# Patient Record
Sex: FEMALE | NOT HISPANIC OR LATINO | Employment: OTHER | URBAN - NONMETROPOLITAN AREA
[De-identification: names, ages, dates, MRNs, and addresses within clinical notes are randomized per-mention and may not be internally consistent; named-entity substitution may affect disease eponyms.]

---

## 2021-01-01 ENCOUNTER — TRANSCRIBE ORDERS (OUTPATIENT)
Dept: PODIATRY | Facility: CLINIC | Age: 81
End: 2021-01-01

## 2021-01-01 DIAGNOSIS — M79.672 BILATERAL FOOT PAIN: Primary | ICD-10-CM

## 2021-01-01 DIAGNOSIS — M79.671 BILATERAL FOOT PAIN: Primary | ICD-10-CM

## 2021-02-08 DIAGNOSIS — I15.9 SECONDARY HYPERTENSION: ICD-10-CM

## 2021-02-08 RX ORDER — AMMONIUM LACTATE 12 G/100G
LOTION TOPICAL PRN
COMMUNITY

## 2021-02-08 RX ORDER — ALLOPURINOL 100 MG/1
100 TABLET ORAL 2 TIMES DAILY
COMMUNITY

## 2021-02-08 RX ORDER — ASPIRIN 81 MG/1
81 TABLET ORAL DAILY
Status: ON HOLD | COMMUNITY
End: 2022-03-25 | Stop reason: HOSPADM

## 2021-02-08 RX ORDER — ATORVASTATIN CALCIUM 20 MG/1
20 TABLET, FILM COATED ORAL DAILY
COMMUNITY

## 2021-02-08 RX ORDER — LOSARTAN POTASSIUM 25 MG/1
25 TABLET ORAL DAILY
Status: ON HOLD | COMMUNITY
End: 2022-01-02 | Stop reason: HOSPADM

## 2021-02-08 RX ORDER — AMLODIPINE BESYLATE 5 MG/1
5 TABLET ORAL DAILY
Status: ON HOLD | COMMUNITY
End: 2022-03-25 | Stop reason: HOSPADM

## 2021-02-08 RX ORDER — LEVOTHYROXINE SODIUM 0.1 MG/1
100 TABLET ORAL DAILY
Status: ON HOLD | COMMUNITY
End: 2022-03-25 | Stop reason: SDUPTHER

## 2021-02-08 RX ORDER — FUROSEMIDE 80 MG
80 TABLET ORAL DAILY
Status: ON HOLD | COMMUNITY
End: 2021-11-23 | Stop reason: HOSPADM

## 2021-02-08 RX ORDER — NITROGLYCERIN 2 %
0.5 OINTMENT (GRAM) TRANSDERMAL PRN
COMMUNITY

## 2021-02-08 RX ORDER — MAGNESIUM OXIDE 400 MG/1
400 TABLET ORAL 2 TIMES DAILY
COMMUNITY

## 2021-02-08 RX ORDER — OXYBUTYNIN CHLORIDE 5 MG/1
5 TABLET, EXTENDED RELEASE ORAL DAILY
Status: ON HOLD | COMMUNITY
End: 2021-11-17

## 2021-03-03 ENCOUNTER — TELEPHONE (OUTPATIENT)
Dept: CARDIOLOGY CLINIC | Age: 81
End: 2021-03-03

## 2021-03-03 NOTE — TELEPHONE ENCOUNTER
Spoke with Joaquín Owens, the Daughter, to see if the Pt had any other history. Pt stated that saw a cardiologist in [place], and she would find the name of the doctor or the clinic she was seen in and call the office back.

## 2021-03-08 ENCOUNTER — OFFICE VISIT (OUTPATIENT)
Dept: CARDIOLOGY CLINIC | Age: 81
End: 2021-03-08
Payer: MEDICARE

## 2021-03-08 VITALS
BODY MASS INDEX: 27.23 KG/M2 | HEIGHT: 62 IN | OXYGEN SATURATION: 99 % | DIASTOLIC BLOOD PRESSURE: 78 MMHG | SYSTOLIC BLOOD PRESSURE: 124 MMHG | HEART RATE: 59 BPM | WEIGHT: 148 LBS

## 2021-03-08 DIAGNOSIS — I25.10 CORONARY ARTERY DISEASE INVOLVING NATIVE CORONARY ARTERY OF NATIVE HEART WITHOUT ANGINA PECTORIS: Primary | ICD-10-CM

## 2021-03-08 DIAGNOSIS — E78.5 HYPERLIPIDEMIA, UNSPECIFIED HYPERLIPIDEMIA TYPE: ICD-10-CM

## 2021-03-08 DIAGNOSIS — I44.7 LEFT BUNDLE BRANCH BLOCK: ICD-10-CM

## 2021-03-08 DIAGNOSIS — I10 BENIGN HYPERTENSION: ICD-10-CM

## 2021-03-08 PROCEDURE — 93000 ELECTROCARDIOGRAM COMPLETE: CPT | Performed by: INTERNAL MEDICINE

## 2021-03-08 PROCEDURE — 1123F ACP DISCUSS/DSCN MKR DOCD: CPT | Performed by: INTERNAL MEDICINE

## 2021-03-08 PROCEDURE — 1036F TOBACCO NON-USER: CPT | Performed by: INTERNAL MEDICINE

## 2021-03-08 PROCEDURE — 1090F PRES/ABSN URINE INCON ASSESS: CPT | Performed by: INTERNAL MEDICINE

## 2021-03-08 PROCEDURE — 99204 OFFICE O/P NEW MOD 45 MIN: CPT | Performed by: INTERNAL MEDICINE

## 2021-03-08 PROCEDURE — G8417 CALC BMI ABV UP PARAM F/U: HCPCS | Performed by: INTERNAL MEDICINE

## 2021-03-08 PROCEDURE — 4040F PNEUMOC VAC/ADMIN/RCVD: CPT | Performed by: INTERNAL MEDICINE

## 2021-03-08 PROCEDURE — G8484 FLU IMMUNIZE NO ADMIN: HCPCS | Performed by: INTERNAL MEDICINE

## 2021-03-08 PROCEDURE — G8400 PT W/DXA NO RESULTS DOC: HCPCS | Performed by: INTERNAL MEDICINE

## 2021-03-08 PROCEDURE — G8427 DOCREV CUR MEDS BY ELIG CLIN: HCPCS | Performed by: INTERNAL MEDICINE

## 2021-03-08 RX ORDER — TOLTERODINE TARTRATE 2 MG/1
2 TABLET, EXTENDED RELEASE ORAL NIGHTLY
COMMUNITY

## 2021-03-08 ASSESSMENT — ENCOUNTER SYMPTOMS
ABDOMINAL PAIN: 0
BLOOD IN STOOL: 0
NAUSEA: 0
COUGH: 1
VOMITING: 0
SHORTNESS OF BREATH: 1

## 2021-03-08 NOTE — PROGRESS NOTES
Office Visit  Maia Mcginnis is a 79 y.o. female; who present today for New Patient (No Cardiac Sx)      HPI  I am seeing this 79-year-old white female in office evaluation today for the first time to establish with a new cardiologist since she relocated from [place]. I have some of her records from [place] and her history includes coronary artery disease with prior bypass graft surgery along with chronic left bundle branch block. She is living with her daughter who was present and provided most of the information. The patient is able to answer questions appropriately but she seems somewhat slow in answering. She uses a walker to ambulate therefore limited but has not been experiencing any chest discomfort. She gets some exertional dyspnea that has not changed. She gets occasional palpitations that are short-lived, no presyncope or syncope. Her daughter reports that she had an echocardiogram at another facility recently and they were not given the official report, however, she reports that the echo technologist told them that she was in atrial fibrillation but this patient's records from [place] do not indicate any history of atrial fibrillation. She is in sinus rhythm today. We have requested the echocardiogram report. Current Outpatient Medications   Medication Sig Dispense Refill    tolterodine (DETROL) 2 MG tablet Take 2 mg by mouth nightly      allopurinol (ZYLOPRIM) 100 MG tablet Take 100 mg by mouth 2 times daily      amLODIPine (NORVASC) 10 MG tablet Take 5 mg by mouth daily       ammonium lactate (LAC-HYDRIN) 12 % lotion Apply topically as needed for Dry Skin Apply topically as needed.       aspirin 81 MG EC tablet Take 81 mg by mouth daily      atorvastatin (LIPITOR) 20 MG tablet Take 20 mg by mouth daily      Cyanocobalamin 1000 MCG LOZG Take 1,000 mcg by mouth daily      furosemide (LASIX) 80 MG tablet Take 80 mg by mouth daily      levothyroxine (SYNTHROID) 100 MCG tablet Take 100 mcg by mouth Daily      losartan (COZAAR) 25 MG tablet Take 25 mg by mouth daily      magnesium oxide (MAG-OX) 400 MG tablet Take 400 mg by mouth 2 times daily      nitroglycerin (NITRO-BID) 2 % ointment Place 0.5 inches onto the skin as needed       vitamin D 25 MCG (1000 UT) CAPS Take 1,000 Units by mouth daily       oxybutynin (DITROPAN-XL) 5 MG extended release tablet Take 5 mg by mouth daily       No current facility-administered medications for this visit. There are no discontinued medications. No Known Allergies    Past Medical History:   Diagnosis Date    Arthritis     Bladder infection     CAD (coronary artery disease)     Cardiovascular disease     Chronic kidney disease     Colon polyps     Constipation     Gallbladder disease     GERD (gastroesophageal reflux disease)     Gout     Heart attack ([facility] [address]     Heart disease     Hyperlipidemia     Hypertension     Incontinence     Thyroid disease      Negative - Past Medical History for  No past medical history pertinent negatives. Past Surgical History:   Procedure Laterality Date    CHOLECYSTECTOMY      CORONARY ARTERY BYPASS GRAFT      heart bypass    HYSTERECTOMY       Social History     Occupational History    Not on file   Tobacco Use    Smoking status: Never Smoker    Smokeless tobacco: Never Used   Substance and Sexual Activity    Alcohol use: Not Currently    Drug use: Not Currently    Sexual activity: Not Currently        Family History   Problem Relation Age of Onset    Heart Attack Father     Cancer Mother        Review of Systems  Review of Systems   Constitutional: Negative for fever. Respiratory: Positive for cough and shortness of breath. Gastrointestinal: Negative for abdominal pain, blood in stool, nausea and vomiting. Genitourinary: Negative for dysuria and hematuria. Musculoskeletal: Positive for arthralgias. Skin: Negative for rash.    Neurological: Negative for facial asymmetry, weakness and headaches. All other systems reviewed and are negative. Physical Exam  /78   Pulse 59   Ht 5' 2\" (1.575 m)   Wt 148 lb (67.1 kg)   SpO2 99%   BMI 27.07 kg/m²    Physical Exam  Constitutional:       Appearance: Normal appearance. She is normal weight. HENT:      Head: Normocephalic. Mouth/Throat:      Mouth: Mucous membranes are moist.      Tongue: Tongue does not deviate from midline. Eyes:      Conjunctiva/sclera: Conjunctivae normal.      Pupils: Pupils are equal, round, and reactive to light. Cardiovascular:      Rate and Rhythm: Normal rate and regular rhythm. Pulses: No decreased pulses. Carotid pulses are 2+ on the right side and 2+ on the left side. Radial pulses are 2+ on the right side and 2+ on the left side. Femoral pulses are 2+ on the right side and 2+ on the left side. Dorsalis pedis pulses are 2+ on the right side and 2+ on the left side. Posterior tibial pulses are 2+ on the right side and 2+ on the left side. Heart sounds: S1 normal. Murmur present. Systolic (Early, left sternal border) murmur present with a grade of 2/6. No friction rub. No gallop. Comments: Paradoxical split S2  Pulmonary:      Effort: Pulmonary effort is normal.      Breath sounds: Normal breath sounds. Abdominal:      General: Bowel sounds are normal. There is no abdominal bruit. Palpations: Abdomen is soft. There is no mass. Tenderness: There is no abdominal tenderness. Musculoskeletal:         General: No swelling. Right lower leg: No edema. Left lower leg: No edema. Skin:     General: Skin is warm and dry. Neurological:      Mental Status: She is alert and oriented to person, place, and time.    Psychiatric:         Behavior: Behavior normal.         Assessment/Plan    EKG Findings:  Sinus bradycardia, 57 bpm, left bundle branch block    Problem List Items Addressed This Visit        Cardiology Problems    Coronary artery disease involving native coronary artery of native heart without angina pectoris - Primary    Left bundle branch block    Benign hypertension    Hyperlipidemia           Diagnosis Orders   1. Coronary artery disease involving native coronary artery of native heart without angina pectoris      CABG x4, 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 2. Left bundle branch block      Chronic   3. Benign hypertension     4. Hyperlipidemia, unspecified hyperlipidemia type         Recommendations:   Diet: Low-sodium, low-fat diet  Activity: Moderate activities, fall precautions, patient uses a walker  Medication Changes: Continue same medications    We have requested the echocardiogram report but at this point it does not sound very certain that there was atrial fibrillation during the echocardiogram.    No orders of the defined types were placed in this encounter. Orders Placed This Encounter   Procedures    EKG 12 lead     Order Specific Question:   Reason for Exam?     Answer: Other       Return in about 6 months (around 9/8/2021).

## 2021-09-27 ENCOUNTER — OFFICE VISIT (OUTPATIENT)
Dept: CARDIOLOGY CLINIC | Age: 81
End: 2021-09-27
Payer: MEDICARE

## 2021-09-27 VITALS
OXYGEN SATURATION: 96 % | HEART RATE: 66 BPM | SYSTOLIC BLOOD PRESSURE: 124 MMHG | BODY MASS INDEX: 28.3 KG/M2 | HEIGHT: 62 IN | WEIGHT: 153.8 LBS | DIASTOLIC BLOOD PRESSURE: 82 MMHG

## 2021-09-27 DIAGNOSIS — I10 ESSENTIAL HYPERTENSION: Primary | ICD-10-CM

## 2021-09-27 DIAGNOSIS — I25.10 CORONARY ARTERY DISEASE INVOLVING NATIVE CORONARY ARTERY OF NATIVE HEART WITHOUT ANGINA PECTORIS: ICD-10-CM

## 2021-09-27 DIAGNOSIS — E78.5 HYPERLIPIDEMIA, UNSPECIFIED HYPERLIPIDEMIA TYPE: ICD-10-CM

## 2021-09-27 PROCEDURE — 99214 OFFICE O/P EST MOD 30 MIN: CPT | Performed by: NURSE PRACTITIONER

## 2021-09-27 PROCEDURE — 1123F ACP DISCUSS/DSCN MKR DOCD: CPT | Performed by: NURSE PRACTITIONER

## 2021-09-27 PROCEDURE — G8417 CALC BMI ABV UP PARAM F/U: HCPCS | Performed by: NURSE PRACTITIONER

## 2021-09-27 PROCEDURE — G8427 DOCREV CUR MEDS BY ELIG CLIN: HCPCS | Performed by: NURSE PRACTITIONER

## 2021-09-27 PROCEDURE — 4040F PNEUMOC VAC/ADMIN/RCVD: CPT | Performed by: NURSE PRACTITIONER

## 2021-09-27 PROCEDURE — G8400 PT W/DXA NO RESULTS DOC: HCPCS | Performed by: NURSE PRACTITIONER

## 2021-09-27 PROCEDURE — 1090F PRES/ABSN URINE INCON ASSESS: CPT | Performed by: NURSE PRACTITIONER

## 2021-09-27 PROCEDURE — 1036F TOBACCO NON-USER: CPT | Performed by: NURSE PRACTITIONER

## 2021-09-27 ASSESSMENT — ENCOUNTER SYMPTOMS
SHORTNESS OF BREATH: 0
WHEEZING: 0
COUGH: 0
CHEST TIGHTNESS: 0
SORE THROAT: 0

## 2021-09-27 NOTE — PROGRESS NOTES
[facility] Cardiology   Established Patient Office Visit   [facility]. [address]  [phone]        OFFICE VISIT:  2021    Leandro Gary - : 1940    Reason For Visit:  Freya Rosenbaum is a 79 y.o. female who is here for Follow-up (Pt is here with no complaints)    1. Essential hypertension    2. Hyperlipidemia, unspecified hyperlipidemia type    3. Coronary artery disease involving native coronary artery of native heart without angina pectoris        Patient with a history of coronary artery disease/CABG, hypertension, and hyperlipidemia. She is a patient of Dr Becky Gray       Patient presents to clinic today for 6-month follow-up. Patient denies any complaints of chest pain, pressure or tightness. There is no shortness of breath, orthopnea or PND. Patient denies any lightheadedness, dizziness or syncope. Subjective    Leandro Gary is a 79 y.o. female with the following history as recorded in [facility]:    Patient Active Problem List    Diagnosis Date Noted    Coronary artery disease involving native coronary artery of native heart without angina pectoris 2021    Left bundle branch block 2021    Benign hypertension 2021    Hyperlipidemia 2021    Secondary hypertension 2021     Current Outpatient Medications   Medication Sig Dispense Refill    tolterodine (DETROL) 2 MG tablet Take 2 mg by mouth nightly      allopurinol (ZYLOPRIM) 100 MG tablet Take 100 mg by mouth 2 times daily      amLODIPine (NORVASC) 10 MG tablet Take 5 mg by mouth daily       ammonium lactate (LAC-HYDRIN) 12 % lotion Apply topically as needed for Dry Skin Apply topically as needed.       aspirin 81 MG EC tablet Take 81 mg by mouth daily      atorvastatin (LIPITOR) 20 MG tablet Take 20 mg by mouth daily      Cyanocobalamin 1000 MCG LOZG Take 1,000 mcg by mouth daily      furosemide (LASIX) 80 MG tablet Take 80 mg by mouth daily      levothyroxine (SYNTHROID) 100 5' 2\" (1.575 m)   Wt 153 lb 12.8 oz (69.8 kg)   SpO2 96%   BMI 28.13 kg/m²     GENERAL - well developed and well nourished, conversant  HEENT   PERRLA, Hearing appears normal, gentleman lids are normal.  External inspection of ears and nose appear normal.  NECK - no thyromegaly, no JVD, trachea is in the midline  CARDIOVASCULAR  PMI is in the mid line clavicular position, Normal S1 and S2 with no systolic murmur. No S3 or S4    PULMONARY  no respiratory distress. No wheezes or rales. Lungs are clear to ausculation, normal respiratory effort. ABDOMEN   soft, non tender, no rebound  MUSCULOSKELETAL   range of motion of the upper and lower extermites appears normal and equal and is without pain   EXTREMITIES - no significant edema   NEUROLOGIC  gait and station are normal  SKIN - turgor is normal, can warm and dry. PSYCHIATRIC - normal mood and affect, alert and orientated x 3,      ASSESSMENT:    ALL THE CARDIOLOGY PROBLEMS ARE LISTED ABOVE; HOWEVER, THE FOLLOWING SPECIFIC CARDIAC PROBLEMS / CONDITIONS WERE ADDRESSED AND TREATED DURING THE OFFICE VISIT TODAY:                                                                                            MEDICAL DECISION MAKING             Cardiac Specific Problem / Diagnosis  Discussion and Data Reviewed Diagnostic Procedures Ordered Management Options Selected           1. CAD  Stable   Review and summation of old records:    No chest pain. Patient is on aspirin, Lipitor. No Continue current medications:     Yes           2. Hypertension  Well Controlled   Pressure in the office today 124/82. O2 sat 96%. Patient is on amlodipine 5 mg daily. Losartan 25 mg daily. No Continue current medications:    Yes           3. Hyperlipidemia Stable  patient is on Lipitor 20 mg daily. No Continue current medications: Yes                     No orders of the defined types were placed in this encounter.     No orders of the defined types were placed in this encounter. Discussed with patient and daughter. Return in about 6 months (around 3/27/2022) for Dr Valentín Dubose. I greatly appreciate the opportunity to meet Marcia Andrade and your confidence in allowing me to participate in her cardiovascular care. GIAN Aldridge NP  9/27/2021 3:09 PM CDT                    This dictation was generated by voice recognition computer software. Although all attempts are made to edit dictation for accuracy, there may be errors in the transcription that are not intended.

## 2021-11-17 ENCOUNTER — APPOINTMENT (OUTPATIENT)
Dept: GENERAL RADIOLOGY | Age: 81
DRG: 072 | End: 2021-11-17
Payer: MEDICARE

## 2021-11-17 ENCOUNTER — HOSPITAL ENCOUNTER (INPATIENT)
Age: 81
LOS: 6 days | Discharge: NURSING, REHAB OR LONG-TERM CARE | DRG: 072 | End: 2021-11-23
Attending: EMERGENCY MEDICINE | Admitting: INTERNAL MEDICINE
Payer: MEDICARE

## 2021-11-17 DIAGNOSIS — R78.81 POSITIVE BLOOD CULTURE: Primary | ICD-10-CM

## 2021-11-17 DIAGNOSIS — N28.9 RENAL INSUFFICIENCY: ICD-10-CM

## 2021-11-17 DIAGNOSIS — F05 DELIRIUM SUPERIMPOSED ON DEMENTIA: ICD-10-CM

## 2021-11-17 PROBLEM — R41.82 ALTERED MENTAL STATUS: Status: ACTIVE | Noted: 2021-11-17

## 2021-11-17 LAB
ALBUMIN SERPL-MCNC: 4.7 G/DL (ref 3.5–5.2)
ALP BLD-CCNC: 102 U/L (ref 35–104)
ALT SERPL-CCNC: 14 U/L (ref 5–33)
AMMONIA: 12 UMOL/L (ref 11–51)
ANION GAP SERPL CALCULATED.3IONS-SCNC: 14 MMOL/L (ref 7–19)
AST SERPL-CCNC: 27 U/L (ref 5–32)
BASE EXCESS VENOUS: 0 MMOL/L
BASOPHILS ABSOLUTE: 0 K/UL (ref 0–0.2)
BASOPHILS RELATIVE PERCENT: 0.5 % (ref 0–1)
BILIRUB SERPL-MCNC: 0.5 MG/DL (ref 0.2–1.2)
BILIRUBIN URINE: NEGATIVE
BLOOD, URINE: NEGATIVE
BUN BLDV-MCNC: 29 MG/DL (ref 8–23)
CALCIUM SERPL-MCNC: 11 MG/DL (ref 8.8–10.2)
CARBOXYHEMOGLOBIN: 6.3 %
CHLORIDE BLD-SCNC: 101 MMOL/L (ref 98–111)
CLARITY: CLEAR
CO2: 21 MMOL/L (ref 22–29)
COLOR: YELLOW
CREAT SERPL-MCNC: 1.3 MG/DL (ref 0.5–0.9)
EOSINOPHILS ABSOLUTE: 0.1 K/UL (ref 0–0.6)
EOSINOPHILS RELATIVE PERCENT: 1 % (ref 0–5)
GFR AFRICAN AMERICAN: 47
GFR NON-AFRICAN AMERICAN: 39
GLUCOSE BLD-MCNC: 114 MG/DL (ref 74–109)
GLUCOSE URINE: NEGATIVE MG/DL
HCO3 VENOUS: 24 MMOL/L (ref 23–29)
HCT VFR BLD CALC: 46.7 % (ref 37–47)
HEMOGLOBIN: 14 G/DL (ref 12–16)
IMMATURE GRANULOCYTES #: 0 K/UL
KETONES, URINE: NEGATIVE MG/DL
LACTIC ACID: 1.8 MMOL/L (ref 0.5–1.9)
LEUKOCYTE ESTERASE, URINE: NEGATIVE
LYMPHOCYTES ABSOLUTE: 1.5 K/UL (ref 1.1–4.5)
LYMPHOCYTES RELATIVE PERCENT: 18 % (ref 20–40)
MCH RBC QN AUTO: 26.9 PG (ref 27–31)
MCHC RBC AUTO-ENTMCNC: 30 G/DL (ref 33–37)
MCV RBC AUTO: 89.8 FL (ref 81–99)
MONOCYTES ABSOLUTE: 0.4 K/UL (ref 0–0.9)
MONOCYTES RELATIVE PERCENT: 5.4 % (ref 0–10)
NEUTROPHILS ABSOLUTE: 6.1 K/UL (ref 1.5–7.5)
NEUTROPHILS RELATIVE PERCENT: 74.9 % (ref 50–65)
NITRITE, URINE: NEGATIVE
O2 CONTENT, VEN: 15 ML/DL
O2 SAT, VEN: 75 %
PCO2, VEN: 38 MMHG (ref 40–50)
PDW BLD-RTO: 15.4 % (ref 11.5–14.5)
PH UA: 7 (ref 5–8)
PH VENOUS: 7.41 (ref 7.35–7.45)
PLATELET # BLD: 207 K/UL (ref 130–400)
PMV BLD AUTO: 10.7 FL (ref 9.4–12.3)
PO2, VEN: 33 MMHG
POTASSIUM REFLEX MAGNESIUM: 5.2 MMOL/L (ref 3.5–5)
PROTEIN UA: NEGATIVE MG/DL
RBC # BLD: 5.2 M/UL (ref 4.2–5.4)
SARS-COV-2, NAAT: NOT DETECTED
SODIUM BLD-SCNC: 136 MMOL/L (ref 136–145)
SPECIFIC GRAVITY UA: 1.01 (ref 1–1.03)
TOTAL PROTEIN: 7.7 G/DL (ref 6.6–8.7)
TSH REFLEX FT4: 2.63 UIU/ML (ref 0.35–5.5)
TSH REFLEX FT4: 3.19 UIU/ML (ref 0.35–5.5)
UROBILINOGEN, URINE: 0.2 E.U./DL
WBC # BLD: 8.1 K/UL (ref 4.8–10.8)

## 2021-11-17 PROCEDURE — 83605 ASSAY OF LACTIC ACID: CPT

## 2021-11-17 PROCEDURE — 85025 COMPLETE CBC W/AUTO DIFF WBC: CPT

## 2021-11-17 PROCEDURE — 82800 BLOOD PH: CPT

## 2021-11-17 PROCEDURE — 71046 X-RAY EXAM CHEST 2 VIEWS: CPT

## 2021-11-17 PROCEDURE — 2500000003 HC RX 250 WO HCPCS: Performed by: NURSE PRACTITIONER

## 2021-11-17 PROCEDURE — 6360000002 HC RX W HCPCS: Performed by: EMERGENCY MEDICINE

## 2021-11-17 PROCEDURE — 99282 EMERGENCY DEPT VISIT SF MDM: CPT

## 2021-11-17 PROCEDURE — 80053 COMPREHEN METABOLIC PANEL: CPT

## 2021-11-17 PROCEDURE — 36415 COLL VENOUS BLD VENIPUNCTURE: CPT

## 2021-11-17 PROCEDURE — 87086 URINE CULTURE/COLONY COUNT: CPT

## 2021-11-17 PROCEDURE — 93005 ELECTROCARDIOGRAM TRACING: CPT | Performed by: EMERGENCY MEDICINE

## 2021-11-17 PROCEDURE — 82803 BLOOD GASES ANY COMBINATION: CPT

## 2021-11-17 PROCEDURE — 84443 ASSAY THYROID STIM HORMONE: CPT

## 2021-11-17 PROCEDURE — 1210000000 HC MED SURG R&B

## 2021-11-17 PROCEDURE — 87040 BLOOD CULTURE FOR BACTERIA: CPT

## 2021-11-17 PROCEDURE — 81003 URINALYSIS AUTO W/O SCOPE: CPT

## 2021-11-17 PROCEDURE — 82140 ASSAY OF AMMONIA: CPT

## 2021-11-17 PROCEDURE — 87635 SARS-COV-2 COVID-19 AMP PRB: CPT

## 2021-11-17 PROCEDURE — 2580000003 HC RX 258: Performed by: EMERGENCY MEDICINE

## 2021-11-17 PROCEDURE — 6360000002 HC RX W HCPCS: Performed by: NURSE PRACTITIONER

## 2021-11-17 PROCEDURE — 2580000003 HC RX 258: Performed by: NURSE PRACTITIONER

## 2021-11-17 RX ORDER — SODIUM CHLORIDE 0.9 % (FLUSH) 0.9 %
5-40 SYRINGE (ML) INJECTION PRN
Status: DISCONTINUED | OUTPATIENT
Start: 2021-11-17 | End: 2021-11-24 | Stop reason: HOSPADM

## 2021-11-17 RX ORDER — SODIUM CHLORIDE 9 MG/ML
25 INJECTION, SOLUTION INTRAVENOUS PRN
Status: DISCONTINUED | OUTPATIENT
Start: 2021-11-17 | End: 2021-11-18

## 2021-11-17 RX ORDER — ATORVASTATIN CALCIUM 20 MG/1
20 TABLET, FILM COATED ORAL DAILY
Status: DISCONTINUED | OUTPATIENT
Start: 2021-11-17 | End: 2021-11-24 | Stop reason: HOSPADM

## 2021-11-17 RX ORDER — ONDANSETRON 4 MG/1
4 TABLET, ORALLY DISINTEGRATING ORAL EVERY 8 HOURS PRN
Status: DISCONTINUED | OUTPATIENT
Start: 2021-11-17 | End: 2021-11-24 | Stop reason: HOSPADM

## 2021-11-17 RX ORDER — POLYETHYLENE GLYCOL 3350 17 G/17G
17 POWDER, FOR SOLUTION ORAL DAILY PRN
Status: DISCONTINUED | OUTPATIENT
Start: 2021-11-17 | End: 2021-11-24 | Stop reason: HOSPADM

## 2021-11-17 RX ORDER — DULOXETIN HYDROCHLORIDE 30 MG/1
30 CAPSULE, DELAYED RELEASE ORAL DAILY
COMMUNITY
End: 2021-12-31

## 2021-11-17 RX ORDER — 0.9 % SODIUM CHLORIDE 0.9 %
1000 INTRAVENOUS SOLUTION INTRAVENOUS ONCE
Status: COMPLETED | OUTPATIENT
Start: 2021-11-17 | End: 2021-11-17

## 2021-11-17 RX ORDER — ONDANSETRON 2 MG/ML
4 INJECTION INTRAMUSCULAR; INTRAVENOUS EVERY 6 HOURS PRN
Status: DISCONTINUED | OUTPATIENT
Start: 2021-11-17 | End: 2021-11-24 | Stop reason: HOSPADM

## 2021-11-17 RX ORDER — SODIUM CHLORIDE 0.9 % (FLUSH) 0.9 %
5-40 SYRINGE (ML) INJECTION EVERY 12 HOURS SCHEDULED
Status: DISCONTINUED | OUTPATIENT
Start: 2021-11-17 | End: 2021-11-24 | Stop reason: HOSPADM

## 2021-11-17 RX ORDER — LOSARTAN POTASSIUM 50 MG/1
25 TABLET ORAL DAILY
Status: DISCONTINUED | OUTPATIENT
Start: 2021-11-17 | End: 2021-11-24 | Stop reason: HOSPADM

## 2021-11-17 RX ORDER — ASPIRIN 81 MG/1
81 TABLET ORAL DAILY
Status: DISCONTINUED | OUTPATIENT
Start: 2021-11-17 | End: 2021-11-24 | Stop reason: HOSPADM

## 2021-11-17 RX ORDER — ACETAMINOPHEN 650 MG/1
650 SUPPOSITORY RECTAL EVERY 6 HOURS PRN
Status: DISCONTINUED | OUTPATIENT
Start: 2021-11-17 | End: 2021-11-24 | Stop reason: HOSPADM

## 2021-11-17 RX ORDER — AMLODIPINE BESYLATE 5 MG/1
5 TABLET ORAL DAILY
Status: DISCONTINUED | OUTPATIENT
Start: 2021-11-17 | End: 2021-11-24 | Stop reason: HOSPADM

## 2021-11-17 RX ORDER — LEVOTHYROXINE SODIUM 0.05 MG/1
100 TABLET ORAL DAILY
Status: DISCONTINUED | OUTPATIENT
Start: 2021-11-17 | End: 2021-11-24 | Stop reason: HOSPADM

## 2021-11-17 RX ORDER — ACETAMINOPHEN 325 MG/1
650 TABLET ORAL EVERY 6 HOURS PRN
Status: DISCONTINUED | OUTPATIENT
Start: 2021-11-17 | End: 2021-11-24 | Stop reason: HOSPADM

## 2021-11-17 RX ADMIN — SODIUM CHLORIDE, PRESERVATIVE FREE 10 ML: 5 INJECTION INTRAVENOUS at 21:42

## 2021-11-17 RX ADMIN — PIPERACILLIN AND TAZOBACTAM 3375 MG: 3; .375 INJECTION, POWDER, LYOPHILIZED, FOR SOLUTION INTRAVENOUS at 14:41

## 2021-11-17 RX ADMIN — ENOXAPARIN SODIUM 30 MG: 100 INJECTION SUBCUTANEOUS at 16:45

## 2021-11-17 RX ADMIN — FAMOTIDINE 20 MG: 10 INJECTION, SOLUTION INTRAVENOUS at 16:45

## 2021-11-17 RX ADMIN — SODIUM CHLORIDE 1000 ML: 9 INJECTION, SOLUTION INTRAVENOUS at 14:42

## 2021-11-17 ASSESSMENT — ENCOUNTER SYMPTOMS
ALLERGIC/IMMUNOLOGIC NEGATIVE: 1
RESPIRATORY NEGATIVE: 1
EYES NEGATIVE: 1
GASTROINTESTINAL NEGATIVE: 1

## 2021-11-17 NOTE — ED PROVIDER NOTES
[facility] 3 SHAAN/VAS/MED  EMERGENCY DEPARTMENT ENCOUNTER      Pt Name: Celestine Gastelum  MRN: [id number]  Tacosgflaura 1940  Date of evaluation: 11/17/2021  Provider: Aftab Ramon MD    CHIEF COMPLAINT       Chief Complaint   Patient presents with    Altered Mental Status     confusion and hallucinations per daughter, started yesterday. Seen At [address] yesterday without dx         HISTORY OF PRESENT ILLNESS   (Location/Symptom, Timing/Onset,Context/Setting, Quality, Duration, Modifying Factors, Severity)  Note limiting factors. Celestine Gastelum is a 79 y.o. female who presents to the emergency department with complaint of confusion since yesterday according to her daughter. Daughter states patient has had hallucinations and that she seems to be seeing things that are not there and has been very confused. States patient has had difficulty even drinking from a cup because she has had a tremor and does not seem to have the coordination to get the cup to her mouth to drink from. Patient has not had any change in ambulation or obvious change in balance. Patient denies any specific complaints. She has not had any fever, vomiting, diarrhea, or other specific symptoms. Daughter states they went to [facility] yesterday where work-up including blood work, urinalysis, chest x-ray and CT scans of at least the head were performed and mostly came back unremarkable except for a known thyroid goiter. Patient has not had any significant changes this morning. Daughter states that [facility] called her and told her that patient had a positive blood culture there but that only one blood culture had been obtained rather than two and they could not tell her anything more than that but advised her to be reevaluated. Daughter states patient has suspected dementia but that they have not been formally diagnosed yet.   States they have a follow-up scheduled with neurology upcoming for dementia evaluation. HPI    NursingNotes were reviewed. REVIEW OF SYSTEMS    (2-9 systems for level 4, 10 or more for level 5)     Review of Systems   Unable to perform ROS: Mental status change   Psychiatric/Behavioral: Positive for confusion and hallucinations. A complete review of systems was performed and is negative except as noted above in the HPI. PAST MEDICAL HISTORY     Past Medical History:   Diagnosis Date    Arthritis     Bladder infection     CAD (coronary artery disease)     Cardiovascular disease     Chronic kidney disease     Colon polyps     Constipation     Gallbladder disease     GERD (gastroesophageal reflux disease)     Gout     Heart attack ([facility] [address]     Heart disease     Hyperlipidemia     Hypertension     Incontinence     Thyroid disease          SURGICAL HISTORY       Past Surgical History:   Procedure Laterality Date    CHOLECYSTECTOMY      CORONARY ARTERY BYPASS GRAFT      heart bypass    HYSTERECTOMY           CURRENT MEDICATIONS       Current Discharge Medication List      CONTINUE these medications which have NOT CHANGED    Details   DULoxetine (CYMBALTA) 30 MG extended release capsule Take 30 mg by mouth daily      tolterodine (DETROL) 2 MG tablet Take 2 mg by mouth nightly      allopurinol (ZYLOPRIM) 100 MG tablet Take 100 mg by mouth 2 times daily      amLODIPine (NORVASC) 5 MG tablet Take 5 mg by mouth daily       ammonium lactate (LAC-HYDRIN) 12 % lotion Apply topically as needed for Dry Skin Apply topically as needed.       aspirin 81 MG EC tablet Take 81 mg by mouth daily      atorvastatin (LIPITOR) 20 MG tablet Take 20 mg by mouth daily      Cyanocobalamin 1000 MCG LOZG Take 1,000 mcg by mouth daily      furosemide (LASIX) 80 MG tablet Take 80 mg by mouth daily      levothyroxine (SYNTHROID) 100 MCG tablet Take 100 mcg by mouth Daily      losartan (COZAAR) 25 MG tablet Take 25 mg by mouth daily      magnesium oxide (MAG-OX) 400 MG tablet Take 400 mg by mouth 2 times daily       nitroglycerin (NITRO-BID) 2 % ointment Place 0.5 inches onto the skin as needed       vitamin D 25 MCG (1000 UT) CAPS Take 1,000 Units by mouth daily              ALLERGIES     Patient has no known allergies. FAMILY HISTORY       Family History   Problem Relation Age of Onset    Heart Attack Father     Cancer Mother           SOCIAL HISTORY       Social History     Socioeconomic History    Marital status:      Spouse name: None    Number of children: None    Years of education: None    Highest education level: None   Occupational History    None   Tobacco Use    Smoking status: Never Smoker    Smokeless tobacco: Never Used   Vaping Use    Vaping Use: Never used   Substance and Sexual Activity    Alcohol use: Not Currently    Drug use: Not Currently    Sexual activity: Not Currently   Other Topics Concern    None   Social History Narrative    None     Social Determinants of Health     Financial Resource Strain:     Difficulty of Paying Living Expenses: Not on file   Food Insecurity:     Worried About Running Out of Food in the Last Year: Not on file    Анна of Food in the Last Year: Not on file   Transportation Needs:     Lack of Transportation (Medical): Not on file    Lack of Transportation (Non-Medical):  Not on file   Physical Activity:     Days of Exercise per Week: Not on file    Minutes of Exercise per Session: Not on file   Stress:     Feeling of Stress : Not on file   Social Connections:     Frequency of Communication with Friends and Family: Not on file    Frequency of Social Gatherings with Friends and Family: Not on file    Attends Catholic Services: Not on file    Active Member of Clubs or Organizations: Not on file    Attends Club or Organization Meetings: Not on file    Marital Status: Not on file   Intimate Partner Violence:     Fear of Current or Ex-Partner: Not on file    Emotionally Abused: Not on file    Physically Abused: Not on file    Sexually Abused: Not on file   Housing Stability:     Unable to Pay for Housing in the Last Year: Not on file    Number of Places Lived in the Last Year: Not on file    Unstable Housing in the Last Year: Not on file       SCREENINGS    Ulices Coma Scale  Eye Opening: Spontaneous  Best Verbal Response: Confused  Best Motor Response: Obeys commands  Orla Coma Scale Score: 14        PHYSICAL EXAM    (up to 7 for level 4, 8 or more for level 5)     ED Triage Vitals [11/17/21 1118]   BP Temp Temp Source Pulse Resp SpO2 Height Weight   (!) 164/79 97.7 °F (36.5 °C) Oral 64 14 96 % 5' 2\" (1.575 m) 150 lb (68 kg)       Physical Exam  Vitals and nursing note reviewed. Constitutional:       General: She is not in acute distress. Appearance: She is well-developed. She is not toxic-appearing or diaphoretic. HENT:      Head: Normocephalic and atraumatic. Eyes:      General: No scleral icterus. Right eye: No discharge. Left eye: No discharge. Pupils: Pupils are equal, round, and reactive to light. Cardiovascular:      Rate and Rhythm: Normal rate and regular rhythm. Pulmonary:      Effort: Pulmonary effort is normal. No respiratory distress. Breath sounds: No stridor. Abdominal:      General: There is no distension. Musculoskeletal:         General: No deformity. Normal range of motion. Cervical back: Normal range of motion. Skin:     General: Skin is warm and dry. Neurological:      Mental Status: She is alert. GCS: GCS eye subscore is 4. GCS motor subscore is 6. Cranial Nerves: No cranial nerve deficit. Sensory: Sensation is intact. Motor: Motor function is intact. No abnormal muscle tone. Psychiatric:         Behavior: Behavior normal.         Thought Content:  Thought content normal.         Judgment: Judgment normal.         DIAGNOSTIC RESULTS     EKG: All EKG's are interpreted by the Emergency Department Physician who either signs or Co-signs this chart in the absence of a cardiologist.        RADIOLOGY:   Non-plain film images such as CT, Ultrasound and MRI are read by the radiologist. Plainradiographic images are visualized and preliminarily interpreted by the emergency physician with the below findings:        Interpretation per the Radiologist below, if available at the time of this note:    XR CHEST (2 VW)   Final Result   1. No consolidation/acute infiltrate identified. Prior coronary   bypass. Signed by Dr Napoleon Patterson            ED BEDSIDE ULTRASOUND:   Performed by ED Physician - none    LABS:  Labs Reviewed   CBC WITH AUTO DIFFERENTIAL - Abnormal; Notable for the following components:       Result Value    MCH 26.9 (*)     MCHC 30.0 (*)     RDW 15.4 (*)     Neutrophils % 74.9 (*)     Lymphocytes % 18.0 (*)     All other components within normal limits   COMPREHENSIVE METABOLIC PANEL W/ REFLEX TO MG FOR LOW K - Abnormal; Notable for the following components:    Potassium reflex Magnesium 5.2 (*)     CO2 21 (*)     Glucose 114 (*)     BUN 29 (*)     CREATININE 1.3 (*)     GFR Non- 39 (*)     GFR  47 (*)     Calcium 11.0 (*)     All other components within normal limits   VENOUS BLD GAS - Abnormal; Notable for the following components:    pCO2, Go 38.0 (*)     All other components within normal limits   COVID-19, RAPID   CULTURE, BLOOD 1   CULTURE, BLOOD 2   CULTURE, URINE   CULTURE, BLOOD 1   TSH WITH REFLEX TO FT4   URINE RT REFLEX TO CULTURE   LACTIC ACID, PLASMA   AMMONIA   TSH WITH REFLEX TO FT4       All other labs were within normal range or not returned as of this dictation.     EMERGENCY DEPARTMENT COURSE and DIFFERENTIALDIAGNOSIS/MDM:   Vitals:    Vitals:    11/17/21 1118   BP: (!) 164/79   Pulse: 64   Resp: 14   Temp: 97.7 °F (36.5 °C)   TempSrc: Oral   SpO2: 96%   Weight: 150 lb (68 kg)   Height: 5' 2\" (1.575 m)       MDM  ED Course as of 11/17/21 1742   Wed Nov 17, 2021   1336 Records from [facility] obtained. Laboratory evaluation shows unremarkable CBC and CMP. Cardiac work-up showed normal troponin, myoglobin, and CK-MB. Chest x-ray showed possible pneumonia and possible mass in the right hilum. A chest CT was subsequently performed which shows mass in the right and left side of the thyroid with a normal appearance of the lungs on both sides [JENNIFER]   0363 Patient reevaluated this time and still no change in clinical condition. Still very confused beyond baseline with no focal or lateralizing neurologic deficits. Laboratory evaluation here thus far shows no signs of an underlying infection or metabolic derangement as a cause of patient's change in mental status. [JENNIFER]   0692 Chemistry today shows similar findings to labs obtained last night. Creatinine on last night's labs was 1.4 and is 1.3 today. GFR was 38 and is 39 today. There is mild decrease in CO2 at 21 today compared to 26 yesterday. [JENNIFER]   7663 Unclear whether these lab findings represent an acute or chronic renal insufficiency as we have no prior labs available for comparison other than those from yesterday. [JENNIFER]      ED Course User Index  [JENNIFER] Helio Andrews MD     Based on the evaluation and work-up here patient is felt to require further monitoring, work-up, or treatment that is available in the emergency department. Case was discussed with hospitalist who agrees for observation or admission for further management. Treatment and stabilization as necessary were provided in the emergency department prior to transfer of care to the medicine service. CONSULTS:  IP CONSULT TO CASE MANAGEMENT    PROCEDURES:  Unless otherwise notedbelow, none     Procedures    FINAL IMPRESSION     1. Positive blood culture    2. Delirium superimposed on dementia    3.  Renal insufficiency          DISPOSITION/PLAN   DISPOSITION Admitted 11/17/2021 02:21:43 PM      PATIENT REFERRED TO:  No follow-up provider specified.     DISCHARGE MEDICATIONS:  Current Discharge Medication List             (Please note that portions of this note were completed with a voice recognition program.  Efforts were made to edit the dictations butoccasionally words are mis-transcribed.)    Yohana Briceño MD (electronically signed)  Emergency Physician          Yohana Layne MD  11/17/21 1743       Yohana Layne MD  12/08/21 6478

## 2021-11-17 NOTE — PROGRESS NOTES
4 Eyes Skin Assessment    Nanci Barbosa is being assessed upon: Admission    I agree that I, Samira Elizondo RN, along with Lakeisha Hay (either 2 RN's or 1 LPN and 1 RN) have performed a thorough Head to Toe Skin Assessment on the patient. ALL assessment sites listed below have been assessed. Areas assessed by both nurses:     [x]   Head, Face, and Ears   [x]   Shoulders, Back, and Chest  [x]   Arms, Elbows, and Hands   [x]   Coccyx, Sacrum, and Ischium  [x]   Legs, Feet, and Heels    Does the Patient have Skin Breakdown?  No    Zev Prevention initiated: Yes  Wound Care Orders initiated: No    WOC nurse consulted for Pressure Injury (Stage 3,4, Unstageable, DTI, NWPT, and Complex wounds) and New or Established Ostomies: NA        Primary Nurse eSignature: Samira Elizondo RN on 11/17/2021 at 4:32 PM      Co-Signer eSignature: Electronically signed by Lakeisha Hay RN on 11/17/21 at 4:35 PM CST

## 2021-11-17 NOTE — PROGRESS NOTES
Godwin Cleveland arrived to room # 329. Presented with: AMS, positive blood cultures  Mental Status: Patient is disoriented and alert. Vitals:    11/17/21 1118   BP: (!) 164/79   Pulse: 64   Resp: 14   Temp: 97.7 °F (36.5 °C)   SpO2: 96%     Patient safety contract and falls prevention contract reviewed with patient Yes. Oriented Patient and Family to room. Call light within reach. Yes.   Needs, issues or concerns expressed at this time: no.      Electronically signed by Miguelina Sherman RN on 11/17/2021 at 4:31 PM

## 2021-11-17 NOTE — PLAN OF CARE
behavior  Description: Absence of abusive behavior  Outcome: Ongoing  Goal: Verbalizations of feeling emotionally comfortable while being cared for will increase  Description: Verbalizations of feeling emotionally comfortable while being cared for will increase  Outcome: Ongoing     Problem: Psychomotor Activity - Altered:  Goal: Absence of psychomotor disturbance signs and symptoms  Description: Absence of psychomotor disturbance signs and symptoms  Outcome: Ongoing     Problem: Sensory Perception - Impaired:  Goal: Demonstrations of improved sensory functioning will increase  Description: Demonstrations of improved sensory functioning will increase  Outcome: Ongoing  Goal: Decrease in sensory misperception frequency  Description: Decrease in sensory misperception frequency  Outcome: Ongoing  Goal: Able to refrain from responding to false sensory perceptions  Description: Able to refrain from responding to false sensory perceptions  Outcome: Ongoing  Goal: Demonstrates accurate environmental perceptions  Description: Demonstrates accurate environmental perceptions  Outcome: Ongoing  Goal: Able to distinguish between reality-based and nonreality-based thinking  Description: Able to distinguish between reality-based and nonreality-based thinking  Outcome: Ongoing  Goal: Able to interrupt nonreality-based thinking  Description: Able to interrupt nonreality-based thinking  Outcome: Ongoing     Problem: Sleep Pattern Disturbance:  Goal: Appears well-rested  Description: Appears well-rested  Outcome: Ongoing     Problem: Infection:  Goal: Will remain free from infection  Description: Will remain free from infection  Outcome: Ongoing     Problem: Safety:  Goal: Free from accidental physical injury  Description: Free from accidental physical injury  Outcome: Ongoing  Goal: Free from intentional harm  Description: Free from intentional harm  Outcome: Ongoing     Problem: Daily Care:  Goal: Daily care needs are met  Description: Daily care needs are met  Outcome: Ongoing     Problem: Pain:  Goal: Patient's pain/discomfort is manageable  Description: Patient's pain/discomfort is manageable  Outcome: Ongoing     Problem: Skin Integrity:  Goal: Skin integrity will stabilize  Description: Skin integrity will stabilize  Outcome: Ongoing     Problem: Discharge Planning:  Goal: Patients continuum of care needs are met  Description: Patients continuum of care needs are met  Outcome: Ongoing     Problem: Health Behavior:  Goal: Compliance with treatment plan for underlying cause of condition will improve  Description: Compliance with treatment plan for underlying cause of condition will improve  Outcome: Ongoing  Goal: Identification of resources available to assist in meeting health care needs will improve  Description: Identification of resources available to assist in meeting health care needs will improve  Outcome: Ongoing     Problem: Fluid Volume:  Goal: Maintenance of adequate hydration will improve  Description: Maintenance of adequate hydration will improve  Outcome: Ongoing     Problem: Urinary Elimination:  Goal: Will remain free from infection  Description: Will remain free from infection  Outcome: Ongoing  Goal: Skin integrity will improve  Description: Skin integrity will improve  Outcome: Ongoing  Goal: Ability to recognize the need to void and respond appropriately will improve  Description: Ability to recognize the need to void and respond appropriately will improve  Outcome: Ongoing  Goal: Incidence of incontinence will decrease  Description: Incidence of incontinence will decrease  Outcome: Ongoing     Problem: Nutritional:  Goal: Nutritional status will improve  Description: Nutritional status will improve  Outcome: Ongoing     Problem: Physical Regulation:  Goal: Will remain free from infection  Description: Will remain free from infection  Outcome: Ongoing  Goal: Diagnostic test results will improve  Description: Diagnostic test results will improve  Outcome: Ongoing  Goal: Ability to maintain vital signs within normal range will improve  Description: Ability to maintain vital signs within normal range will improve  Outcome: Ongoing     Problem: Respiratory:  Goal: Ability to maintain normal respiratory secretions will improve  Description: Ability to maintain normal respiratory secretions will improve  Outcome: Ongoing

## 2021-11-17 NOTE — H&P
[facility] - History & Physical      PCP: Jacquelin Alvarado    Date of Admission: 11/17/2021    Date of Service: 11/17/2021    Chief Complaint:  Altered mental status  History Of Present Illness: The patient is a 79 y.o. female with past medical history of CAD, CKD, GERD, gout, arthritis, hypertension, and thyroid disease who presented to [facility] ED complaining of altered mental status. Daughter brings her in stating for the past several weeks her activity level has dramatically declined,  confusion has increased. She had been evaluated at [facility] twice for possible UTI that has been negative. Daughter states she sleeps most of the time. She was recently started on Cymbalta a few weeks ago for depression and to possibly help with chronic arthritic arthritis pain. She had some of her prior symptoms prior to starting this but since she started it is escalated. Family brought her to the ER for evaluation. Patient is somnolent will open her eyes to commands answers simple questions appropriately. Is not complaining of any pain nausea or shortness of breath. Past Medical History:        Diagnosis Date    Arthritis     Bladder infection     CAD (coronary artery disease)     Cardiovascular disease     Chronic kidney disease     Colon polyps     Constipation     Gallbladder disease     GERD (gastroesophageal reflux disease)     Gout     Heart attack ([address]     Heart disease     Hyperlipidemia     Hypertension     Incontinence     Thyroid disease        Past Surgical History:        Procedure Laterality Date    CHOLECYSTECTOMY      CORONARY ARTERY BYPASS GRAFT      heart bypass    HYSTERECTOMY         Home Medications:  Prior to Admission medications    Medication Sig Start Date End Date Taking?  Authorizing Provider   DULoxetine (CYMBALTA) 30 MG extended release capsule Take 30 mg by mouth daily   Yes Historical Provider, MD   tolterodine (DETROL) 2 MG tablet Take 2 mg by mouth nightly    Historical Provider, MD   allopurinol (ZYLOPRIM) 100 MG tablet Take 100 mg by mouth 2 times daily    Historical Provider, MD   amLODIPine (NORVASC) 10 MG tablet Take 5 mg by mouth daily     Historical Provider, MD   ammonium lactate (LAC-HYDRIN) 12 % lotion Apply topically as needed for Dry Skin Apply topically as needed. Historical Provider, MD   aspirin 81 MG EC tablet Take 81 mg by mouth daily    Historical Provider, MD   atorvastatin (LIPITOR) 20 MG tablet Take 20 mg by mouth daily    Historical Provider, MD   Cyanocobalamin 1000 MCG LOZG Take 1,000 mcg by mouth daily    Historical Provider, MD   furosemide (LASIX) 80 MG tablet Take 80 mg by mouth daily    Historical Provider, MD   levothyroxine (SYNTHROID) 100 MCG tablet Take 100 mcg by mouth Daily    Historical Provider, MD   losartan (COZAAR) 25 MG tablet Take 25 mg by mouth daily    Historical Provider, MD   magnesium oxide (MAG-OX) 400 MG tablet Take 400 mg by mouth daily     Historical Provider, MD   nitroglycerin (NITRO-BID) 2 % ointment Place 0.5 inches onto the skin as needed     Historical Provider, MD   oxybutynin (DITROPAN-XL) 5 MG extended release tablet Take 5 mg by mouth daily  Patient not taking: Reported on 9/27/2021    Historical Provider, MD   vitamin D 25 MCG (1000 UT) CAPS Take 1,000 Units by mouth daily     Historical Provider, MD       Allergies:    Patient has no known allergies. Social History:    The patient currently lives alone  Tobacco:   reports that she has never smoked. She has never used smokeless tobacco.  Alcohol:   reports previous alcohol use. Family History:      Problem Relation Age of Onset    Heart Attack Father     Cancer Mother            Review of Systems   Unable to perform ROS: Mental status change   Constitutional: Negative. HENT: Negative. Eyes: Negative. Respiratory: Negative. Cardiovascular: Negative. Gastrointestinal: Negative.     Endocrine: Negative. Musculoskeletal: Negative. Allergic/Immunologic: Negative. Neurological: Negative. Hematological: Negative. Psychiatric/Behavioral: Negative. All other systems reviewed and are negative. Physical Examination:  BP (!) 164/79   Pulse 64   Temp 97.7 °F (36.5 °C) (Oral)   Resp 14   Ht 5' 2\" (1.575 m)   Wt 150 lb (68 kg)   SpO2 96%   BMI 27.44 kg/m²     Physical Exam  Vitals reviewed. Constitutional:       General: She is not in acute distress. Appearance: She is ill-appearing. HENT:      Nose: Nose normal.      Mouth/Throat:      Mouth: Mucous membranes are moist.   Eyes:      Pupils: Pupils are equal, round, and reactive to light. Cardiovascular:      Rate and Rhythm: Normal rate and regular rhythm. Heart sounds: Murmur heard. Pulmonary:      Breath sounds: Normal breath sounds. Abdominal:      General: Bowel sounds are normal.   Musculoskeletal:      Cervical back: Neck supple. Right lower leg: Edema present. Left lower leg: Edema present. Skin:     General: Skin is warm and dry. Coloration: Skin is pale. Neurological:      Comments: sommnolent          Diagnostic Data:  CBC:  Recent Labs     11/17/21  1223   WBC 8.1   HGB 14.0   HCT 46.7        BMP:  Recent Labs     11/17/21  1223      K 5.2*      CO2 21*   BUN 29*   CREATININE 1.3*   CALCIUM 11.0*     Recent Labs     11/17/21  1223   AST 27   ALT 14   BILITOT 0.5   ALKPHOS 102     Coag Panel: No results for input(s): INR, PROTIME, APTT in the last 72 hours. Cardiac Enzymes: No results for input(s): Brielle Gazella in the last 72 hours. ABGs:No results found for: PHART, PO2ART, SZL9IKR  Urinalysis:No results found for: NITRU, WBCUA, BACTERIA, RBCUA, BLOODU, SPECGRAV, GLUCOSEU  A1C: No results for input(s): LABA1C in the last 72 hours.   ABG:No results for input(s): PHART, FOP3WAM, PO2ART, QLV4JWR, BEART, HGBAE, S7SNUVRK, CARBOXHGBART in the last 72 hours.      RAD:    XR CHEST (2 VW)    Result Date: 11/17/2021  XR CHEST (2 VW) 11/17/2021 11:28 AM HISTORY: Altered mental status, positive blood cultures COMPARISON: None. FINDINGS: No lung consolidation. No pleural effusion or pneumothorax. Prior coronary bypass. Heart size is normal. Pulmonary vasculature are nondilated. Slight right hilar prominence appears to be hilar vascular structures. No acute bony abnormality seen. 1. No consolidation/acute infiltrate identified. Prior coronary bypass. Signed by Dr Angel Sahni      Assessment/Plan:  Active Problems:    Altered mental status   Admit   Blood and urine culture   IVF   Emperic coverage with antibiotics   Hold cymbalta   Hold high dose lasix home med   Thyroid lab    CAD   Moniter vs per routine   ekg if complain of chest pain   Continue current b/p meds from home- norvasc and losarten          Resolved Problems:    * No resolved hospital problems. *      DVT Prophylaxis: Lovenox  Further Orders per Clinical course/attending. Signed:  Electronically signed by GIAN Oshea CNP on 11/17/21 at 2:36 PM CST       EMR Dragon/Transcription disclaimer:   Much of this encounter note is an electronic transcription/translation of spoken language to printed text.  The electronic translation of spoken language may permit erroneous, or at times, nonsensical words or phrases to be inadvertently transcribed; although attempts have made to review the note for such errors, some may still exist.

## 2021-11-18 PROBLEM — Z51.5 PALLIATIVE CARE PATIENT: Status: ACTIVE | Noted: 2021-11-18

## 2021-11-18 LAB
ALBUMIN SERPL-MCNC: 3.9 G/DL (ref 3.5–5.2)
ALP BLD-CCNC: 83 U/L (ref 35–104)
ALT SERPL-CCNC: 11 U/L (ref 5–33)
ANION GAP SERPL CALCULATED.3IONS-SCNC: 12 MMOL/L (ref 7–19)
AST SERPL-CCNC: 17 U/L (ref 5–32)
BASOPHILS ABSOLUTE: 0.1 K/UL (ref 0–0.2)
BASOPHILS RELATIVE PERCENT: 0.7 % (ref 0–1)
BILIRUB SERPL-MCNC: 0.5 MG/DL (ref 0.2–1.2)
BUN BLDV-MCNC: 28 MG/DL (ref 8–23)
CALCIUM SERPL-MCNC: 10.6 MG/DL (ref 8.8–10.2)
CHLORIDE BLD-SCNC: 103 MMOL/L (ref 98–111)
CO2: 23 MMOL/L (ref 22–29)
CREAT SERPL-MCNC: 1.5 MG/DL (ref 0.5–0.9)
EOSINOPHILS ABSOLUTE: 0.2 K/UL (ref 0–0.6)
EOSINOPHILS RELATIVE PERCENT: 2.5 % (ref 0–5)
GFR AFRICAN AMERICAN: 40
GFR NON-AFRICAN AMERICAN: 33
GLUCOSE BLD-MCNC: 102 MG/DL (ref 74–109)
HCT VFR BLD CALC: 41.3 % (ref 37–47)
HEMOGLOBIN: 13.2 G/DL (ref 12–16)
IMMATURE GRANULOCYTES #: 0 K/UL
IRON SATURATION: 12 % (ref 14–50)
IRON: 30 UG/DL (ref 37–145)
LYMPHOCYTES ABSOLUTE: 1.8 K/UL (ref 1.1–4.5)
LYMPHOCYTES RELATIVE PERCENT: 24.7 % (ref 20–40)
MCH RBC QN AUTO: 28 PG (ref 27–31)
MCHC RBC AUTO-ENTMCNC: 32 G/DL (ref 33–37)
MCV RBC AUTO: 87.5 FL (ref 81–99)
MONOCYTES ABSOLUTE: 0.7 K/UL (ref 0–0.9)
MONOCYTES RELATIVE PERCENT: 9.5 % (ref 0–10)
NEUTROPHILS ABSOLUTE: 4.4 K/UL (ref 1.5–7.5)
NEUTROPHILS RELATIVE PERCENT: 62.3 % (ref 50–65)
PDW BLD-RTO: 15.4 % (ref 11.5–14.5)
PLATELET # BLD: 207 K/UL (ref 130–400)
PMV BLD AUTO: 10.5 FL (ref 9.4–12.3)
POTASSIUM REFLEX MAGNESIUM: 4.2 MMOL/L (ref 3.5–5)
PRO-BNP: 4018 PG/ML (ref 0–1800)
RBC # BLD: 4.72 M/UL (ref 4.2–5.4)
SODIUM BLD-SCNC: 138 MMOL/L (ref 136–145)
TOTAL IRON BINDING CAPACITY: 248 UG/DL (ref 250–400)
TOTAL PROTEIN: 6.9 G/DL (ref 6.6–8.7)
TSH REFLEX FT4: 3.21 UIU/ML (ref 0.35–5.5)
VITAMIN B-12: >2000 PG/ML (ref 211–946)
WBC # BLD: 7.1 K/UL (ref 4.8–10.8)

## 2021-11-18 PROCEDURE — 83550 IRON BINDING TEST: CPT

## 2021-11-18 PROCEDURE — 82607 VITAMIN B-12: CPT

## 2021-11-18 PROCEDURE — 83880 ASSAY OF NATRIURETIC PEPTIDE: CPT

## 2021-11-18 PROCEDURE — 80053 COMPREHEN METABOLIC PANEL: CPT

## 2021-11-18 PROCEDURE — 87040 BLOOD CULTURE FOR BACTERIA: CPT

## 2021-11-18 PROCEDURE — 6370000000 HC RX 637 (ALT 250 FOR IP): Performed by: INTERNAL MEDICINE

## 2021-11-18 PROCEDURE — 6360000002 HC RX W HCPCS: Performed by: NURSE PRACTITIONER

## 2021-11-18 PROCEDURE — 6370000000 HC RX 637 (ALT 250 FOR IP): Performed by: NURSE PRACTITIONER

## 2021-11-18 PROCEDURE — 85025 COMPLETE CBC W/AUTO DIFF WBC: CPT

## 2021-11-18 PROCEDURE — 2500000003 HC RX 250 WO HCPCS: Performed by: NURSE PRACTITIONER

## 2021-11-18 PROCEDURE — 84443 ASSAY THYROID STIM HORMONE: CPT

## 2021-11-18 PROCEDURE — 83540 ASSAY OF IRON: CPT

## 2021-11-18 PROCEDURE — 51798 US URINE CAPACITY MEASURE: CPT

## 2021-11-18 PROCEDURE — 1210000000 HC MED SURG R&B

## 2021-11-18 PROCEDURE — 36415 COLL VENOUS BLD VENIPUNCTURE: CPT

## 2021-11-18 PROCEDURE — 82652 VIT D 1 25-DIHYDROXY: CPT

## 2021-11-18 PROCEDURE — 2580000003 HC RX 258: Performed by: NURSE PRACTITIONER

## 2021-11-18 RX ORDER — FERROUS SULFATE 325(65) MG
325 TABLET ORAL 2 TIMES DAILY WITH MEALS
Status: DISCONTINUED | OUTPATIENT
Start: 2021-11-18 | End: 2021-11-19

## 2021-11-18 RX ORDER — SODIUM CHLORIDE 9 MG/ML
25 INJECTION, SOLUTION INTRAVENOUS PRN
Status: DISCONTINUED | OUTPATIENT
Start: 2021-11-18 | End: 2021-11-24 | Stop reason: HOSPADM

## 2021-11-18 RX ORDER — 0.9 % SODIUM CHLORIDE 0.9 %
500 INTRAVENOUS SOLUTION INTRAVENOUS ONCE
Status: COMPLETED | OUTPATIENT
Start: 2021-11-18 | End: 2021-11-18

## 2021-11-18 RX ORDER — FUROSEMIDE 40 MG/1
40 TABLET ORAL DAILY
Status: COMPLETED | OUTPATIENT
Start: 2021-11-18 | End: 2021-11-20

## 2021-11-18 RX ORDER — DOCUSATE SODIUM 100 MG/1
100 CAPSULE, LIQUID FILLED ORAL DAILY
Status: DISCONTINUED | OUTPATIENT
Start: 2021-11-18 | End: 2021-11-24 | Stop reason: HOSPADM

## 2021-11-18 RX ADMIN — FERROUS SULFATE TAB 325 MG (65 MG ELEMENTAL FE) 325 MG: 325 (65 FE) TAB at 20:24

## 2021-11-18 RX ADMIN — PIPERACILLIN AND TAZOBACTAM 3375 MG: 3; .375 INJECTION, POWDER, LYOPHILIZED, FOR SOLUTION INTRAVENOUS at 16:34

## 2021-11-18 RX ADMIN — SODIUM CHLORIDE, PRESERVATIVE FREE 10 ML: 5 INJECTION INTRAVENOUS at 20:23

## 2021-11-18 RX ADMIN — ASPIRIN 81 MG: 81 TABLET, COATED ORAL at 08:53

## 2021-11-18 RX ADMIN — SODIUM CHLORIDE, PRESERVATIVE FREE 10 ML: 5 INJECTION INTRAVENOUS at 08:58

## 2021-11-18 RX ADMIN — ENOXAPARIN SODIUM 30 MG: 100 INJECTION SUBCUTANEOUS at 16:35

## 2021-11-18 RX ADMIN — LEVOTHYROXINE SODIUM 100 MCG: 50 TABLET ORAL at 20:25

## 2021-11-18 RX ADMIN — AMLODIPINE BESYLATE 5 MG: 5 TABLET ORAL at 08:54

## 2021-11-18 RX ADMIN — FUROSEMIDE 40 MG: 40 TABLET ORAL at 20:24

## 2021-11-18 RX ADMIN — PIPERACILLIN AND TAZOBACTAM 3375 MG: 3; .375 INJECTION, POWDER, LYOPHILIZED, FOR SOLUTION INTRAVENOUS at 09:03

## 2021-11-18 RX ADMIN — LOSARTAN POTASSIUM 25 MG: 50 TABLET, FILM COATED ORAL at 08:53

## 2021-11-18 RX ADMIN — FAMOTIDINE 20 MG: 10 INJECTION, SOLUTION INTRAVENOUS at 08:54

## 2021-11-18 RX ADMIN — ACETAMINOPHEN 650 MG: 325 TABLET ORAL at 14:30

## 2021-11-18 RX ADMIN — ATORVASTATIN CALCIUM 20 MG: 20 TABLET, FILM COATED ORAL at 08:53

## 2021-11-18 RX ADMIN — DOCUSATE SODIUM 100 MG: 100 CAPSULE, LIQUID FILLED ORAL at 20:24

## 2021-11-18 RX ADMIN — LEVOTHYROXINE SODIUM 100 MCG: 50 TABLET ORAL at 05:40

## 2021-11-18 RX ADMIN — SODIUM CHLORIDE 500 ML: 9 INJECTION, SOLUTION INTRAVENOUS at 10:59

## 2021-11-18 ASSESSMENT — PAIN SCALES - GENERAL: PAINLEVEL_OUTOF10: 9

## 2021-11-18 NOTE — CONSULTS
decline and will plan accordingly from there. Review of any needed services:  None at this time.              Electronically signed by Julieta Gutierrez RN on 11/18/2021 at 9:44 AM

## 2021-11-18 NOTE — PROGRESS NOTES
Comprehensive Nutrition Assessment    Type and Reason for Visit:  Initial, Positive Nutrition Screen    Nutrition Recommendations/Plan:   Ensure ONS qd    Nutrition Assessment:  +NS for wt loss and poor po intake PTA. Pt high risk for nutrition compromise AEB wt loss (10lbs X 2 months) and poor appetite X 3 weeks per daughter report. Daughter also states pt needs assistance with meals. Will send Ensure ONS qd to promote kcal/protein intake. Malnutrition Assessment:  Malnutrition Status: At risk for malnutrition (Comment)    Context:  Acute Illness     Findings of the 6 clinical characteristics of malnutrition:  Energy Intake:  1 - 75% or less of estimated energy requirements for 7 or more days  Weight Loss:  No significant weight loss     Body Fat Loss:  Unable to assess     Muscle Mass Loss:  Unable to assess    Fluid Accumulation:  1 - Mild Extremities   Strength:  Not Performed    Estimated Daily Nutrient Needs:  Energy (kcal):  4742-2302 kcals/day; Weight Used for Energy Requirements:  Current (20-25 kcals/kg)     Protein (g):  60-70 g/pro/day; Weight Used for Protein Requirements:  Ideal (1.2-1.4 g/kg)        Fluid (ml/day):  2466-4723 mL/fluid/day; Method Used for Fluid Requirements:  1 ml/kcal      Nutrition Related Findings:  Trace BLE edema      Current Nutrition Therapies:    ADULT DIET; Easy to Chew; Low Fat/Low Chol/High Fiber/YOLY    Anthropometric Measures:  · Height: 5' 2\" (157.5 cm)  · Current Body Weight: 150 lb (68 kg)   · Admission Body Weight: 150 lb (68 kg)    · Usual Body Weight: 160 lb (72.6 kg) (9/2021)     · Ideal Body Weight: 110 lbs  · BMI: 27.4   · BMI Categories: Overweight (BMI 25.0-29. 9)       Nutrition Diagnosis:   · Inadequate oral intake related to cognitive or neurological impairment as evidenced by poor intake prior to admission, weight loss    Nutrition Interventions:   Food and/or Nutrient Delivery:  Continue Current Diet, Start Oral Nutrition Supplement  Nutrition Education/Counseling:  Education not indicated   Coordination of Nutrition Care:  Continue to monitor while inpatient    Goals:  Po intake 50% or greater of meals. Nutrition Monitoring and Evaluation:   Food/Nutrient Intake Outcomes:  Food and Nutrient Intake, Supplement Intake  Physical Signs/Symptoms Outcomes:  Biochemical Data, Nutrition Focused Physical Findings, Weight, Fluid Status or Edema     Discharge Planning:     Too soon to determine     Electronically signed by Crystal Dhaliwal MS, RD, LD on 11/18/21 at 3:37 PM CST    Contact: [phone]

## 2021-11-18 NOTE — PLAN OF CARE
Problem: Falls - Risk of:  Goal: Will remain free from falls  Description: Will remain free from falls  11/17/2021 2337 by Susana Redmond RN  Outcome: Ongoing  11/17/2021 1704 by Hernandez Almazan RN  Outcome: Ongoing  Goal: Absence of physical injury  Description: Absence of physical injury  11/17/2021 2337 by Susana Redmond RN  Outcome: Ongoing  11/17/2021 1704 by Hernandez Almazan RN  Outcome: Ongoing     Problem: Skin Integrity:  Goal: Will show no infection signs and symptoms  Description: Will show no infection signs and symptoms  11/17/2021 2337 by Susana Redmond RN  Outcome: Ongoing  11/17/2021 1704 by Hernandez Almazan RN  Outcome: Ongoing  Goal: Absence of new skin breakdown  Description: Absence of new skin breakdown  11/17/2021 2337 by Susana Redmond RN  Outcome: Ongoing  11/17/2021 1704 by Hernandez Almazan RN  Outcome: Ongoing  Goal: Demonstration of wound healing without infection will improve  Description: Demonstration of wound healing without infection will improve  11/17/2021 2337 by Susana Redmond RN  Outcome: Ongoing  11/17/2021 1704 by Hernandez Almazan RN  Outcome: Ongoing  Goal: Complications related to intravenous access or infusion will be avoided or minimized  Description: Complications related to intravenous access or infusion will be avoided or minimized  11/17/2021 2337 by Susana Redmond RN  Outcome: Ongoing  11/17/2021 1704 by Hernandez Almazan RN  Outcome: Ongoing     Problem: Confusion - Acute:  Goal: Absence of continued neurological deterioration signs and symptoms  Description: Absence of continued neurological deterioration signs and symptoms  11/17/2021 2337 by Susana Redmond RN  Outcome: Ongoing  11/17/2021 1704 by Hernandez Almazan RN  Outcome: Ongoing  Goal: Mental status will be restored to baseline  Description: Mental status will be restored to baseline  11/17/2021 2337 by Susana Redmond RN  Outcome: Ongoing  11/17/2021 1704 by Hernandez Almazan RN  Outcome: Ongoing     Problem: Discharge Planning:  Goal: Ability to perform activities of daily living will improve  Description: Ability to perform activities of daily living will improve  11/17/2021 2337 by Johnnie Navas RN  Outcome: Ongoing  11/17/2021 1704 by Carlitos Rodriguez RN  Outcome: Ongoing  Goal: Participates in care planning  Description: Participates in care planning  11/17/2021 2337 by Johnnie Navas RN  Outcome: Ongoing  11/17/2021 1704 by Carlitos Rodriguez RN  Outcome: Ongoing  Goal: Discharged to appropriate level of care  Description: Discharged to appropriate level of care  11/17/2021 2337 by Johnnie Navas RN  Outcome: Ongoing  11/17/2021 1704 by Carlitos Rodriguez RN  Outcome: Ongoing     Problem: Injury - Risk of, Physical Injury:  Goal: Will remain free from falls  Description: Will remain free from falls  11/17/2021 2337 by Johnnie Navas RN  Outcome: Ongoing  11/17/2021 1704 by Carlitos Rodriguez RN  Outcome: Ongoing  Goal: Absence of physical injury  Description: Absence of physical injury  11/17/2021 2337 by Johnnie Navas RN  Outcome: Ongoing  11/17/2021 1704 by Carlitos Rodriguez RN  Outcome: Ongoing     Problem: Mood - Altered:  Goal: Mood stable  Description: Mood stable  11/17/2021 2337 by Johnnie Navas RN  Outcome: Ongoing  11/17/2021 1704 by Carlitos Rodriguez RN  Outcome: Ongoing  Goal: Absence of abusive behavior  Description: Absence of abusive behavior  11/17/2021 2337 by Johnnie Navas RN  Outcome: Ongoing  11/17/2021 1704 by Carlitos Rodriguez RN  Outcome: Ongoing  Goal: Verbalizations of feeling emotionally comfortable while being cared for will increase  Description: Verbalizations of feeling emotionally comfortable while being cared for will increase  11/17/2021 2337 by Johnnie Navas RN  Outcome: Ongoing  11/17/2021 1704 by Carlitos Rodriguez RN  Outcome: Ongoing     Problem: Psychomotor Activity - Altered:  Goal: Absence of psychomotor disturbance signs and symptoms  Description: Absence of psychomotor disturbance signs and symptoms  11/17/2021 2337 by Gary Gibson RN  Outcome: Ongoing  11/17/2021 1704 by Braydon Saenz RN  Outcome: Ongoing     Problem: Sensory Perception - Impaired:  Goal: Demonstrations of improved sensory functioning will increase  Description: Demonstrations of improved sensory functioning will increase  11/17/2021 2337 by Gary Gibson RN  Outcome: Ongoing  11/17/2021 1704 by Braydon Saenz RN  Outcome: Ongoing  Goal: Decrease in sensory misperception frequency  Description: Decrease in sensory misperception frequency  11/17/2021 2337 by Gary Gibson RN  Outcome: Ongoing  11/17/2021 1704 by Braydon Saenz RN  Outcome: Ongoing  Goal: Able to refrain from responding to false sensory perceptions  Description: Able to refrain from responding to false sensory perceptions  11/17/2021 2337 by Gary Gibson RN  Outcome: Ongoing  11/17/2021 1704 by Braydon Saenz RN  Outcome: Ongoing  Goal: Demonstrates accurate environmental perceptions  Description: Demonstrates accurate environmental perceptions  11/17/2021 2337 by Gary Gibson RN  Outcome: Ongoing  11/17/2021 1704 by Braydon Saenz RN  Outcome: Ongoing  Goal: Able to distinguish between reality-based and nonreality-based thinking  Description: Able to distinguish between reality-based and nonreality-based thinking  11/17/2021 2337 by Gary Gibson RN  Outcome: Ongoing  11/17/2021 1704 by Braydon Saenz RN  Outcome: Ongoing  Goal: Able to interrupt nonreality-based thinking  Description: Able to interrupt nonreality-based thinking  11/17/2021 2337 by Gary Gibson RN  Outcome: Ongoing  11/17/2021 1704 by Braydon Saenz RN  Outcome: Ongoing     Problem: Sleep Pattern Disturbance:  Goal: Appears well-rested  Description: Appears well-rested  11/17/2021 2337 by Gary Gibson RN  Outcome: Ongoing  11/17/2021 1704 by Braydon Saenz RN  Outcome: Ongoing     Problem: Infection:  Goal: Will remain free from infection  Description: Will remain free from infection  11/17/2021 2337 by Ariana Galvan RN  Outcome: Ongoing  11/17/2021 1704 by Maribell Priest RN  Outcome: Ongoing     Problem: Safety:  Goal: Free from accidental physical injury  Description: Free from accidental physical injury  11/17/2021 2337 by Ariana Galvan RN  Outcome: Ongoing  11/17/2021 1704 by Maribell Priest RN  Outcome: Ongoing  Goal: Free from intentional harm  Description: Free from intentional harm  11/17/2021 2337 by Ariana Galvan RN  Outcome: Ongoing  11/17/2021 1704 by Maribell Priest RN  Outcome: Ongoing  Goal: Ability to appropriately use an adaptive device for ambulation will improve  Description: Ability to appropriately use an adaptive device for ambulation will improve  Outcome: Ongoing  Goal: Ability to safely and independently change position in bed will improve  Description: Ability to safely and independently change position in bed will improve  Outcome: Ongoing  Goal: Ability to safely transfer will improve  Description: Ability to safely transfer will improve  Outcome: Ongoing     Problem: Daily Care:  Goal: Daily care needs are met  Description: Daily care needs are met  11/17/2021 2337 by Ariana Galvan RN  Outcome: Ongoing  11/17/2021 1704 by Maribell Priest RN  Outcome: Ongoing     Problem: Pain:  Goal: Patient's pain/discomfort is manageable  Description: Patient's pain/discomfort is manageable  11/17/2021 2337 by Ariana Galvan RN  Outcome: Ongoing  11/17/2021 1704 by Maribell Priest RN  Outcome: Ongoing     Problem: Skin Integrity:  Goal: Skin integrity will stabilize  Description: Skin integrity will stabilize  11/17/2021 2337 by Ariana Galvan RN  Outcome: Ongoing  11/17/2021 1704 by Maribell Priest RN  Outcome: Ongoing     Problem: Discharge Planning:  Goal: Patients continuum of care needs are met  Description: Patients continuum of care needs are met  11/17/2021 2337 by Ariana Galvan RN  Outcome: Ongoing  11/17/2021 1704 by Maribell Priest RN  Outcome: Ongoing Problem: Health Behavior:  Goal: Compliance with treatment plan for underlying cause of condition will improve  Description: Compliance with treatment plan for underlying cause of condition will improve  11/17/2021 2337 by Estefany Espinal RN  Outcome: Ongoing  11/17/2021 1704 by Samira Elizondo RN  Outcome: Ongoing  Goal: Identification of resources available to assist in meeting health care needs will improve  Description: Identification of resources available to assist in meeting health care needs will improve  11/17/2021 2337 by Estefany Espinal RN  Outcome: Ongoing  11/17/2021 1704 by Samira Elizondo RN  Outcome: Ongoing  Goal: Ability to identify changes in lifestyle to reduce recurrence of condition will improve  Description: Ability to identify changes in lifestyle to reduce recurrence of condition will improve  Outcome: Ongoing  Goal: Ability to manage health-related needs will improve  Description: Ability to manage health-related needs will improve  Outcome: Ongoing     Problem: Fluid Volume:  Goal: Maintenance of adequate hydration will improve  Description: Maintenance of adequate hydration will improve  11/17/2021 2337 by Estefany Espinal RN  Outcome: Ongoing  11/17/2021 1704 by Samira Elizondo RN  Outcome: Ongoing     Problem: Urinary Elimination:  Goal: Will remain free from infection  Description: Will remain free from infection  11/17/2021 2337 by Estefany Espinal RN  Outcome: Ongoing  11/17/2021 1704 by Samira Elizondo RN  Outcome: Ongoing  Goal: Skin integrity will improve  Description: Skin integrity will improve  11/17/2021 2337 by Estefany Espinal RN  Outcome: Ongoing  11/17/2021 1704 by Samira Elizondo RN  Outcome: Ongoing  Goal: Ability to recognize the need to void and respond appropriately will improve  Description: Ability to recognize the need to void and respond appropriately will improve  11/17/2021 2337 by Estefany Espinal RN  Outcome: Ongoing  11/17/2021 1704 by Samira Elizondo RN  Outcome: Ongoing  Goal: Incidence of incontinence will decrease  Description: Incidence of incontinence will decrease  11/17/2021 2337 by Gary Gibson RN  Outcome: Ongoing  11/17/2021 1704 by Braydon Saenz RN  Outcome: Ongoing     Problem: Nutritional:  Goal: Nutritional status will improve  Description: Nutritional status will improve  11/17/2021 2337 by Gary Gibson RN  Outcome: Ongoing  11/17/2021 1704 by Braydon Saenz RN  Outcome: Ongoing  Goal: Ability to identify appropriate dietary choices will improve  Description: Ability to identify appropriate dietary choices will improve  Outcome: Ongoing  Goal: Ability to chew and swallow food without choking will improve  Outcome: Ongoing  Goal: Ability to achieve adequate nutritional intake will improve  Outcome: Ongoing  Goal: Ability to maintain an optimal weight for height and age will improve  Outcome: Ongoing  Goal: Consumption of the prescribed amount of daily calories will improve  Description: Ability to maintain an optimal weight for height and age will improve  Outcome: Ongoing  Goal: Ability to make healthy dietary choices will improve  Description: Consumption of the prescribed amount of daily calories will improve  Outcome: Ongoing  Goal: Progress toward achieving an optimal weight will improve  Description: Ability to make healthy dietary choices will improve  Outcome: Ongoing     Problem: Physical Regulation:  Goal: Will remain free from infection  Description: Will remain free from infection  11/17/2021 2337 by Gary Gibson RN  Outcome: Ongoing  11/17/2021 1704 by Braydon Saenz RN  Outcome: Ongoing  Goal: Compliance with treatment plan for underlying cause of condition will improve  Description: Compliance with treatment plan for underlying cause of condition will improve  11/17/2021 2337 by Gary Gibson RN  Outcome: Ongoing  11/17/2021 1704 by Braydon Saenz RN  Outcome: Ongoing  Goal: Diagnostic test results will improve  Description: Diagnostic test results will improve  11/17/2021 2337 by Hoang Sams RN  Outcome: Ongoing  11/17/2021 1704 by Juliana Melvin RN  Outcome: Ongoing  Goal: Ability to maintain vital signs within normal range will improve  Description: Ability to maintain vital signs within normal range will improve  11/17/2021 2337 by Hoang Sams RN  Outcome: Ongoing  11/17/2021 1704 by Juliana Melvin RN  Outcome: Ongoing  Goal: Complications related to the disease process, condition or treatment will be avoided or minimized  Description: Complications related to the disease process, condition or treatment will be avoided or minimized  Outcome: Ongoing  Goal: Ability to avoid complications of mobility impairment will improve  Outcome: Ongoing     Problem: Respiratory:  Goal: Ability to maintain normal respiratory secretions will improve  Description: Ability to maintain normal respiratory secretions will improve  11/17/2021 2337 by Hoang Sams RN  Outcome: Ongoing  11/17/2021 1704 by Juliana Melvin RN  Outcome: Ongoing  Goal: Ability to maintain adequate ventilation will improve  Description: Ability to maintain adequate ventilation will improve  Outcome: Ongoing  Goal: Levels of oxygenation will improve  Outcome: Ongoing     Problem: Bleeding:  Goal: Will show no signs and symptoms of excessive bleeding  Description: Will show no signs and symptoms of excessive bleeding  Outcome: Ongoing     Problem:  Activity:  Goal: Risk for activity intolerance will decrease  Description: Risk for activity intolerance will decrease  Outcome: Ongoing  Goal: Will verbalize the importance of balancing activity with adequate rest periods  Description: Will verbalize the importance of balancing activity with adequate rest periods  Outcome: Ongoing  Goal: Ability to tolerate increased activity will improve  Description: Ability to tolerate increased activity will improve  Outcome: Ongoing  Goal: Amount of time patient spends in regular exercise will increase  Description: Amount of time patient spends in regular exercise will increase  Outcome: Ongoing  Goal: Sleep-wake cycle will improve  Description: Sleep-wake cycle will improve  Outcome: Ongoing  Goal: Ability to ambulate will improve  Description: Ability to ambulate will improve  Outcome: Ongoing  Goal: Muscle strength will improve  Description: Muscle strength will improve  Outcome: Ongoing  Goal: Range of joint motion will improve  Description: Range of joint motion will improve  Outcome: Ongoing     Problem: Cardiac:  Goal: Diagnostic test results will improve  Description: Diagnostic test results will improve  11/17/2021 2337 by Ruchi Davis RN  Outcome: Ongoing  11/17/2021 1704 by Latoya Garcia RN  Outcome: Ongoing  Goal: Complications related to the disease process, condition or treatment will be avoided or minimized  Description: Complications related to the disease process, condition or treatment will be avoided or minimized  Outcome: Ongoing  Goal: Ability to maintain an adequate cardiac output will improve  Description: Ability to maintain an adequate cardiac output will improve  Outcome: Ongoing  Goal: Hemodynamic stability will improve  Description: Hemodynamic stability will improve  Outcome: Ongoing  Goal: Cerebral tissue perfusion will improve  Description: Cerebral tissue perfusion will improve  Outcome: Ongoing     Problem: Coping:  Goal: Ability to verbalize feelings about condition will improve  Description: Ability to verbalize feelings about condition will improve  Outcome: Ongoing  Goal: Ability to identify strategies to decrease anxiety will improve  Description: Ability to identify strategies to decrease anxiety will improve  Outcome: Ongoing  Goal: Ability to identify and alter barriers to satisfying sexual function will improve  Description: Ability to identify and alter barriers to satisfying sexual function will improve  Outcome: Ongoing  Goal: Ability to identify and develop effective coping behavior will improve  Description: Ability to identify and develop effective coping behavior will improve  Outcome: Ongoing     Problem: Sensory:  Goal: Pain level will decrease  Description: Pain level will decrease  Outcome: Ongoing  Goal: General experience of comfort will improve  Description: General experience of comfort will improve  Outcome: Ongoing     Problem: ABCDS Injury Assessment  Goal: Absence of physical injury  Outcome: Ongoing     Problem: SAFETY  Goal: Free from accidental physical injury  Outcome: Ongoing  Goal: Free from intentional harm  Outcome: Ongoing     Problem: DAILY CARE  Goal: Daily care needs are met  Outcome: Ongoing     Problem: PAIN  Goal: Patient's pain/discomfort is manageable  Outcome: Ongoing     Problem: SKIN INTEGRITY  Goal: Skin integrity is maintained or improved  Outcome: Ongoing     Problem: KNOWLEDGE DEFICIT  Goal: Patient/S.O. demonstrates understanding of disease process, treatment plan, medications, and discharge instructions. Outcome: Ongoing     Problem: DISCHARGE BARRIERS  Goal: Patient's continuum of care needs are met  Outcome: Ongoing     Problem:  Bowel/Gastric:  Goal: Will show no signs and symptoms of gastrointestinal bleeding  Description: Will show no signs and symptoms of gastrointestinal bleeding  Outcome: Ongoing     Problem: Mental Status - Impaired:  Goal: Mental status will improve  Description: Mental status will improve  Outcome: Ongoing     Problem: Self-Care:  Goal: Ability to participate in self-care as condition permits will improve  Description: Ability to participate in self-care as condition permits will improve  Outcome: Ongoing

## 2021-11-18 NOTE — ACP (ADVANCE CARE PLANNING)
Advance Care Planning     Advance Care Planning Activator (Inpatient)  Conversation Note      Date of ACP Conversation: 11/18/2021     Conversation Conducted with: Ki Gupta    ACP Activator: Vanessa Shen RN        Health Care Decision Maker:     Current Designated Health Care Decision Maker:     Primary Decision Maker: Josh Leyva - Child - [phone]    Secondary Decision Maker: Delmer Payne - Child - [phone]      Care Preferences    Ventilation: \"If you were in your present state of health and suddenly became very ill and were unable to breathe on your own, what would your preference be about the use of a ventilator (breathing machine) if it were available to you? \"      Would the patient desire the use of ventilator (breathing machine)?: NO    \"If your health worsens and it becomes clear that your chance of recovery is unlikely, what would your preference be about the use of a ventilator (breathing machine) if it were available to you? \"     Would the patient desire the use of ventilator (breathing machine)?: NO      Resuscitation  \"CPR works best to restart the heart when there is a sudden event, like a heart attack, in someone who is otherwise healthy. Unfortunately, CPR does not typically restart the heart for people who have serious health conditions or who are very sick. \"    \"In the event your heart stopped as a result of an underlying serious health condition, would you want attempts to be made to restart your heart (answer \"yes\" for attempt to resuscitate) or would you prefer a natural death (answer \"no\" for do not attempt to resuscitate)? \" NO        Length of ACP Conversation in minutes:      Conversation Outcomes:  [x] ACP discussion completed  [] Existing advance directive reviewed with patient; no changes to patient's previously recorded wishes  [] New Advance Directive completed  [] Portable Do Not Rescitate prepared for Provider review and signature  [] POLST/POST/MOLST/MOST prepared for Provider review and signature

## 2021-11-19 ENCOUNTER — APPOINTMENT (OUTPATIENT)
Dept: CT IMAGING | Age: 81
DRG: 072 | End: 2021-11-19
Payer: MEDICARE

## 2021-11-19 LAB
ALBUMIN SERPL-MCNC: 3.8 G/DL (ref 3.5–5.2)
ALP BLD-CCNC: 81 U/L (ref 35–104)
ALT SERPL-CCNC: 10 U/L (ref 5–33)
ANION GAP SERPL CALCULATED.3IONS-SCNC: 17 MMOL/L (ref 7–19)
AST SERPL-CCNC: 16 U/L (ref 5–32)
BASOPHILS ABSOLUTE: 0.1 K/UL (ref 0–0.2)
BASOPHILS RELATIVE PERCENT: 0.7 % (ref 0–1)
BILIRUB SERPL-MCNC: 0.6 MG/DL (ref 0.2–1.2)
BUN BLDV-MCNC: 26 MG/DL (ref 8–23)
CALCIUM SERPL-MCNC: 10.1 MG/DL (ref 8.8–10.2)
CHLORIDE BLD-SCNC: 104 MMOL/L (ref 98–111)
CO2: 19 MMOL/L (ref 22–29)
CREAT SERPL-MCNC: 1.4 MG/DL (ref 0.5–0.9)
EOSINOPHILS ABSOLUTE: 0.1 K/UL (ref 0–0.6)
EOSINOPHILS RELATIVE PERCENT: 1.8 % (ref 0–5)
GFR AFRICAN AMERICAN: 44
GFR NON-AFRICAN AMERICAN: 36
GLUCOSE BLD-MCNC: 120 MG/DL (ref 74–109)
HCT VFR BLD CALC: 43 % (ref 37–47)
HEMOGLOBIN: 13.1 G/DL (ref 12–16)
IMMATURE GRANULOCYTES #: 0 K/UL
LYMPHOCYTES ABSOLUTE: 1.5 K/UL (ref 1.1–4.5)
LYMPHOCYTES RELATIVE PERCENT: 20 % (ref 20–40)
MCH RBC QN AUTO: 27.4 PG (ref 27–31)
MCHC RBC AUTO-ENTMCNC: 30.5 G/DL (ref 33–37)
MCV RBC AUTO: 90 FL (ref 81–99)
MONOCYTES ABSOLUTE: 0.7 K/UL (ref 0–0.9)
MONOCYTES RELATIVE PERCENT: 9.6 % (ref 0–10)
NEUTROPHILS ABSOLUTE: 5 K/UL (ref 1.5–7.5)
NEUTROPHILS RELATIVE PERCENT: 67.8 % (ref 50–65)
PDW BLD-RTO: 15.4 % (ref 11.5–14.5)
PLATELET # BLD: 202 K/UL (ref 130–400)
PMV BLD AUTO: 10.8 FL (ref 9.4–12.3)
POTASSIUM REFLEX MAGNESIUM: 3.8 MMOL/L (ref 3.5–5)
RBC # BLD: 4.78 M/UL (ref 4.2–5.4)
SODIUM BLD-SCNC: 140 MMOL/L (ref 136–145)
TOTAL PROTEIN: 6.4 G/DL (ref 6.6–8.7)
URINE CULTURE, ROUTINE: NORMAL
WBC # BLD: 7.4 K/UL (ref 4.8–10.8)

## 2021-11-19 PROCEDURE — 1210000000 HC MED SURG R&B

## 2021-11-19 PROCEDURE — 51798 US URINE CAPACITY MEASURE: CPT

## 2021-11-19 PROCEDURE — 36415 COLL VENOUS BLD VENIPUNCTURE: CPT

## 2021-11-19 PROCEDURE — 6360000002 HC RX W HCPCS: Performed by: NURSE PRACTITIONER

## 2021-11-19 PROCEDURE — 6370000000 HC RX 637 (ALT 250 FOR IP): Performed by: NURSE PRACTITIONER

## 2021-11-19 PROCEDURE — 2500000003 HC RX 250 WO HCPCS: Performed by: NURSE PRACTITIONER

## 2021-11-19 PROCEDURE — 97530 THERAPEUTIC ACTIVITIES: CPT

## 2021-11-19 PROCEDURE — 97165 OT EVAL LOW COMPLEX 30 MIN: CPT

## 2021-11-19 PROCEDURE — 2580000003 HC RX 258: Performed by: NURSE PRACTITIONER

## 2021-11-19 PROCEDURE — 70450 CT HEAD/BRAIN W/O DYE: CPT

## 2021-11-19 PROCEDURE — 85025 COMPLETE CBC W/AUTO DIFF WBC: CPT

## 2021-11-19 PROCEDURE — 51701 INSERT BLADDER CATHETER: CPT

## 2021-11-19 PROCEDURE — 80053 COMPREHEN METABOLIC PANEL: CPT

## 2021-11-19 PROCEDURE — 6370000000 HC RX 637 (ALT 250 FOR IP): Performed by: INTERNAL MEDICINE

## 2021-11-19 RX ADMIN — LOSARTAN POTASSIUM 25 MG: 50 TABLET, FILM COATED ORAL at 09:00

## 2021-11-19 RX ADMIN — FUROSEMIDE 40 MG: 40 TABLET ORAL at 08:59

## 2021-11-19 RX ADMIN — ASPIRIN 81 MG: 81 TABLET, COATED ORAL at 08:59

## 2021-11-19 RX ADMIN — ENOXAPARIN SODIUM 30 MG: 100 INJECTION SUBCUTANEOUS at 16:40

## 2021-11-19 RX ADMIN — DOCUSATE SODIUM 100 MG: 100 CAPSULE, LIQUID FILLED ORAL at 08:59

## 2021-11-19 RX ADMIN — PIPERACILLIN AND TAZOBACTAM 3375 MG: 3; .375 INJECTION, POWDER, LYOPHILIZED, FOR SOLUTION INTRAVENOUS at 11:31

## 2021-11-19 RX ADMIN — IRON SUCROSE 200 MG: 20 INJECTION, SOLUTION INTRAVENOUS at 11:30

## 2021-11-19 RX ADMIN — AMLODIPINE BESYLATE 5 MG: 5 TABLET ORAL at 08:59

## 2021-11-19 RX ADMIN — ATORVASTATIN CALCIUM 20 MG: 20 TABLET, FILM COATED ORAL at 09:00

## 2021-11-19 RX ADMIN — ACETAMINOPHEN 650 MG: 325 TABLET ORAL at 09:05

## 2021-11-19 RX ADMIN — PIPERACILLIN AND TAZOBACTAM 3375 MG: 3; .375 INJECTION, POWDER, LYOPHILIZED, FOR SOLUTION INTRAVENOUS at 19:52

## 2021-11-19 RX ADMIN — SODIUM CHLORIDE, PRESERVATIVE FREE 10 ML: 5 INJECTION INTRAVENOUS at 19:52

## 2021-11-19 RX ADMIN — FAMOTIDINE 20 MG: 10 INJECTION, SOLUTION INTRAVENOUS at 11:30

## 2021-11-19 RX ADMIN — PIPERACILLIN AND TAZOBACTAM 3375 MG: 3; .375 INJECTION, POWDER, LYOPHILIZED, FOR SOLUTION INTRAVENOUS at 00:52

## 2021-11-19 ASSESSMENT — PAIN SCALES - GENERAL
PAINLEVEL_OUTOF10: 8
PAINLEVEL_OUTOF10: 0
PAINLEVEL_OUTOF10: 0
PAINLEVEL_OUTOF10: 8
PAINLEVEL_OUTOF10: 0

## 2021-11-19 NOTE — PLAN OF CARE
Problem: Falls - Risk of:  Goal: Will remain free from falls  Description: Will remain free from falls  Outcome: Ongoing  Goal: Absence of physical injury  Description: Absence of physical injury  Outcome: Ongoing     Problem: Skin Integrity:  Goal: Will show no infection signs and symptoms  Description: Will show no infection signs and symptoms  Outcome: Ongoing  Goal: Absence of new skin breakdown  Description: Absence of new skin breakdown  Outcome: Ongoing  Goal: Demonstration of wound healing without infection will improve  Description: Demonstration of wound healing without infection will improve  Outcome: Ongoing  Goal: Complications related to intravenous access or infusion will be avoided or minimized  Description: Complications related to intravenous access or infusion will be avoided or minimized  Outcome: Ongoing     Problem: Confusion - Acute:  Goal: Absence of continued neurological deterioration signs and symptoms  Description: Absence of continued neurological deterioration signs and symptoms  Outcome: Ongoing  Goal: Mental status will be restored to baseline  Description: Mental status will be restored to baseline  Outcome: Ongoing     Problem: Discharge Planning:  Goal: Ability to perform activities of daily living will improve  Description: Ability to perform activities of daily living will improve  Outcome: Ongoing  Goal: Participates in care planning  Description: Participates in care planning  Outcome: Ongoing  Goal: Discharged to appropriate level of care  Description: Discharged to appropriate level of care  Outcome: Ongoing     Problem: Injury - Risk of, Physical Injury:  Goal: Will remain free from falls  Description: Will remain free from falls  Outcome: Ongoing  Goal: Absence of physical injury  Description: Absence of physical injury  Outcome: Ongoing     Problem: Mood - Altered:  Goal: Mood stable  Description: Mood stable  Outcome: Ongoing  Goal: Absence of abusive behavior  Description: Absence of abusive behavior  Outcome: Ongoing  Goal: Verbalizations of feeling emotionally comfortable while being cared for will increase  Description: Verbalizations of feeling emotionally comfortable while being cared for will increase  Outcome: Ongoing     Problem: Psychomotor Activity - Altered:  Goal: Absence of psychomotor disturbance signs and symptoms  Description: Absence of psychomotor disturbance signs and symptoms  Outcome: Ongoing     Problem: Sensory Perception - Impaired:  Goal: Demonstrations of improved sensory functioning will increase  Description: Demonstrations of improved sensory functioning will increase  Outcome: Ongoing  Goal: Decrease in sensory misperception frequency  Description: Decrease in sensory misperception frequency  Outcome: Ongoing  Goal: Able to refrain from responding to false sensory perceptions  Description: Able to refrain from responding to false sensory perceptions  Outcome: Ongoing  Goal: Demonstrates accurate environmental perceptions  Description: Demonstrates accurate environmental perceptions  Outcome: Ongoing  Goal: Able to distinguish between reality-based and nonreality-based thinking  Description: Able to distinguish between reality-based and nonreality-based thinking  Outcome: Ongoing  Goal: Able to interrupt nonreality-based thinking  Description: Able to interrupt nonreality-based thinking  Outcome: Ongoing     Problem: Sleep Pattern Disturbance:  Goal: Appears well-rested  Description: Appears well-rested  Outcome: Ongoing     Problem: Infection:  Goal: Will remain free from infection  Description: Will remain free from infection  Outcome: Ongoing     Problem: Safety:  Goal: Free from accidental physical injury  Description: Free from accidental physical injury  Outcome: Ongoing  Goal: Free from intentional harm  Description: Free from intentional harm  Outcome: Ongoing  Goal: Ability to appropriately use an adaptive device for improve  Description: Diagnostic test results will improve  Outcome: Ongoing  Goal: Ability to maintain vital signs within normal range will improve  Description: Ability to maintain vital signs within normal range will improve  Outcome: Ongoing  Goal: Complications related to the disease process, condition or treatment will be avoided or minimized  Description: Complications related to the disease process, condition or treatment will be avoided or minimized  Outcome: Ongoing  Goal: Ability to avoid complications of mobility impairment will improve  Outcome: Ongoing     Problem: Respiratory:  Goal: Ability to maintain normal respiratory secretions will improve  Description: Ability to maintain normal respiratory secretions will improve  Outcome: Ongoing  Goal: Ability to maintain adequate ventilation will improve  Description: Ability to maintain adequate ventilation will improve  Outcome: Ongoing  Goal: Levels of oxygenation will improve  Outcome: Ongoing     Problem: Bleeding:  Goal: Will show no signs and symptoms of excessive bleeding  Description: Will show no signs and symptoms of excessive bleeding  Outcome: Ongoing     Problem:  Activity:  Goal: Risk for activity intolerance will decrease  Description: Risk for activity intolerance will decrease  Outcome: Ongoing  Goal: Will verbalize the importance of balancing activity with adequate rest periods  Description: Will verbalize the importance of balancing activity with adequate rest periods  Outcome: Ongoing  Goal: Ability to tolerate increased activity will improve  Description: Ability to tolerate increased activity will improve  Outcome: Ongoing  Goal: Amount of time patient spends in regular exercise will increase  Description: Amount of time patient spends in regular exercise will increase  Outcome: Ongoing  Goal: Sleep-wake cycle will improve  Description: Sleep-wake cycle will improve  Outcome: Ongoing  Goal: Ability to ambulate will improve  Description: Ability to ambulate will improve  Outcome: Ongoing  Goal: Muscle strength will improve  Description: Muscle strength will improve  Outcome: Ongoing  Goal: Range of joint motion will improve  Description: Range of joint motion will improve  Outcome: Ongoing     Problem: Cardiac:  Goal: Diagnostic test results will improve  Description: Diagnostic test results will improve  Outcome: Ongoing  Goal: Complications related to the disease process, condition or treatment will be avoided or minimized  Description: Complications related to the disease process, condition or treatment will be avoided or minimized  Outcome: Ongoing  Goal: Ability to maintain an adequate cardiac output will improve  Description: Ability to maintain an adequate cardiac output will improve  Outcome: Ongoing  Goal: Hemodynamic stability will improve  Description: Hemodynamic stability will improve  Outcome: Ongoing  Goal: Cerebral tissue perfusion will improve  Description: Cerebral tissue perfusion will improve  Outcome: Ongoing     Problem: Coping:  Goal: Ability to verbalize feelings about condition will improve  Description: Ability to verbalize feelings about condition will improve  Outcome: Ongoing  Goal: Ability to identify strategies to decrease anxiety will improve  Description: Ability to identify strategies to decrease anxiety will improve  Outcome: Ongoing  Goal: Ability to identify and alter barriers to satisfying sexual function will improve  Description: Ability to identify and alter barriers to satisfying sexual function will improve  Outcome: Ongoing  Goal: Ability to identify and develop effective coping behavior will improve  Description: Ability to identify and develop effective coping behavior will improve  Outcome: Ongoing     Problem: Sensory:  Goal: Pain level will decrease  Description: Pain level will decrease  Outcome: Ongoing  Goal: General experience of comfort will improve  Description: General experience of comfort will improve  Outcome: Ongoing     Problem: ABCDS Injury Assessment  Goal: Absence of physical injury  Outcome: Ongoing     Problem: SAFETY  Goal: Free from accidental physical injury  Outcome: Ongoing  Goal: Free from intentional harm  Outcome: Ongoing     Problem: DAILY CARE  Goal: Daily care needs are met  Outcome: Ongoing     Problem: PAIN  Goal: Patient's pain/discomfort is manageable  Outcome: Ongoing     Problem: SKIN INTEGRITY  Goal: Skin integrity is maintained or improved  Outcome: Ongoing     Problem: KNOWLEDGE DEFICIT  Goal: Patient/S.O. demonstrates understanding of disease process, treatment plan, medications, and discharge instructions. Outcome: Ongoing     Problem: DISCHARGE BARRIERS  Goal: Patient's continuum of care needs are met  Outcome: Ongoing     Problem:  Bowel/Gastric:  Goal: Will show no signs and symptoms of gastrointestinal bleeding  Description: Will show no signs and symptoms of gastrointestinal bleeding  Outcome: Ongoing     Problem: Mental Status - Impaired:  Goal: Mental status will improve  Description: Mental status will improve  Outcome: Ongoing     Problem: Self-Care:  Goal: Ability to participate in self-care as condition permits will improve  Description: Ability to participate in self-care as condition permits will improve  Outcome: Ongoing     Problem: Nutrition  Goal: Optimal nutrition therapy  11/19/2021 0057 by Margarita Cool RN  Outcome: Ongoing  11/18/2021 1538 by Rosa Reyes, MS, RD, LD  Outcome: Ongoing

## 2021-11-19 NOTE — PROGRESS NOTES
Occupational Therapy   Occupational Therapy Initial Assessment  Date: 2021   Patient Name: Lance Castillo  MRN: [id number]     : 1940    Date of Service: 2021    Discharge Recommendations:          Assessment   Performance deficits / Impairments: Decreased functional mobility ; Decreased endurance; Decreased cognition; Decreased safe awareness; Decreased strength; Decreased ADL status; Decreased balance  Assessment: Will progress as tolerated  Treatment Diagnosis: Syncope  Prognosis: Good  Decision Making: Low Complexity  REQUIRES OT FOLLOW UP: Yes  Activity Tolerance  Activity Tolerance: Patient limited by fatigue           Patient Diagnosis(es): The primary encounter diagnosis was Positive blood culture. Diagnoses of Delirium superimposed on dementia and Renal insufficiency were also pertinent to this visit. has a past medical history of Arthritis, Bladder infection, CAD (coronary artery disease), Cardiovascular disease, Chronic kidney disease, Colon polyps, Constipation, Gallbladder disease, GERD (gastroesophageal reflux disease), Gout, Heart attack ([address] Heart disease, Hyperlipidemia, Hypertension, Incontinence, and Thyroid disease. has a past surgical history that includes Cholecystectomy; Hysterectomy; and Coronary artery bypass graft.     Treatment Diagnosis: Syncope      Restrictions  Restrictions/Precautions  Restrictions/Precautions: Fall Risk    Subjective   General  Chart Reviewed: Yes  Patient assessed for rehabilitation services?: Yes  Family / Caregiver Present: Yes  Diagnosis: AMS  Patient Currently in Pain: No  Vital Signs  Patient Currently in Pain: No  Social/Functional History  Social/Functional History  Lives With: Daughter  Type of Home: House  Home Layout: One level  Home Equipment: 4 wheeled walker  ADL Assistance: Independent  Ambulation Assistance: Independent  Transfer Assistance: Independent  Additional Comments: Daughter reports pt was I with self care inside the house       Objective   Vision:  (unable to accurately assess as pt. keeps eyes closed during most of eval)  Hearing: Within functional limits    Orientation  Overall Orientation Status: Impaired  Orientation Level: Oriented to person; Disoriented to place;  Disoriented to time; Disoriented to situation        ADL  Feeding: Maximum assistance  Grooming: Maximum assistance  UE Bathing: Maximum assistance  LE Bathing: Dependent/Total  UE Dressing: Maximum assistance  LE Dressing: Dependent/Total  Toileting: Dependent/Total  Additional Comments: Very lethargic during eval whicm impairs ADLs           Transfers  Stand Step Transfers: Unable to assess  Sit to stand: Maximum assistance     Cognition  Overall Cognitive Status: Exceptions  Following Commands: Inconsistently follows commands  Attention Span: Difficulty attending to directions  Memory: Decreased recall of recent events; Decreased short term memory  Safety Judgement: Decreased awareness of need for assistance  Insights: Decreased awareness of deficits  Initiation: Requires cues for some  Sequencing: Requires cues for some                 LUE AROM (degrees)  LUE AROM : WFL  RUE AROM (degrees)  RUE AROM : WFL  LUE Strength  Gross LUE Strength: Exceptions to [facility]  L Shoulder Flex: 3+/5  L Shoulder ABduction: 3+/5  L Elbow Flex: 3+/5  L Elbow Ext: 3+/5  RUE Strength  Gross RUE Strength: Exceptions to [facility]  R Shoulder Flex: 3+/5  R Shoulder ABduction: 3+/5  R Elbow Flex: 3+/5  R Elbow Ext: 3+/5                   Plan   Plan  Times per week: 3-5x/week  Times per day: Daily    G-Code     OutComes Score                                                  AM-PAC Score             Goals  Short term goals  Time Frame for Short term goals: 1 week  Short term goal 1: CGA with toilet tfers  Short term goal 2: Yessica with seated LB dsg  Short term goal 3: Yessica with clothing mgmt in standing  Long term goals  Long term goal 1: Return to PLOF       Therapy Time   Individual Concurrent Group Co-treatment   Time In           Time Out           Minutes                   Dotty Nolan, OT

## 2021-11-19 NOTE — PROGRESS NOTES
[facility]      Progress Note    Patient:  Mat Avila  YOB: 1940  Date of Service: 11/19/2021  MRN: [id number]   Acct: [de-identified]   Primary Care Physician: Radha Cummings  Advance Directive: DNR-CC  Admit Date: 11/17/2021       Hospital Day: 2    Portions of this note have been copied forward, however, updated to reflect the most current clinical status of this patient. CHIEF COMPLAINT mental status    SUBJECTIVE: No improvement today    CUMULATIVE HOSPITAL COURSE: Patient is a 58-year-old was presented to the emergency room yesterday with altered mental status worsening over the past few weeks. Family had taken her to [facility] for altered mental status possible UTI. None was found at that time patient continued to decline so daughter brought her here. She was somnolent and would open eyes on command only. Today's not much different we did hold some drugs that might have come increased her altered mental status but is not thus far made any change. Did receive a call of 1 of 2 blood culture bottles positive from [facility].  I have not seen a hard copy of that yet we repeated them anyway. I felt like she was somewhat dehydrated she was on 80 of Lasix at home. Amie Kelley She was given a fluid bolus again today and repeat labs. Labs improved today but her mental status has not. She will answer simple questions. Daughter mentions today some jerking or spastic type movements of upper body. Will consult Neurology. Venofer also added. Review of Systems   Reason unable to perform ROS: aLTERED. All other systems reviewed and are negative.        Objective:   VITALS:  BP (!) 160/78   Pulse 62   Temp 97.5 °F (36.4 °C) (Temporal)   Resp 16   Ht 5' 2\" (1.575 m)   Wt 143 lb 1 oz (64.9 kg)   SpO2 98%   BMI 26.17 kg/m²   24HR INTAKE/OUTPUT:      Intake/Output Summary (Last 24 hours) at 11/19/2021 1415  Last data filed at 11/19/2021 1221  Gross per 24 hour Intake 554 ml   Output 1100 ml   Net -546 ml           Physical Exam  Vitals and nursing note reviewed. Constitutional:       Appearance: She is ill-appearing. HENT:      Head: Normocephalic. Mouth/Throat:      Mouth: Mucous membranes are moist.   Cardiovascular:      Rate and Rhythm: Normal rate and regular rhythm. Heart sounds: Murmur heard. Pulmonary:      Breath sounds: Normal breath sounds. Abdominal:      General: Abdomen is flat. Palpations: Abdomen is soft. Skin:     General: Skin is warm. Comments: POOR TURGOR   Neurological:      Comments: Somnolent.   Arouses to voice but says too tired to open eyes            Medications:      sodium chloride        iron sucrose  200 mg IntraVENous Daily    piperacillin-tazobactam  3,375 mg IntraVENous Q8H    furosemide  40 mg Oral Daily    docusate sodium  100 mg Oral Daily    atorvastatin  20 mg Oral Daily    aspirin  81 mg Oral Daily    amLODIPine  5 mg Oral Daily    levothyroxine  100 mcg Oral Daily    losartan  25 mg Oral Daily    sodium chloride flush  5-40 mL IntraVENous 2 times per day    enoxaparin  30 mg SubCUTAneous Q24H    famotidine (PEPCID) injection  20 mg IntraVENous Daily     sodium chloride, sodium chloride flush, ondansetron **OR** ondansetron, polyethylene glycol, acetaminophen **OR** acetaminophen  ADULT ORAL NUTRITION SUPPLEMENT; Dinner; Standard High Calorie/High Protein Oral Supplement  ADULT DIET; Easy to Chew; Low Fat/Low Chol/High Fiber/YOLY     Lab and other Data:     Recent Labs     11/17/21  1223 11/18/21  0250 11/19/21 0224   WBC 8.1 7.1 7.4   HGB 14.0 13.2 13.1    207 202     Recent Labs     11/17/21  1223 11/18/21  0250 11/19/21  0224    138 140   K 5.2* 4.2 3.8    103 104   CO2 21* 23 19*   BUN 29* 28* 26*   CREATININE 1.3* 1.5* 1.4*   GLUCOSE 114* 102 120*     Recent Labs     11/17/21  1223 11/18/21  0250 11/19/21  0224   AST 27 17 16   ALT 14 11 10   BILITOT 0.5 0.5 0.6 ALKPHOS 102 83 81     Troponin T: No results for input(s): TROPONINI in the last 72 hours. Pro-BNP: No results for input(s): BNP in the last 72 hours. INR: No results for input(s): INR in the last 72 hours. UA:  Recent Labs     11/17/21  1500   COLORU YELLOW   PHUR 7.0   CLARITYU Clear   SPECGRAV 1.009   LEUKOCYTESUR Negative   UROBILINOGEN 0.2   BILIRUBINUR Negative   BLOODU Negative   GLUCOSEU Negative     A1C: No results for input(s): LABA1C in the last 72 hours. ABG:No results for input(s): PHART, ZUF3GGG, PO2ART, NFX4CNZ, BEART, HGBAE, Y0GMDMDK, CARBOXHGBART in the last 72 hours. RAD:   XR CHEST (2 VW)    Result Date: 11/17/2021  XR CHEST (2 VW) 11/17/2021 11:28 AM HISTORY: Altered mental status, positive blood cultures COMPARISON: None. FINDINGS: No lung consolidation. No pleural effusion or pneumothorax. Prior coronary bypass. Heart size is normal. Pulmonary vasculature are nondilated. Slight right hilar prominence appears to be hilar vascular structures. No acute bony abnormality seen. 1. No consolidation/acute infiltrate identified. Prior coronary bypass. Signed by Dr Casper Nicole            Assessment/Plan   Principal Problem:    Altered mental status   Serial lab   [address] her with Zosyn for possible INF-thus far cultures negative    Fall precautions   Vital signs and fever every 4 hours   PT OT speech eval   Neurology consult  Active Problems:    Secondary hypertension   stable    Coronary artery disease involving native coronary artery of native heart without angina pectoris    Hyperlipidemia    Palliative care patient  Resolved Problems:    * No resolved hospital problems. *      Antibiotic: Zosyn    DVT Prophylaxis:  Lovenox    Discharge planning: Most likely SNF placement      Further Orders per Clinical course/attending.      Electronically signed by GIAN Burrows CNP on 11/19/2021 at 2:15 PM       EMR Dragon/Transcription disclaimer:   Much of this encounter note is an electronic transcription/translation of spoken language to printed text.  The electronic translation of spoken language may permit erroneous, or at times, nonsensical words or phrases to be inadvertently transcribed; although attempts have made to review the note for such errors, some may still exist.

## 2021-11-19 NOTE — PROGRESS NOTES
[facility]      Progress Note    Patient:  Arlene Gore  YOB: 1940  Date of Service: 11/18/2021  MRN: [id number]   Acct: [de-identified]   Primary Care Physician: Kendra Jacob  Advance Directive: DNR-CC  Admit Date: 11/17/2021       Hospital Day: 1    Portions of this note have been copied forward, however, updated to reflect the most current clinical status of this patient. CHIEF COMPLAINT mental status    SUBJECTIVE: No improvement today    CUMULATIVE HOSPITAL COURSE: Patient is a 79-year-old was presented to the emergency room yesterday with altered mental status worsening over the past few weeks. Family had taken her to [facility] for altered mental status possible UTI. None was found at that time patient continued to decline so daughter brought her here. She was somnolent and would open eyes on command only. Today's not much different we did hold some drugs that might have come increased her altered mental status but is not thus far made any change. Did receive a call of 1 of 2 blood culture bottles positive from [facility].  I have not seen a hard copy of that yet we repeated them anyway. [place] I felt like she was somewhat dehydrated she was on 80 of Lasix at home. Tru Villalobos She was given a fluid bolus again today and repeat labs pending. Review of Systems   Reason unable to perform ROS: aLTERED. All other systems reviewed and are negative. Objective:   VITALS:  BP (!) 162/73   Pulse 77   Temp 97.5 °F (36.4 °C) (Temporal)   Resp 14   Ht 5' 2\" (1.575 m)   Wt 150 lb (68 kg)   SpO2 98%   BMI 27.44 kg/m²   24HR INTAKE/OUTPUT:    Intake/Output Summary (Last 24 hours) at 11/18/2021 1824  Last data filed at 11/18/2021 1436  Gross per 24 hour   Intake 24 ml   Output --   Net 24 ml           Physical Exam  Vitals and nursing note reviewed. Constitutional:       Appearance: She is ill-appearing. HENT:      Head: Normocephalic.       Mouth/Throat: Mouth: Mucous membranes are moist.   Cardiovascular:      Rate and Rhythm: Normal rate and regular rhythm. Heart sounds: Murmur heard. Pulmonary:      Breath sounds: Normal breath sounds. Abdominal:      General: Abdomen is flat. Palpations: Abdomen is soft. Skin:     General: Skin is warm. Comments: POOR TURGOR   Neurological:      Comments: Somnolent. Arouses to voice but says too tired to open eyes            Medications:      sodium chloride        piperacillin-tazobactam  3,375 mg IntraVENous Q8H    atorvastatin  20 mg Oral Daily    aspirin  81 mg Oral Daily    amLODIPine  5 mg Oral Daily    levothyroxine  100 mcg Oral Daily    losartan  25 mg Oral Daily    sodium chloride flush  5-40 mL IntraVENous 2 times per day    enoxaparin  30 mg SubCUTAneous Q24H    famotidine (PEPCID) injection  20 mg IntraVENous Daily     sodium chloride, sodium chloride flush, ondansetron **OR** ondansetron, polyethylene glycol, acetaminophen **OR** acetaminophen  ADULT ORAL NUTRITION SUPPLEMENT; Dinner; Standard High Calorie/High Protein Oral Supplement  ADULT DIET; Easy to Chew; Low Fat/Low Chol/High Fiber/YOLY     Lab and other Data:     Recent Labs     11/17/21  1223 11/18/21  0250   WBC 8.1 7.1   HGB 14.0 13.2    207     Recent Labs     11/17/21  1223 11/18/21  0250    138   K 5.2* 4.2    103   CO2 21* 23   BUN 29* 28*   CREATININE 1.3* 1.5*   GLUCOSE 114* 102     Recent Labs     11/17/21  1223 11/18/21  0250   AST 27 17   ALT 14 11   BILITOT 0.5 0.5   ALKPHOS 102 83     Troponin T: No results for input(s): TROPONINI in the last 72 hours. Pro-BNP: No results for input(s): BNP in the last 72 hours. INR: No results for input(s): INR in the last 72 hours.   UA:  Recent Labs     11/17/21  1500   COLORU YELLOW   PHUR 7.0   CLARITYU Clear   SPECGRAV 1.009   LEUKOCYTESUR Negative   UROBILINOGEN 0.2   BILIRUBINUR Negative   BLOODU Negative   GLUCOSEU Negative     A1C: No results for input(s): LABA1C in the last 72 hours. ABG:No results for input(s): PHART, SED6QPM, PO2ART, PSK9LIY, BEART, HGBAE, D0TPJYRO, CARBOXHGBART in the last 72 hours. RAD:   XR CHEST (2 VW)    Result Date: 11/17/2021  XR CHEST (2 VW) 11/17/2021 11:28 AM HISTORY: Altered mental status, positive blood cultures COMPARISON: None. FINDINGS: No lung consolidation. No pleural effusion or pneumothorax. Prior coronary bypass. Heart size is normal. Pulmonary vasculature are nondilated. Slight right hilar prominence appears to be hilar vascular structures. No acute bony abnormality seen. 1. No consolidation/acute infiltrate identified. Prior coronary bypass. Signed by Dr Oralia Holloway            Assessment/Plan   Principal Problem:    Altered mental status   Serial lab   Sedating medicine   IV bolus   Cover her with Zosyn for possible INF    Fall precautions   No signs and fever every 4 hours   PT OT speech eval  Active Problems:    Secondary hypertension   Day    Coronary artery disease involving native coronary artery of native heart without angina pectoris    Hyperlipidemia    Palliative care patient  Resolved Problems:    * No resolved hospital problems. *      Antibiotic: Zosyn    DVT Prophylaxis:  Lovenox    Discharge planning: Most likely SNF placement      Further Orders per Clinical course/attending. Electronically signed by GIAN Teryr CNP on 11/18/2021 at 6:24 PM       EMR Dragon/Transcription disclaimer:   Much of this encounter note is an electronic transcription/translation of spoken language to printed text.  The electronic translation of spoken language may permit erroneous, or at times, nonsensical words or phrases to be inadvertently transcribed; although attempts have made to review the note for such errors, some may still exist.

## 2021-11-19 NOTE — CARE COORDINATION
Date / Time of Evaluation:   11/19/2021    11:20 AM  Assessment Completed by:   ARVIND Keith      Patient Name:   Jorge Cruz  MRN:   [id number]  [place]:   1940    Patient Admission Status:   Inpatient [101]    Patient Contact Information:    [address]  [phone] (home)   Telephone Information:   Mobile [phone]   Mobile [phone]     Above information verified? [x]   Yes  []   No    (Best Practice:   Have patient/caregiver verify above address and phone number by stating out loud their current address and reachable phone number. Initial Assessment Completed at bedside with:  Patient's daughter; Pt is sleeping soundly and snoring    []   Patient  [x]   Family/Caregiver/Guardian   []   Other:      Current PCP:    José Miguel Shaikh    PCP verified? [x]   Yes  []   No    Emergency Contacts:    Extended Emergency Contact Information  Primary Emergency ContactVinsoraya Shay  [place] Phone: [phone]  Relation: Child  Preferred language: English   needed? No  Secondary Emergency Contact: [address] [place] [address], [address] [place] [address] Phone: [phone]  Relation: Child  Preferred language: English   needed? No    Advance Directives:    Does Ms. Javid Beyer have an advance directive in her electronic medical record? []   Yes  [x]   No    Code Status:   DNR-CC      Have you been vaccinated for COVID-19 (SARS-CoV-2)? [x]   Yes  []   No                   If so, when?     Which :         []   [employer]  [x]   Moderna  []   [employer]  []   Other:       Do you have any of the following unmet social needs that would keep you from returning home safely:    []   Yes  []   No                    Unmet Social Needs:           []   Living Situation/Housing  []   Food  []   Stroke Education   []   Utilities  []   Personal Safety  []   Financial Strain  []   Employment  []   Mental Health  []   Substance Abuse  []   Transportation Motor/Sensory         []   Right arm         []   Right leg         []   Left arm         []   Left leg  []   Language/Speech         []   Aphasia         []   Dysarthria         []   Swallow         [place] Coma Scale  Eye Opening: To speech  Best Verbal Response: Confused  Best Motor Response: Obeys commands  [place] Coma Scale Score: 13    Patient Deficit Notes: Additional CM/SW Notes: SW visited with Pt and daughter, at bedside, Pt sleeping soundly and snoring; she lives with this daughter and has since she moved here from [place] but has been independent until recently; has a rollator, BSC, bed rails, and shower equipment; daughter thinks she needs SNF prior to returning home; gave choice list and Medicare list; she wanted referrals to [facility] and [facility] SNFs;          Enzo Yadav and/or her family were provided with choice of provider:    [x]   Yes   []   No          Robert Campbell, [address]  Phone:   [phone]          Fax:   [phone]  Electronically signed by ARVNID Coleman on 11/19/2021 at 3:46 PM

## 2021-11-19 NOTE — PROGRESS NOTES
Physician Progress Note      PATIENT:               Milan Mendoza  CSN #:                  [id number]  :                       1940  ADMIT DATE:       2021 11:10 AM  DISCH DATE:  RESPONDING  PROVIDER #:        [facility]          QUERY TEXT:    Patient admitted with altered mental status worsening over the past few weeks,   noted to have creatinine 1.5 and GFR 33. If possible, please document in   progress notes and discharge summary if you are evaluating and/or treating any   of the following: The medical record reflects the following:  Risk Factors: HTN, dehydrated,  Clinical Indicators: creatinine 1.5., GFR 33. Treatment: Labs,  ml/hr,    Thank you  Yg De Los Santos RN, BSN, [facility]  [phone]  Options provided:  -- CKD Stage 3a GFR 45-59  -- CKD Stage 3b GFR 30-44  -- CKD Stage 4 GFR 15-29  -- Other - I will add my own diagnosis  -- Disagree - Not applicable / Not valid  -- Disagree - Clinically unable to determine / Unknown  -- Refer to Clinical Documentation Reviewer    PROVIDER RESPONSE TEXT:    This patient has CKD Stage 3b.     Query created by: Girish Alba on 2021 11:52 AM      Electronically signed by:  Jerry Galeano 2021 2:19 PM

## 2021-11-20 ENCOUNTER — APPOINTMENT (OUTPATIENT)
Dept: MRI IMAGING | Age: 81
DRG: 072 | End: 2021-11-20
Payer: MEDICARE

## 2021-11-20 LAB
ALBUMIN SERPL-MCNC: 3.5 G/DL (ref 3.5–5.2)
ALP BLD-CCNC: 74 U/L (ref 35–104)
ALT SERPL-CCNC: 10 U/L (ref 5–33)
ANION GAP SERPL CALCULATED.3IONS-SCNC: 12 MMOL/L (ref 7–19)
AST SERPL-CCNC: 15 U/L (ref 5–32)
BASOPHILS ABSOLUTE: 0.1 K/UL (ref 0–0.2)
BASOPHILS RELATIVE PERCENT: 0.7 % (ref 0–1)
BILIRUB SERPL-MCNC: 0.5 MG/DL (ref 0.2–1.2)
BUN BLDV-MCNC: 27 MG/DL (ref 8–23)
CALCIUM SERPL-MCNC: 10.2 MG/DL (ref 8.8–10.2)
CHLORIDE BLD-SCNC: 107 MMOL/L (ref 98–111)
CO2: 21 MMOL/L (ref 22–29)
CREAT SERPL-MCNC: 1.5 MG/DL (ref 0.5–0.9)
EKG P AXIS: -10 DEGREES
EKG P-R INTERVAL: 200 MS
EKG Q-T INTERVAL: 486 MS
EKG QRS DURATION: 168 MS
EKG QTC CALCULATION (BAZETT): 491 MS
EKG T AXIS: 118 DEGREES
EOSINOPHILS ABSOLUTE: 0.2 K/UL (ref 0–0.6)
EOSINOPHILS RELATIVE PERCENT: 2.3 % (ref 0–5)
GFR AFRICAN AMERICAN: 40
GFR NON-AFRICAN AMERICAN: 33
GLUCOSE BLD-MCNC: 112 MG/DL (ref 74–109)
HCT VFR BLD CALC: 42.5 % (ref 37–47)
HEMOGLOBIN: 13 G/DL (ref 12–16)
IMMATURE GRANULOCYTES #: 0 K/UL
LYMPHOCYTES ABSOLUTE: 1.6 K/UL (ref 1.1–4.5)
LYMPHOCYTES RELATIVE PERCENT: 22.1 % (ref 20–40)
MCH RBC QN AUTO: 27.1 PG (ref 27–31)
MCHC RBC AUTO-ENTMCNC: 30.6 G/DL (ref 33–37)
MCV RBC AUTO: 88.5 FL (ref 81–99)
MONOCYTES ABSOLUTE: 0.8 K/UL (ref 0–0.9)
MONOCYTES RELATIVE PERCENT: 10.5 % (ref 0–10)
NEUTROPHILS ABSOLUTE: 4.7 K/UL (ref 1.5–7.5)
NEUTROPHILS RELATIVE PERCENT: 64.3 % (ref 50–65)
PDW BLD-RTO: 15.5 % (ref 11.5–14.5)
PLATELET # BLD: 194 K/UL (ref 130–400)
PMV BLD AUTO: 10.8 FL (ref 9.4–12.3)
POTASSIUM REFLEX MAGNESIUM: 3.6 MMOL/L (ref 3.5–5)
RBC # BLD: 4.8 M/UL (ref 4.2–5.4)
SODIUM BLD-SCNC: 140 MMOL/L (ref 136–145)
TOTAL PROTEIN: 6.8 G/DL (ref 6.6–8.7)
VITAMIN D 1,25-DIHYDROXY: 43.8 PG/ML (ref 19.9–79.3)
WBC # BLD: 7.3 K/UL (ref 4.8–10.8)

## 2021-11-20 PROCEDURE — 6370000000 HC RX 637 (ALT 250 FOR IP): Performed by: INTERNAL MEDICINE

## 2021-11-20 PROCEDURE — 80053 COMPREHEN METABOLIC PANEL: CPT

## 2021-11-20 PROCEDURE — 93010 ELECTROCARDIOGRAM REPORT: CPT | Performed by: INTERNAL MEDICINE

## 2021-11-20 PROCEDURE — 70551 MRI BRAIN STEM W/O DYE: CPT

## 2021-11-20 PROCEDURE — 2500000003 HC RX 250 WO HCPCS: Performed by: NURSE PRACTITIONER

## 2021-11-20 PROCEDURE — 99223 1ST HOSP IP/OBS HIGH 75: CPT | Performed by: PSYCHIATRY & NEUROLOGY

## 2021-11-20 PROCEDURE — 6370000000 HC RX 637 (ALT 250 FOR IP): Performed by: NURSE PRACTITIONER

## 2021-11-20 PROCEDURE — 95819 EEG AWAKE AND ASLEEP: CPT | Performed by: PSYCHIATRY & NEUROLOGY

## 2021-11-20 PROCEDURE — 85025 COMPLETE CBC W/AUTO DIFF WBC: CPT

## 2021-11-20 PROCEDURE — 36415 COLL VENOUS BLD VENIPUNCTURE: CPT

## 2021-11-20 PROCEDURE — 1210000000 HC MED SURG R&B

## 2021-11-20 PROCEDURE — 95819 EEG AWAKE AND ASLEEP: CPT

## 2021-11-20 PROCEDURE — 6360000002 HC RX W HCPCS: Performed by: NURSE PRACTITIONER

## 2021-11-20 PROCEDURE — 2580000003 HC RX 258: Performed by: NURSE PRACTITIONER

## 2021-11-20 RX ORDER — MECOBALAMIN 5000 MCG
5 TABLET,DISINTEGRATING ORAL NIGHTLY PRN
Status: DISCONTINUED | OUTPATIENT
Start: 2021-11-20 | End: 2021-11-24 | Stop reason: HOSPADM

## 2021-11-20 RX ADMIN — ATORVASTATIN CALCIUM 20 MG: 20 TABLET, FILM COATED ORAL at 08:11

## 2021-11-20 RX ADMIN — Medication 5 MG: at 03:19

## 2021-11-20 RX ADMIN — SODIUM CHLORIDE, PRESERVATIVE FREE 5 ML: 5 INJECTION INTRAVENOUS at 21:00

## 2021-11-20 RX ADMIN — FUROSEMIDE 40 MG: 40 TABLET ORAL at 08:11

## 2021-11-20 RX ADMIN — AMLODIPINE BESYLATE 5 MG: 5 TABLET ORAL at 08:11

## 2021-11-20 RX ADMIN — PIPERACILLIN AND TAZOBACTAM 3375 MG: 3; .375 INJECTION, POWDER, LYOPHILIZED, FOR SOLUTION INTRAVENOUS at 03:18

## 2021-11-20 RX ADMIN — DOCUSATE SODIUM 100 MG: 100 CAPSULE, LIQUID FILLED ORAL at 08:10

## 2021-11-20 RX ADMIN — IRON SUCROSE 200 MG: 20 INJECTION, SOLUTION INTRAVENOUS at 08:11

## 2021-11-20 RX ADMIN — ENOXAPARIN SODIUM 30 MG: 100 INJECTION SUBCUTANEOUS at 17:34

## 2021-11-20 RX ADMIN — FAMOTIDINE 20 MG: 10 INJECTION, SOLUTION INTRAVENOUS at 08:11

## 2021-11-20 RX ADMIN — ASPIRIN 81 MG: 81 TABLET, COATED ORAL at 08:11

## 2021-11-20 RX ADMIN — LEVOTHYROXINE SODIUM 100 MCG: 50 TABLET ORAL at 08:10

## 2021-11-20 RX ADMIN — LOSARTAN POTASSIUM 25 MG: 50 TABLET, FILM COATED ORAL at 08:11

## 2021-11-20 ASSESSMENT — PAIN SCALES - GENERAL
PAINLEVEL_OUTOF10: 0

## 2021-11-20 NOTE — PROGRESS NOTES
[facility]      [facility]    Patient:  Lance Castillo  YOB: 1940  Date of Service: 11/20/2021  MRN: [id number]   Acct: [de-identified]   Primary Care Physician: Gloria Arzate  Advance Directive: DNR-CC  Admit Date: 11/17/2021       Hospital Day: 3    Portions of this note have been copied forward, however, updated to reflect the most current clinical status of this patient. CHIEF COMPLAINT mental status    SUBJECTIVE: No improvement today    CUMULATIVE HOSPITAL COURSE: Patient is a 79-year-old was presented to the emergency room yesterday with altered mental status worsening over the past few weeks. Family had taken her to [facility] for altered mental status possible UTI. None was found at that time patient continued to decline so daughter brought her here. She was somnolent and would open eyes on command only. Today's not much different we did hold some drugs that might have come increased her altered mental status but is not thus far made any change. Did receive a call of 1 of 2 blood culture bottles positive from [facility].  I have not seen a hard copy of that yet we repeated them anyway. I felt like she was somewhat dehydrated she was on 80 of Lasix at home. Eyad Lackey She was given a fluid bolus again today and repeat labs. Labs improved today but her mental status has not. She will answer simple questions. Daughter mentions today some jerking or spastic type movements of upper body. Neurology consulted. EEG and MRI pending. Venofer  added. Review of Systems   Reason unable to perform ROS: aLTERED. All other systems reviewed and are negative.        Objective:   VITALS:  BP (!) 144/78   Pulse 66   Temp 98.4 °F (36.9 °C)   Resp 18   Ht 5' 2\" (1.575 m)   Wt 143 lb 1 oz (64.9 kg)   SpO2 96%   BMI 26.17 kg/m²   24HR INTAKE/OUTPUT:      Intake/Output Summary (Last 24 hours) at 11/20/2021 1519  Last data filed at 11/20/2021 1057  Gross per 24 hour Intake 630 ml   Output 800 ml   Net -170 ml           Physical Exam  Vitals and nursing note reviewed. Constitutional:       Appearance: She is ill-appearing. HENT:      Head: Normocephalic. Mouth/Throat:      Mouth: Mucous membranes are moist.   Cardiovascular:      Rate and Rhythm: Normal rate and regular rhythm. Heart sounds: Murmur heard. Pulmonary:      Breath sounds: Normal breath sounds. Abdominal:      General: Abdomen is flat. Palpations: Abdomen is soft. Skin:     General: Skin is warm. Comments: POOR TURGOR   Neurological:      Comments: Somnolent.   Arouses to voice but says too tired to open eyes            Medications:      sodium chloride        iron sucrose  200 mg IntraVENous Daily    docusate sodium  100 mg Oral Daily    atorvastatin  20 mg Oral Daily    aspirin  81 mg Oral Daily    amLODIPine  5 mg Oral Daily    levothyroxine  100 mcg Oral Daily    losartan  25 mg Oral Daily    sodium chloride flush  5-40 mL IntraVENous 2 times per day    enoxaparin  30 mg SubCUTAneous Q24H    famotidine (PEPCID) injection  20 mg IntraVENous Daily     melatonin, sodium chloride, sodium chloride flush, ondansetron **OR** ondansetron, polyethylene glycol, acetaminophen **OR** acetaminophen  ADULT ORAL NUTRITION SUPPLEMENT; Dinner; Standard High Calorie/High Protein Oral Supplement  ADULT DIET; Easy to Chew; Low Fat/Low Chol/High Fiber/YOLY     Lab and other Data:     Recent Labs     11/18/21 0250 11/19/21 0224 11/20/21  0127   WBC 7.1 7.4 7.3   HGB 13.2 13.1 13.0    202 194     Recent Labs     11/18/21 0250 11/19/21 0224 11/20/21  0127    140 140   K 4.2 3.8 3.6    104 107   CO2 23 19* 21*   BUN 28* 26* 27*   CREATININE 1.5* 1.4* 1.5*   GLUCOSE 102 120* 112*     Recent Labs     11/18/21 0250 11/19/21 0224 11/20/21  0127   AST 17 16 15   ALT 11 10 10   BILITOT 0.5 0.6 0.5   ALKPHOS 83 81 74     Troponin T: No results for input(s): TROPONINI in the electronic transcription/translation of spoken language to printed text.  The electronic translation of spoken language may permit erroneous, or at times, nonsensical words or phrases to be inadvertently transcribed; although attempts have made to review the note for such errors, some may still exist.

## 2021-11-20 NOTE — CONSULTS
[address], [address]  [place], [place]  Phone [phone]     Neurology Consultation     Date of Admission: 2021 11:10 AM  Date of Consultation: 21    Attending Provider: Gopi Torres MD  Consulting Provider: Kaleb Gage M.D. Patient: Rafia Vasquez  :  1940  Age:  79 y.o. MRN:  [id number]    CHIEF COMPLAINT:  Confusion     History Source: History obtained from chart review and daughter. PCP: Mendel Calix    HISTORY OF PRESENT ILLNESS:   Rafia Vasquez is a 79y.o. year old woman with a history of hypertension, hyperlipidemia, and cardiovascular disease who was admitted  with confusion and hallucinations. Ms. Ahmet Saldivar has resided with her daughter in [place] for the past 2 years. She is independent with personal care. 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 Her daughter has noted some intermittent memory decline for the past two months. For the past 2 weeks, she has had worsened memory, periods of confusion, and visual hallucinations. She sees people but does not elaborate even when asked. She has had poor balance, generalized weakness, difficulty walking, incontinence. She was started on Cymbalta for some depression and the daughter thought that may be to blame. There were no other new medications, no other psychiatric medications. She has had no head injuries, strokes, seizures. Lately, she had developed some jerking in her arms. Her daughter took her to the ER in [place] where labs, UA, and CT head were unremarkable. She was called the following day as a blood culture was growing something. She was advised to come back to the ER but she instead brought her mother here. UA and blood cultures, CXR were negative. CT head showed age related changes.       PAST MEDICAL HISTORY:    Medical History:      Diagnosis Date    Arthritis     Bladder infection     CAD (coronary artery disease)     Cardiovascular disease     Chronic kidney disease     Colon polyps     Constipation     Gallbladder disease     GERD (gastroesophageal reflux disease)     Gout     Heart attack ([facility] [address]     Heart disease     Hyperlipidemia     Hypertension     Incontinence     Thyroid disease        Surgical History:      Procedure Laterality Date    CHOLECYSTECTOMY      CORONARY ARTERY BYPASS GRAFT      heart bypass    HYSTERECTOMY         Medications Prior to Admission:    Prior to Admission medications    Medication Sig Start Date End Date Taking? Authorizing Provider   DULoxetine (CYMBALTA) 30 MG extended release capsule Take 30 mg by mouth daily   Yes Historical Provider, MD   tolterodine (DETROL) 2 MG tablet Take 2 mg by mouth nightly   Yes Historical Provider, MD   allopurinol (ZYLOPRIM) 100 MG tablet Take 100 mg by mouth 2 times daily   Yes Historical Provider, MD   amLODIPine (NORVASC) 5 MG tablet Take 5 mg by mouth daily    Yes Historical Provider, MD   ammonium lactate (LAC-HYDRIN) 12 % lotion Apply topically as needed for Dry Skin Apply topically as needed.    Yes Historical Provider, MD   aspirin 81 MG EC tablet Take 81 mg by mouth daily   Yes Historical Provider, MD   atorvastatin (LIPITOR) 20 MG tablet Take 20 mg by mouth daily   Yes Historical Provider, MD   Cyanocobalamin 1000 MCG LOZG Take 1,000 mcg by mouth daily   Yes Historical Provider, MD   furosemide (LASIX) 80 MG tablet Take 80 mg by mouth daily   Yes Historical Provider, MD   levothyroxine (SYNTHROID) 100 MCG tablet Take 100 mcg by mouth Daily   Yes Historical Provider, MD   losartan (COZAAR) 25 MG tablet Take 25 mg by mouth daily   Yes Historical Provider, MD   magnesium oxide (MAG-OX) 400 MG tablet Take 400 mg by mouth 2 times daily    Yes Historical Provider, MD   nitroglycerin (NITRO-BID) 2 % ointment Place 0.5 inches onto the skin as needed     Historical Provider, MD   vitamin D 25 MCG (1000 UT) CAPS Take 1,000 Units by mouth daily     Historical Provider, MD shortness of breath. Cardiovascular: Negative for chest pain and palpitations. Gastrointestinal: Negative for nausea and vomiting. Endocrine: Negative for polydipsia and polyuria. Genitourinary: Positive for frequency and urgency. Musculoskeletal: Positive for arthralgias. Negative for back pain. Skin: Negative for rash and wound. Allergic/Immunologic: Negative for environmental allergies and food allergies. Neurological: Positive for weakness. Negative for dizziness, tremors, seizures, syncope, facial asymmetry, speech difficulty, light-headedness, numbness and headaches. Hematological: Negative for adenopathy. Bruises/bleeds easily. Psychiatric/Behavioral: Negative for dysphoric mood and self-injury. PHYSICAL EXAMINATION:  Vitals: BP (!) 150/71   Pulse 58   Temp 97.2 °F (36.2 °C)   Resp 14   Ht 5' 2\" (1.575 m)   Wt 143 lb 1 oz (64.9 kg)   SpO2 97%   BMI 26.17 kg/m²   General appearance:  She is an elderly woman who is noted to have some psychomotor slowing. Skin: Skin color, texture, turgor normal. No rashes or lesions  HEENT:  Atraumatic, normocephalic. No nuchal rigidity. Neck:no adenopathy, no carotid bruit, no JVD, supple, symmetrical, trachea midline and thyroid not enlarged, symmetric, no tenderness/mass/nodules  Lungs: clear to auscultation bilaterally  Heart: regular rate and rhythm, S1, S2 normal, no murmur, click, rub or gallop  Abdomen: soft, non-tender; bowel sounds normal; no masses,  no organomegaly  Extremities: extremities normal, atraumatic, no cyanosis or edema      NEUROLOGIC EXAMINATION:  Neurologic Exam     Mental Status   Oriented to person, place, and time. Speech: speech is normal   Level of consciousness: alert  Unable to name object. Able to repeat. Speech is fluent. Cranial Nerves     CN II   Visual acuity: (She cannot count fingers, recognize objects in entire VF)  Right visual field deficit: Entire VF seems impaired.   Difficult to tell due to confusion though. CN III, IV, VI   Pupils are equal, round, and reactive to light. CN VII   Facial expression full, symmetric. CN VIII   Hearing: impaired    CN IX, X   Palate: symmetric    CN XI   Right sternocleidomastoid strength: normal  Left sternocleidomastoid strength: normal  Right trapezius strength: normal  Left trapezius strength: normal    CN XII   Tongue: not atrophic  Fasciculations: absent  Tongue deviation: none  EOM are difficult to evaluate. She will not follow a finger. She will not look left, right, up or down when asked. It is not clear if this is due to confusion or inability. She is noted to have some hypomymia. Motor Exam   Muscle bulk: normal  Overall muscle tone: normal  Right arm pronator drift: absent  Left arm pronator drift: absent    Strength   Right neck flexion: 5/5  Left neck flexion: 5/5  Right neck extension: 5/5  Left neck extension: 5/5  Right deltoid: 5/5  Left deltoid: 5/5  Right biceps: 5/5  Left biceps: 5/5  Right triceps: 5/5  Left triceps: 5/5  Right wrist flexion: 5/5  Left wrist flexion: 5/5  Right wrist extension: 5/5  Left wrist extension: 5/5  Right interossei: 5/5  Left interossei: 5/5  Right iliopsoas: 3/5  Left iliopsoas: 3/5  Right quadriceps: 3/5  Left quadriceps: 3/5  Right glutei: 3/5  Left glutei: 3/5  Right anterior tibial: 3/5  Left anterior tibial: 3/5  Right posterior tibial: 3/5  Left posterior tibial: 3/5  Right peroneal: 3/5  Left peroneal: 3/5  Right gastroc: 3/5  Left gastroc: 3/5  She is noted to have global bradykinesia. She is noted to have asterixis in the UEs. Sensory Exam   Unreliable due to confusion. She is very slow to answer and often will not answer.      Gait, Coordination, and Reflexes     Coordination   Finger to nose coordination: normal (slow but no dysmetria)    Tremor   Resting tremor: absent  Intention tremor: absent  Action tremor: absent    Reflexes   Right brachioradialis: 2+  Left brachioradialis: 2+  Right biceps: 2+  Left biceps: 2+  Right triceps: 2+  Left triceps: 2+  Right patellar: 1+  Left patellar: 1+  Right achilles: 0  Left achilles: 0  Right plantar: normal  Left plantar: normal  Right Conner: absent  Left Conner: absent  Right ankle clonus: absent  Left ankle clonus: absent  Rapid alternating movements were slow. ADDITIONAL REVIEW:  CBC:    Recent Labs     11/18/21 0250 11/19/21 0224 11/20/21 0127   WBC 7.1 7.4 7.3   HGB 13.2 13.1 13.0    202 194     BMP:     Recent Labs     11/18/21 0250 11/19/21 0224 11/20/21 0127    140 140   K 4.2 3.8 3.6    104 107   CO2 23 19* 21*   BUN 28* 26* 27*   CREATININE 1.5* 1.4* 1.5*   GLUCOSE 102 120* 112*     Hepatic:  Recent Labs     11/18/21 0250 11/19/21 0224 11/20/21 0127   AST 17 16 15   ALT 11 10 10   BILITOT 0.5 0.6 0.5   ALKPHOS 83 81 74     Narrative   EXAMINATION:  CT HEAD WO CONTRAST  11/19/2021 6:47 PM   HISTORY: Altered mental status. Dementia. TECHNIQUE: Multiple axial images were obtained through the brain   without contrast infusion. Multiplanar images were reconstructed. DLP: 189 mGy-cm. Automated exposure control was utilized. COMPARISON: No comparison study. FINDINGS: There are no hemorrhage, edema or mass effect. There is mild   atrophy. There is low-density in the hemispheric white matter. Vascular calcification is noted. The visualized paranasal sinuses and   mastoid air cells are clear. No calvarial fracture is identified.       Impression   1. No hemorrhage, edema or mass effect. 2. Atrophy consistent with age. 3. Low-density in the hemispheric white matter is nonspecific and   likely due to chronic small vessel disease. Signed by Dr Kendy Parker     Narrative   XR CHEST (2 VW) 11/17/2021 11:28 AM   HISTORY: Altered mental status, positive blood cultures   COMPARISON: None. FINDINGS:    No lung consolidation. No pleural effusion or pneumothorax. Prior   coronary bypass.  Heart size is normal. Pulmonary vasculature are   nondilated. Slight right hilar prominence appears to be hilar vascular   structures. No acute bony abnormality seen.       Impression   1. No consolidation/acute infiltrate identified. Prior coronary   bypass. Signed by Dr Duc Whalen     TSH, B12, Vit D were normal.    IMPRESSION:  1. Dementia, encephalopathy, parkinsonism, ?cortical blindness, asterixis. 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 It may be that she had a top of the basilar type stroke but that would not explain everything. She appears to have significant asterixis but is not on any medication that would cause this and does not have metabolic derangements that would cause this. Other considerations include CJD, PSP  2. Hypertension  3. Hyperlipidemia  4. CAD    PLAN:  1. MRI head  2. EEG  3. Further evaluation pending above  4.  PT/OT/ST Bryan Brooke M.D.

## 2021-11-20 NOTE — PLAN OF CARE
Problem: Falls - Risk of:  Goal: Will remain free from falls  Description: Will remain free from falls  Outcome: Ongoing  Goal: Absence of physical injury  Description: Absence of physical injury  Outcome: Ongoing     Problem: Skin Integrity:  Goal: Will show no infection signs and symptoms  Description: Will show no infection signs and symptoms  Outcome: Ongoing  Goal: Absence of new skin breakdown  Description: Absence of new skin breakdown  Outcome: Ongoing  Goal: Demonstration of wound healing without infection will improve  Description: Demonstration of wound healing without infection will improve  Outcome: Ongoing  Goal: Complications related to intravenous access or infusion will be avoided or minimized  Description: Complications related to intravenous access or infusion will be avoided or minimized  Outcome: Ongoing     Problem: Confusion - Acute:  Goal: Absence of continued neurological deterioration signs and symptoms  Description: Absence of continued neurological deterioration signs and symptoms  Outcome: Ongoing  Goal: Mental status will be restored to baseline  Description: Mental status will be restored to baseline  Outcome: Ongoing     Problem: Discharge Planning:  Goal: Ability to perform activities of daily living will improve  Description: Ability to perform activities of daily living will improve  Outcome: Ongoing  Goal: Participates in care planning  Description: Participates in care planning  Outcome: Ongoing  Goal: Discharged to appropriate level of care  Description: Discharged to appropriate level of care  Outcome: Ongoing     Problem: Injury - Risk of, Physical Injury:  Goal: Will remain free from falls  Description: Will remain free from falls  Outcome: Ongoing  Goal: Absence of physical injury  Description: Absence of physical injury  Outcome: Ongoing     Problem: Mood - Altered:  Goal: Mood stable  Description: Mood stable  Outcome: Ongoing  Goal: Absence of abusive behavior  Description: Absence of abusive behavior  Outcome: Ongoing  Goal: Verbalizations of feeling emotionally comfortable while being cared for will increase  Description: Verbalizations of feeling emotionally comfortable while being cared for will increase  Outcome: Ongoing     Problem: Psychomotor Activity - Altered:  Goal: Absence of psychomotor disturbance signs and symptoms  Description: Absence of psychomotor disturbance signs and symptoms  Outcome: Ongoing     Problem: Sensory Perception - Impaired:  Goal: Demonstrations of improved sensory functioning will increase  Description: Demonstrations of improved sensory functioning will increase  Outcome: Ongoing  Goal: Decrease in sensory misperception frequency  Description: Decrease in sensory misperception frequency  Outcome: Ongoing  Goal: Able to refrain from responding to false sensory perceptions  Description: Able to refrain from responding to false sensory perceptions  Outcome: Ongoing  Goal: Demonstrates accurate environmental perceptions  Description: Demonstrates accurate environmental perceptions  Outcome: Ongoing  Goal: Able to distinguish between reality-based and nonreality-based thinking  Description: Able to distinguish between reality-based and nonreality-based thinking  Outcome: Ongoing  Goal: Able to interrupt nonreality-based thinking  Description: Able to interrupt nonreality-based thinking  Outcome: Ongoing     Problem: Sleep Pattern Disturbance:  Goal: Appears well-rested  Description: Appears well-rested  Outcome: Ongoing     Problem: Infection:  Goal: Will remain free from infection  Description: Will remain free from infection  Outcome: Ongoing     Problem: Safety:  Goal: Free from accidental physical injury  Description: Free from accidental physical injury  Outcome: Ongoing  Goal: Free from intentional harm  Description: Free from intentional harm  Outcome: Ongoing  Goal: Ability to appropriately use an adaptive device for ambulation will improve  Description: Ability to appropriately use an adaptive device for ambulation will improve  Outcome: Ongoing  Goal: Ability to safely and independently change position in bed will improve  Description: Ability to safely and independently change position in bed will improve  Outcome: Ongoing  Goal: Ability to safely transfer will improve  Description: Ability to safely transfer will improve  Outcome: Ongoing     Problem: Daily Care:  Goal: Daily care needs are met  Description: Daily care needs are met  Outcome: Ongoing     Problem: Pain:  Goal: Patient's pain/discomfort is manageable  Description: Patient's pain/discomfort is manageable  Outcome: Ongoing     Problem: Skin Integrity:  Goal: Skin integrity will stabilize  Description: Skin integrity will stabilize  Outcome: Ongoing     Problem: Discharge Planning:  Goal: Patients continuum of care needs are met  Description: Patients continuum of care needs are met  Outcome: Ongoing     Problem: Health Behavior:  Goal: Compliance with treatment plan for underlying cause of condition will improve  Description: Compliance with treatment plan for underlying cause of condition will improve  Outcome: Ongoing  Goal: Identification of resources available to assist in meeting health care needs will improve  Description: Identification of resources available to assist in meeting health care needs will improve  Outcome: Ongoing  Goal: Ability to identify changes in lifestyle to reduce recurrence of condition will improve  Description: Ability to identify changes in lifestyle to reduce recurrence of condition will improve  Outcome: Ongoing  Goal: Ability to manage health-related needs will improve  Description: Ability to manage health-related needs will improve  Outcome: Ongoing     Problem: Fluid Volume:  Goal: Maintenance of adequate hydration will improve  Description: Maintenance of adequate hydration will improve  Outcome: Ongoing     Problem: Urinary Elimination:  Goal: Will remain free from infection  Description: Will remain free from infection  Outcome: Ongoing  Goal: Skin integrity will improve  Description: Skin integrity will improve  Outcome: Ongoing  Goal: Ability to recognize the need to void and respond appropriately will improve  Description: Ability to recognize the need to void and respond appropriately will improve  Outcome: Ongoing  Goal: Incidence of incontinence will decrease  Description: Incidence of incontinence will decrease  Outcome: Ongoing     Problem: Nutritional:  Goal: Nutritional status will improve  Description: Nutritional status will improve  Outcome: Ongoing  Goal: Ability to identify appropriate dietary choices will improve  Description: Ability to identify appropriate dietary choices will improve  Outcome: Ongoing  Goal: Ability to chew and swallow food without choking will improve  Outcome: Ongoing  Goal: Ability to achieve adequate nutritional intake will improve  Outcome: Ongoing  Goal: Ability to maintain an optimal weight for height and age will improve  Outcome: Ongoing  Goal: Consumption of the prescribed amount of daily calories will improve  Description: Ability to maintain an optimal weight for height and age will improve  Outcome: Ongoing  Goal: Ability to make healthy dietary choices will improve  Description: Consumption of the prescribed amount of daily calories will improve  Outcome: Ongoing  Goal: Progress toward achieving an optimal weight will improve  Description: Ability to make healthy dietary choices will improve  Outcome: Ongoing     Problem: Physical Regulation:  Goal: Will remain free from infection  Description: Will remain free from infection  Outcome: Ongoing  Goal: Compliance with treatment plan for underlying cause of condition will improve  Description: Compliance with treatment plan for underlying cause of condition will improve  Outcome: Ongoing  Goal: Diagnostic test results will improve  Description: Diagnostic test results will improve  Outcome: Ongoing  Goal: Ability to maintain vital signs within normal range will improve  Description: Ability to maintain vital signs within normal range will improve  Outcome: Ongoing  Goal: Complications related to the disease process, condition or treatment will be avoided or minimized  Description: Complications related to the disease process, condition or treatment will be avoided or minimized  Outcome: Ongoing  Goal: Ability to avoid complications of mobility impairment will improve  Outcome: Ongoing     Problem: Cardiac:  Goal: Diagnostic test results will improve  Description: Diagnostic test results will improve  Outcome: Ongoing  Goal: Complications related to the disease process, condition or treatment will be avoided or minimized  Description: Complications related to the disease process, condition or treatment will be avoided or minimized  Outcome: Ongoing

## 2021-11-21 LAB
ALBUMIN SERPL-MCNC: 4.1 G/DL (ref 3.5–5.2)
ALP BLD-CCNC: 79 U/L (ref 35–104)
ALT SERPL-CCNC: 10 U/L (ref 5–33)
ANION GAP SERPL CALCULATED.3IONS-SCNC: 15 MMOL/L (ref 7–19)
AST SERPL-CCNC: 16 U/L (ref 5–32)
BASOPHILS ABSOLUTE: 0.1 K/UL (ref 0–0.2)
BASOPHILS RELATIVE PERCENT: 0.8 % (ref 0–1)
BILIRUB SERPL-MCNC: 0.5 MG/DL (ref 0.2–1.2)
BUN BLDV-MCNC: 26 MG/DL (ref 8–23)
CALCIUM SERPL-MCNC: 10.8 MG/DL (ref 8.8–10.2)
CHLORIDE BLD-SCNC: 106 MMOL/L (ref 98–111)
CO2: 24 MMOL/L (ref 22–29)
CREAT SERPL-MCNC: 1.4 MG/DL (ref 0.5–0.9)
EOSINOPHILS ABSOLUTE: 0.2 K/UL (ref 0–0.6)
EOSINOPHILS RELATIVE PERCENT: 2.9 % (ref 0–5)
GFR AFRICAN AMERICAN: 44
GFR NON-AFRICAN AMERICAN: 36
GLUCOSE BLD-MCNC: 110 MG/DL (ref 74–109)
HCT VFR BLD CALC: 43.8 % (ref 37–47)
HEMOGLOBIN: 13.3 G/DL (ref 12–16)
IMMATURE GRANULOCYTES #: 0 K/UL
LYMPHOCYTES ABSOLUTE: 1.7 K/UL (ref 1.1–4.5)
LYMPHOCYTES RELATIVE PERCENT: 26.4 % (ref 20–40)
MCH RBC QN AUTO: 27.3 PG (ref 27–31)
MCHC RBC AUTO-ENTMCNC: 30.4 G/DL (ref 33–37)
MCV RBC AUTO: 89.8 FL (ref 81–99)
MONOCYTES ABSOLUTE: 0.6 K/UL (ref 0–0.9)
MONOCYTES RELATIVE PERCENT: 9.4 % (ref 0–10)
NEUTROPHILS ABSOLUTE: 3.9 K/UL (ref 1.5–7.5)
NEUTROPHILS RELATIVE PERCENT: 60.3 % (ref 50–65)
PDW BLD-RTO: 15.8 % (ref 11.5–14.5)
PLATELET # BLD: 220 K/UL (ref 130–400)
PMV BLD AUTO: 10.6 FL (ref 9.4–12.3)
POTASSIUM REFLEX MAGNESIUM: 3.7 MMOL/L (ref 3.5–5)
PRO-BNP: 4462 PG/ML (ref 0–1800)
RBC # BLD: 4.88 M/UL (ref 4.2–5.4)
SODIUM BLD-SCNC: 145 MMOL/L (ref 136–145)
TOTAL PROTEIN: 6.9 G/DL (ref 6.6–8.7)
WBC # BLD: 6.5 K/UL (ref 4.8–10.8)

## 2021-11-21 PROCEDURE — 80053 COMPREHEN METABOLIC PANEL: CPT

## 2021-11-21 PROCEDURE — 85025 COMPLETE CBC W/AUTO DIFF WBC: CPT

## 2021-11-21 PROCEDURE — 36415 COLL VENOUS BLD VENIPUNCTURE: CPT

## 2021-11-21 PROCEDURE — 1210000000 HC MED SURG R&B

## 2021-11-21 PROCEDURE — 6360000002 HC RX W HCPCS: Performed by: NURSE PRACTITIONER

## 2021-11-21 PROCEDURE — 83880 ASSAY OF NATRIURETIC PEPTIDE: CPT

## 2021-11-21 PROCEDURE — 2580000003 HC RX 258: Performed by: NURSE PRACTITIONER

## 2021-11-21 PROCEDURE — 6370000000 HC RX 637 (ALT 250 FOR IP): Performed by: NURSE PRACTITIONER

## 2021-11-21 PROCEDURE — 6370000000 HC RX 637 (ALT 250 FOR IP): Performed by: INTERNAL MEDICINE

## 2021-11-21 PROCEDURE — 2500000003 HC RX 250 WO HCPCS: Performed by: NURSE PRACTITIONER

## 2021-11-21 PROCEDURE — 99233 SBSQ HOSP IP/OBS HIGH 50: CPT | Performed by: PSYCHIATRY & NEUROLOGY

## 2021-11-21 RX ORDER — 0.9 % SODIUM CHLORIDE 0.9 %
250 INTRAVENOUS SOLUTION INTRAVENOUS ONCE
Status: COMPLETED | OUTPATIENT
Start: 2021-11-21 | End: 2021-11-21

## 2021-11-21 RX ADMIN — SODIUM CHLORIDE 250 ML: 9 INJECTION, SOLUTION INTRAVENOUS at 16:01

## 2021-11-21 RX ADMIN — FAMOTIDINE 20 MG: 10 INJECTION, SOLUTION INTRAVENOUS at 09:32

## 2021-11-21 RX ADMIN — SODIUM CHLORIDE, PRESERVATIVE FREE 10 ML: 5 INJECTION INTRAVENOUS at 19:51

## 2021-11-21 RX ADMIN — AMLODIPINE BESYLATE 5 MG: 5 TABLET ORAL at 09:33

## 2021-11-21 RX ADMIN — DOCUSATE SODIUM 100 MG: 100 CAPSULE, LIQUID FILLED ORAL at 09:33

## 2021-11-21 RX ADMIN — Medication 5 MG: at 20:22

## 2021-11-21 RX ADMIN — LEVOTHYROXINE SODIUM 100 MCG: 50 TABLET ORAL at 05:07

## 2021-11-21 RX ADMIN — ASPIRIN 81 MG: 81 TABLET, COATED ORAL at 09:33

## 2021-11-21 RX ADMIN — ATORVASTATIN CALCIUM 20 MG: 20 TABLET, FILM COATED ORAL at 09:33

## 2021-11-21 RX ADMIN — IRON SUCROSE 200 MG: 20 INJECTION, SOLUTION INTRAVENOUS at 09:32

## 2021-11-21 RX ADMIN — LOSARTAN POTASSIUM 25 MG: 50 TABLET, FILM COATED ORAL at 09:33

## 2021-11-21 RX ADMIN — SODIUM CHLORIDE, PRESERVATIVE FREE 10 ML: 5 INJECTION INTRAVENOUS at 09:33

## 2021-11-21 RX ADMIN — ACETAMINOPHEN 650 MG: 325 TABLET ORAL at 18:36

## 2021-11-21 ASSESSMENT — ENCOUNTER SYMPTOMS
BACK PAIN: 0
NAUSEA: 0
SHORTNESS OF BREATH: 0
COUGH: 0
PHOTOPHOBIA: 0
VOMITING: 0

## 2021-11-21 ASSESSMENT — PAIN SCALES - GENERAL
PAINLEVEL_OUTOF10: 3
PAINLEVEL_OUTOF10: 2
PAINLEVEL_OUTOF10: 0
PAINLEVEL_OUTOF10: 0
PAINLEVEL_OUTOF10: 3

## 2021-11-21 NOTE — PLAN OF CARE
Problem: Falls - Risk of:  Goal: Will remain free from falls  Description: Will remain free from falls  Outcome: Ongoing  Goal: Absence of physical injury  Description: Absence of physical injury  Outcome: Ongoing     Problem: Skin Integrity:  Goal: Will show no infection signs and symptoms  Description: Will show no infection signs and symptoms  Outcome: Ongoing  Goal: Absence of new skin breakdown  Description: Absence of new skin breakdown  Outcome: Ongoing  Goal: Demonstration of wound healing without infection will improve  Description: Demonstration of wound healing without infection will improve  Outcome: Ongoing  Goal: Complications related to intravenous access or infusion will be avoided or minimized  Description: Complications related to intravenous access or infusion will be avoided or minimized  Outcome: Ongoing     Problem: Confusion - Acute:  Goal: Absence of continued neurological deterioration signs and symptoms  Description: Absence of continued neurological deterioration signs and symptoms  Outcome: Ongoing  Goal: Mental status will be restored to baseline  Description: Mental status will be restored to baseline  Outcome: Ongoing     Problem: Discharge Planning:  Goal: Ability to perform activities of daily living will improve  Description: Ability to perform activities of daily living will improve  Outcome: Ongoing  Goal: Participates in care planning  Description: Participates in care planning  Outcome: Ongoing  Goal: Discharged to appropriate level of care  Description: Discharged to appropriate level of care  Outcome: Ongoing     Problem: Injury - Risk of, Physical Injury:  Goal: Will remain free from falls  Description: Will remain free from falls  Outcome: Ongoing  Goal: Absence of physical injury  Description: Absence of physical injury  Outcome: Ongoing     Problem: Mood - Altered:  Goal: Mood stable  Description: Mood stable  Outcome: Ongoing  Goal: Absence of abusive behavior  Description: Absence of abusive behavior  Outcome: Ongoing  Goal: Verbalizations of feeling emotionally comfortable while being cared for will increase  Description: Verbalizations of feeling emotionally comfortable while being cared for will increase  Outcome: Ongoing     Problem: Psychomotor Activity - Altered:  Goal: Absence of psychomotor disturbance signs and symptoms  Description: Absence of psychomotor disturbance signs and symptoms  Outcome: Ongoing     Problem: Sensory Perception - Impaired:  Goal: Demonstrations of improved sensory functioning will increase  Description: Demonstrations of improved sensory functioning will increase  Outcome: Ongoing  Goal: Decrease in sensory misperception frequency  Description: Decrease in sensory misperception frequency  Outcome: Ongoing  Goal: Able to refrain from responding to false sensory perceptions  Description: Able to refrain from responding to false sensory perceptions  Outcome: Ongoing  Goal: Demonstrates accurate environmental perceptions  Description: Demonstrates accurate environmental perceptions  Outcome: Ongoing  Goal: Able to distinguish between reality-based and nonreality-based thinking  Description: Able to distinguish between reality-based and nonreality-based thinking  Outcome: Ongoing  Goal: Able to interrupt nonreality-based thinking  Description: Able to interrupt nonreality-based thinking  Outcome: Ongoing     Problem: Sleep Pattern Disturbance:  Goal: Appears well-rested  Description: Appears well-rested  Outcome: Ongoing     Problem: Infection:  Goal: Will remain free from infection  Description: Will remain free from infection  Outcome: Ongoing     Problem: Safety:  Goal: Free from accidental physical injury  Description: Free from accidental physical injury  Outcome: Ongoing  Goal: Free from intentional harm  Description: Free from intentional harm  Outcome: Ongoing  Goal: Ability to appropriately use an adaptive device for ambulation will improve  Description: Ability to appropriately use an adaptive device for ambulation will improve  Outcome: Ongoing  Goal: Ability to safely and independently change position in bed will improve  Description: Ability to safely and independently change position in bed will improve  Outcome: Ongoing  Goal: Ability to safely transfer will improve  Description: Ability to safely transfer will improve  Outcome: Ongoing     Problem: Daily Care:  Goal: Daily care needs are met  Description: Daily care needs are met  Outcome: Ongoing     Problem: Pain:  Goal: Patient's pain/discomfort is manageable  Description: Patient's pain/discomfort is manageable  Outcome: Ongoing     Problem: Skin Integrity:  Goal: Skin integrity will stabilize  Description: Skin integrity will stabilize  Outcome: Ongoing     Problem: Discharge Planning:  Goal: Patients continuum of care needs are met  Description: Patients continuum of care needs are met  Outcome: Ongoing     Problem: Health Behavior:  Goal: Compliance with treatment plan for underlying cause of condition will improve  Description: Compliance with treatment plan for underlying cause of condition will improve  Outcome: Ongoing  Goal: Identification of resources available to assist in meeting health care needs will improve  Description: Identification of resources available to assist in meeting health care needs will improve  Outcome: Ongoing  Goal: Ability to identify changes in lifestyle to reduce recurrence of condition will improve  Description: Ability to identify changes in lifestyle to reduce recurrence of condition will improve  Outcome: Ongoing  Goal: Ability to manage health-related needs will improve  Description: Ability to manage health-related needs will improve  Outcome: Ongoing     Problem: Fluid Volume:  Goal: Maintenance of adequate hydration will improve  Description: Maintenance of adequate hydration will improve  Outcome: Ongoing     Problem: Urinary Elimination:  Goal: Will remain free from infection  Description: Will remain free from infection  Outcome: Ongoing  Goal: Skin integrity will improve  Description: Skin integrity will improve  Outcome: Ongoing  Goal: Ability to recognize the need to void and respond appropriately will improve  Description: Ability to recognize the need to void and respond appropriately will improve  Outcome: Ongoing  Goal: Incidence of incontinence will decrease  Description: Incidence of incontinence will decrease  Outcome: Ongoing     Problem: Nutritional:  Goal: Nutritional status will improve  Description: Nutritional status will improve  Outcome: Ongoing  Goal: Ability to identify appropriate dietary choices will improve  Description: Ability to identify appropriate dietary choices will improve  Outcome: Ongoing  Goal: Ability to chew and swallow food without choking will improve  Outcome: Ongoing  Goal: Ability to achieve adequate nutritional intake will improve  Outcome: Ongoing  Goal: Ability to maintain an optimal weight for height and age will improve  Outcome: Ongoing  Goal: Consumption of the prescribed amount of daily calories will improve  Description: Ability to maintain an optimal weight for height and age will improve  Outcome: Ongoing  Goal: Ability to make healthy dietary choices will improve  Description: Consumption of the prescribed amount of daily calories will improve  Outcome: Ongoing  Goal: Progress toward achieving an optimal weight will improve  Description: Ability to make healthy dietary choices will improve  Outcome: Ongoing     Problem: Physical Regulation:  Goal: Will remain free from infection  Description: Will remain free from infection  Outcome: Ongoing  Goal: Compliance with treatment plan for underlying cause of condition will improve  Description: Compliance with treatment plan for underlying cause of condition will improve  Outcome: Ongoing  Goal: Diagnostic test results will improve  Description: Diagnostic test results will improve  Outcome: Ongoing  Goal: Ability to maintain vital signs within normal range will improve  Description: Ability to maintain vital signs within normal range will improve  Outcome: Ongoing  Goal: Complications related to the disease process, condition or treatment will be avoided or minimized  Description: Complications related to the disease process, condition or treatment will be avoided or minimized  Outcome: Ongoing  Goal: Ability to avoid complications of mobility impairment will improve  Outcome: Ongoing     Problem: Respiratory:  Goal: Ability to maintain normal respiratory secretions will improve  Description: Ability to maintain normal respiratory secretions will improve  Outcome: Ongoing  Goal: Ability to maintain adequate ventilation will improve  Description: Ability to maintain adequate ventilation will improve  Outcome: Ongoing  Goal: Levels of oxygenation will improve  Outcome: Ongoing     Problem: Bleeding:  Goal: Will show no signs and symptoms of excessive bleeding  Description: Will show no signs and symptoms of excessive bleeding  Outcome: Ongoing     Problem:  Activity:  Goal: Risk for activity intolerance will decrease  Description: Risk for activity intolerance will decrease  Outcome: Ongoing  Goal: Will verbalize the importance of balancing activity with adequate rest periods  Description: Will verbalize the importance of balancing activity with adequate rest periods  Outcome: Ongoing  Goal: Ability to tolerate increased activity will improve  Description: Ability to tolerate increased activity will improve  Outcome: Ongoing  Goal: Amount of time patient spends in regular exercise will increase  Description: Amount of time patient spends in regular exercise will increase  Outcome: Ongoing  Goal: Sleep-wake cycle will improve  Description: Sleep-wake cycle will improve  Outcome: Ongoing  Goal: Ability to ambulate will improve  Description: Ability to ambulate will improve  Outcome: Ongoing  Goal: Muscle strength will improve  Description: Muscle strength will improve  Outcome: Ongoing  Goal: Range of joint motion will improve  Description: Range of joint motion will improve  Outcome: Ongoing     Problem: Cardiac:  Goal: Diagnostic test results will improve  Description: Diagnostic test results will improve  Outcome: Ongoing  Goal: Complications related to the disease process, condition or treatment will be avoided or minimized  Description: Complications related to the disease process, condition or treatment will be avoided or minimized  Outcome: Ongoing  Goal: Ability to maintain an adequate cardiac output will improve  Description: Ability to maintain an adequate cardiac output will improve  Outcome: Ongoing  Goal: Hemodynamic stability will improve  Description: Hemodynamic stability will improve  Outcome: Ongoing  Goal: Cerebral tissue perfusion will improve  Description: Cerebral tissue perfusion will improve  Outcome: Ongoing     Problem: Coping:  Goal: Ability to verbalize feelings about condition will improve  Description: Ability to verbalize feelings about condition will improve  Outcome: Ongoing  Goal: Ability to identify strategies to decrease anxiety will improve  Description: Ability to identify strategies to decrease anxiety will improve  Outcome: Ongoing  Goal: Ability to identify and alter barriers to satisfying sexual function will improve  Description: Ability to identify and alter barriers to satisfying sexual function will improve  Outcome: Ongoing  Goal: Ability to identify and develop effective coping behavior will improve  Description: Ability to identify and develop effective coping behavior will improve  Outcome: Ongoing     Problem: Sensory:  Goal: Pain level will decrease  Description: Pain level will decrease  Outcome: Ongoing  Goal: General experience of comfort will improve  Description: General experience of comfort will improve  Outcome: Ongoing     Problem: ABCDS Injury Assessment  Goal: Absence of physical injury  Outcome: Ongoing     Problem: SAFETY  Goal: Free from accidental physical injury  Outcome: Ongoing  Goal: Free from intentional harm  Outcome: Ongoing     Problem: DAILY CARE  Goal: Daily care needs are met  Outcome: Ongoing     Problem: PAIN  Goal: Patient's pain/discomfort is manageable  Outcome: Ongoing     Problem: SKIN INTEGRITY  Goal: Skin integrity is maintained or improved  Outcome: Ongoing     Problem: KNOWLEDGE DEFICIT  Goal: Patient/S.O. demonstrates understanding of disease process, treatment plan, medications, and discharge instructions. Outcome: Ongoing     Problem: DISCHARGE BARRIERS  Goal: Patient's continuum of care needs are met  Outcome: Ongoing     Problem:  Bowel/Gastric:  Goal: Will show no signs and symptoms of gastrointestinal bleeding  Description: Will show no signs and symptoms of gastrointestinal bleeding  Outcome: Ongoing     Problem: Mental Status - Impaired:  Goal: Mental status will improve  Description: Mental status will improve  Outcome: Ongoing     Problem: Self-Care:  Goal: Ability to participate in self-care as condition permits will improve  Description: Ability to participate in self-care as condition permits will improve  Outcome: Ongoing     Problem: Nutrition  Goal: Optimal nutrition therapy  Outcome: Ongoing

## 2021-11-21 NOTE — PLAN OF CARE
Problem: Falls - Risk of:  Goal: Will remain free from falls  Description: Will remain free from falls  Outcome: Ongoing  Goal: Absence of physical injury  Description: Absence of physical injury  Outcome: Ongoing     Problem: Skin Integrity:  Goal: Will show no infection signs and symptoms  Description: Will show no infection signs and symptoms  Outcome: Ongoing  Goal: Absence of new skin breakdown  Description: Absence of new skin breakdown  Outcome: Ongoing  Goal: Demonstration of wound healing without infection will improve  Description: Demonstration of wound healing without infection will improve  Outcome: Ongoing  Goal: Complications related to intravenous access or infusion will be avoided or minimized  Description: Complications related to intravenous access or infusion will be avoided or minimized  Outcome: Ongoing     Problem: Confusion - Acute:  Goal: Absence of continued neurological deterioration signs and symptoms  Description: Absence of continued neurological deterioration signs and symptoms  Outcome: Ongoing  Goal: Mental status will be restored to baseline  Description: Mental status will be restored to baseline  Outcome: Ongoing     Problem: Discharge Planning:  Goal: Ability to perform activities of daily living will improve  Description: Ability to perform activities of daily living will improve  Outcome: Ongoing  Goal: Participates in care planning  Description: Participates in care planning  Outcome: Ongoing  Goal: Discharged to appropriate level of care  Description: Discharged to appropriate level of care  Outcome: Ongoing     Problem: Injury - Risk of, Physical Injury:  Goal: Will remain free from falls  Description: Will remain free from falls  Outcome: Ongoing  Goal: Absence of physical injury  Description: Absence of physical injury  Outcome: Ongoing     Problem: Mood - Altered:  Goal: Mood stable  Description: Mood stable  Outcome: Ongoing  Goal: Absence of abusive behavior  Description: Absence of abusive behavior  Outcome: Ongoing  Goal: Verbalizations of feeling emotionally comfortable while being cared for will increase  Description: Verbalizations of feeling emotionally comfortable while being cared for will increase  Outcome: Ongoing     Problem: Psychomotor Activity - Altered:  Goal: Absence of psychomotor disturbance signs and symptoms  Description: Absence of psychomotor disturbance signs and symptoms  Outcome: Ongoing     Problem: Sensory Perception - Impaired:  Goal: Demonstrations of improved sensory functioning will increase  Description: Demonstrations of improved sensory functioning will increase  Outcome: Ongoing  Goal: Decrease in sensory misperception frequency  Description: Decrease in sensory misperception frequency  Outcome: Ongoing  Goal: Able to refrain from responding to false sensory perceptions  Description: Able to refrain from responding to false sensory perceptions  Outcome: Ongoing  Goal: Demonstrates accurate environmental perceptions  Description: Demonstrates accurate environmental perceptions  Outcome: Ongoing  Goal: Able to distinguish between reality-based and nonreality-based thinking  Description: Able to distinguish between reality-based and nonreality-based thinking  Outcome: Ongoing  Goal: Able to interrupt nonreality-based thinking  Description: Able to interrupt nonreality-based thinking  Outcome: Ongoing     Problem: Sleep Pattern Disturbance:  Goal: Appears well-rested  Description: Appears well-rested  Outcome: Ongoing     Problem: Infection:  Goal: Will remain free from infection  Description: Will remain free from infection  Outcome: Ongoing     Problem: Safety:  Goal: Free from accidental physical injury  Description: Free from accidental physical injury  Outcome: Ongoing  Goal: Free from intentional harm  Description: Free from intentional harm  Outcome: Ongoing  Goal: Ability to appropriately use an adaptive device for ambulation will improve  Description: Ability to appropriately use an adaptive device for ambulation will improve  Outcome: Ongoing  Goal: Ability to safely and independently change position in bed will improve  Description: Ability to safely and independently change position in bed will improve  Outcome: Ongoing  Goal: Ability to safely transfer will improve  Description: Ability to safely transfer will improve  Outcome: Ongoing     Problem: Daily Care:  Goal: Daily care needs are met  Description: Daily care needs are met  Outcome: Ongoing     Problem: Pain:  Goal: Patient's pain/discomfort is manageable  Description: Patient's pain/discomfort is manageable  Outcome: Ongoing     Problem: Skin Integrity:  Goal: Skin integrity will stabilize  Description: Skin integrity will stabilize  Outcome: Ongoing     Problem: Discharge Planning:  Goal: Patients continuum of care needs are met  Description: Patients continuum of care needs are met  Outcome: Ongoing     Problem: Health Behavior:  Goal: Compliance with treatment plan for underlying cause of condition will improve  Description: Compliance with treatment plan for underlying cause of condition will improve  Outcome: Ongoing  Goal: Identification of resources available to assist in meeting health care needs will improve  Description: Identification of resources available to assist in meeting health care needs will improve  Outcome: Ongoing  Goal: Ability to identify changes in lifestyle to reduce recurrence of condition will improve  Description: Ability to identify changes in lifestyle to reduce recurrence of condition will improve  Outcome: Ongoing  Goal: Ability to manage health-related needs will improve  Description: Ability to manage health-related needs will improve  Outcome: Ongoing     Problem: Fluid Volume:  Goal: Maintenance of adequate hydration will improve  Description: Maintenance of adequate hydration will improve  Outcome: Ongoing     Problem: improve  Description: Diagnostic test results will improve  Outcome: Ongoing  Goal: Ability to maintain vital signs within normal range will improve  Description: Ability to maintain vital signs within normal range will improve  Outcome: Ongoing  Goal: Complications related to the disease process, condition or treatment will be avoided or minimized  Description: Complications related to the disease process, condition or treatment will be avoided or minimized  Outcome: Ongoing  Goal: Ability to avoid complications of mobility impairment will improve  Outcome: Ongoing     Problem: Respiratory:  Goal: Ability to maintain normal respiratory secretions will improve  Description: Ability to maintain normal respiratory secretions will improve  Outcome: Ongoing  Goal: Ability to maintain adequate ventilation will improve  Description: Ability to maintain adequate ventilation will improve  Outcome: Ongoing  Goal: Levels of oxygenation will improve  Outcome: Ongoing     Problem: Cardiac:  Goal: Diagnostic test results will improve  Description: Diagnostic test results will improve  Outcome: Ongoing  Goal: Complications related to the disease process, condition or treatment will be avoided or minimized  Description: Complications related to the disease process, condition or treatment will be avoided or minimized  Outcome: Ongoing

## 2021-11-21 NOTE — PROGRESS NOTES
Concern    Not on file   Social History Narrative    Not on file     Social Determinants of Health     Financial Resource Strain:     Difficulty of Paying Living Expenses: Not on file   Food Insecurity:     Worried About Running Out of Food in the Last Year: Not on file    Анна of Food in the Last Year: Not on file   Transportation Needs:     Lack of Transportation (Medical): Not on file    Lack of Transportation (Non-Medical):  Not on file   Physical Activity:     Days of Exercise per Week: Not on file    Minutes of Exercise per Session: Not on file   Stress:     Feeling of Stress : Not on file   Social Connections:     Frequency of Communication with Friends and Family: Not on file    Frequency of Social Gatherings with Friends and Family: Not on file    Attends Nondenominational Services: Not on file    Active Member of Clubs or Organizations: Not on file    Attends Club or Organization Meetings: Not on file    Marital Status: Not on file   Intimate Partner Violence:     Fear of Current or Ex-Partner: Not on file    Emotionally Abused: Not on file    Physically Abused: Not on file    Sexually Abused: Not on file   Housing Stability:     Unable to Pay for Housing in the Last Year: Not on file    Number of [place] in the Last Year: Not on file    Unstable Housing in the Last Year: Not on file       Medications:   sodium chloride        docusate sodium  100 mg Oral Daily    atorvastatin  20 mg Oral Daily    aspirin  81 mg Oral Daily    amLODIPine  5 mg Oral Daily    levothyroxine  100 mcg Oral Daily    losartan  25 mg Oral Daily    sodium chloride flush  5-40 mL IntraVENous 2 times per day    enoxaparin  30 mg SubCUTAneous Q24H    famotidine (PEPCID) injection  20 mg IntraVENous Daily       Diagnostic Studies:  Reviewed all labs/diagnositcs since last 24hrs:  Recent Results (from the past 24 hour(s))   Comprehensive Metabolic Panel w/ Reflex to MG    Collection Time: 11/21/21  1:58 AM intra-axial or extra-axial hemorrhage. No   visualized mass lesion or mass effect. Ventricles are nondilated. The   posterior fossa structures are unremarkable. The pituitary gland and   sella are unremarkable. The major intracranial flow voids are   preserved. The orbits are unremarkable. The paranasal sinuses and mastoids are   clear. Marrow signal in the calvarium and skull base appears normal.       Impression   Impression:    1. No acute intracranial process. In particular, no acute ischemia. 2. Underlying chronic small vessel ischemic change. Signed by Dr Duc Whalen     EEG - very slow    Diet:  ADULT ORAL NUTRITION SUPPLEMENT; Dinner; Standard High Calorie/High Protein Oral Supplement  ADULT DIET; Easy to Chew; Low Fat/Low Chol/High Fiber/YOLY    Examination:  Vitals: BP (!) 145/77   Pulse 77   Temp 96.8 °F (36 °C)   Resp 16   Ht 5' 2\" (1.575 m)   Wt 143 lb 1 oz (64.9 kg)   SpO2 95%   BMI 26.17 kg/m²      Intake/Output Summary (Last 24 hours) at 11/21/2021 1440  Last data filed at 11/21/2021 1344  Gross per 24 hour   Intake 120 ml   Output 200 ml   Net -80 ml     General appearance:  She is an elderly woman who is noted to have some psychomotor slowing. Skin: Skin color, texture, turgor normal. No rashes or lesions  HEENT:  Atraumatic, normocephalic. No nuchal rigidity. Neck:no adenopathy, no carotid bruit, no JVD, supple, symmetrical, trachea midline and thyroid not enlarged, symmetric, no tenderness/mass/nodules  Lungs: clear to auscultation bilaterally  Heart: regular rate and rhythm, S1, S2 normal, no murmur, click, rub or gallop  Abdomen: soft, non-tender; bowel sounds normal; no masses,  no organomegaly  Extremities: extremities normal, atraumatic, no cyanosis or edema  Neurologic:  Alert, oriented to [facility] [place] and 11/2021. No dysarthria. Speech is fluent. EOMI, PERRL, VFF. No facial weakness. Limb strength is diffusely weak with definite asterixis.   Bilatera pronator drift. Rapid alternating movements are normal.  Finger to nose testing shows no dysmetria. Assessment:   1. Encephalopathy with asterixis and visual hallucinations. Clinically this looks like a metabolic encephalopathy but there are no significant metabolic derangements. It is some better today. 2.         Hypertension  3. Hyperlipidemia  4. CAD    Plan:   1. Hold Lovenox, consider LP although low yield  2. PT/OT       Discharge planning:   SNF for rehab    Reviewed treatment plans with the patient and/or family. 35 minutes spent in face to face interaction and coordination of care.      Electronically signed by Saad Mcneill MD on 11/21/2021 at 2:40 PM

## 2021-11-22 LAB
ALBUMIN SERPL-MCNC: 3.4 G/DL (ref 3.5–5.2)
ALP BLD-CCNC: 73 U/L (ref 35–104)
ALT SERPL-CCNC: 9 U/L (ref 5–33)
ANION GAP SERPL CALCULATED.3IONS-SCNC: 10 MMOL/L (ref 7–19)
APTT: 38.3 SEC (ref 26–36.2)
AST SERPL-CCNC: 16 U/L (ref 5–32)
BASOPHILS ABSOLUTE: 0.1 K/UL (ref 0–0.2)
BASOPHILS RELATIVE PERCENT: 0.8 % (ref 0–1)
BILIRUB SERPL-MCNC: 0.4 MG/DL (ref 0.2–1.2)
BLOOD CULTURE, ROUTINE: NORMAL
BLOOD CULTURE, ROUTINE: NORMAL
BUN BLDV-MCNC: 33 MG/DL (ref 8–23)
CALCIUM SERPL-MCNC: 10.1 MG/DL (ref 8.8–10.2)
CHLORIDE BLD-SCNC: 102 MMOL/L (ref 98–111)
CO2: 24 MMOL/L (ref 22–29)
CREAT SERPL-MCNC: 1.4 MG/DL (ref 0.5–0.9)
CULTURE, BLOOD 2: NORMAL
EOSINOPHILS ABSOLUTE: 0.2 K/UL (ref 0–0.6)
EOSINOPHILS RELATIVE PERCENT: 3.4 % (ref 0–5)
GFR AFRICAN AMERICAN: 44
GFR NON-AFRICAN AMERICAN: 36
GLUCOSE BLD-MCNC: 95 MG/DL (ref 74–109)
HCT VFR BLD CALC: 38.6 % (ref 37–47)
HEMOGLOBIN: 11.8 G/DL (ref 12–16)
IMMATURE GRANULOCYTES #: 0 K/UL
INR BLD: 0.97 (ref 0.88–1.18)
IRON SATURATION: 97 % (ref 14–50)
IRON: 172 UG/DL (ref 37–145)
LYMPHOCYTES ABSOLUTE: 2 K/UL (ref 1.1–4.5)
LYMPHOCYTES RELATIVE PERCENT: 31.5 % (ref 20–40)
MCH RBC QN AUTO: 26.9 PG (ref 27–31)
MCHC RBC AUTO-ENTMCNC: 30.6 G/DL (ref 33–37)
MCV RBC AUTO: 88.1 FL (ref 81–99)
MONOCYTES ABSOLUTE: 0.6 K/UL (ref 0–0.9)
MONOCYTES RELATIVE PERCENT: 9.2 % (ref 0–10)
NEUTROPHILS ABSOLUTE: 3.5 K/UL (ref 1.5–7.5)
NEUTROPHILS RELATIVE PERCENT: 54.9 % (ref 50–65)
PDW BLD-RTO: 15.5 % (ref 11.5–14.5)
PLATELET # BLD: 199 K/UL (ref 130–400)
PMV BLD AUTO: 10.5 FL (ref 9.4–12.3)
POTASSIUM REFLEX MAGNESIUM: 3.9 MMOL/L (ref 3.5–5)
PROTHROMBIN TIME: 13.1 SEC (ref 12–14.6)
RBC # BLD: 4.38 M/UL (ref 4.2–5.4)
SODIUM BLD-SCNC: 136 MMOL/L (ref 136–145)
TOTAL IRON BINDING CAPACITY: 177 UG/DL (ref 250–400)
TOTAL PROTEIN: 5.7 G/DL (ref 6.6–8.7)
WBC # BLD: 6.4 K/UL (ref 4.8–10.8)

## 2021-11-22 PROCEDURE — 6370000000 HC RX 637 (ALT 250 FOR IP): Performed by: HOSPITALIST

## 2021-11-22 PROCEDURE — 83550 IRON BINDING TEST: CPT

## 2021-11-22 PROCEDURE — 97116 GAIT TRAINING THERAPY: CPT

## 2021-11-22 PROCEDURE — 36415 COLL VENOUS BLD VENIPUNCTURE: CPT

## 2021-11-22 PROCEDURE — 6370000000 HC RX 637 (ALT 250 FOR IP): Performed by: NURSE PRACTITIONER

## 2021-11-22 PROCEDURE — 2580000003 HC RX 258: Performed by: NURSE PRACTITIONER

## 2021-11-22 PROCEDURE — 83540 ASSAY OF IRON: CPT

## 2021-11-22 PROCEDURE — 85025 COMPLETE CBC W/AUTO DIFF WBC: CPT

## 2021-11-22 PROCEDURE — 99232 SBSQ HOSP IP/OBS MODERATE 35: CPT | Performed by: PSYCHIATRY & NEUROLOGY

## 2021-11-22 PROCEDURE — 6370000000 HC RX 637 (ALT 250 FOR IP): Performed by: INTERNAL MEDICINE

## 2021-11-22 PROCEDURE — 80053 COMPREHEN METABOLIC PANEL: CPT

## 2021-11-22 PROCEDURE — 1210000000 HC MED SURG R&B

## 2021-11-22 PROCEDURE — 85730 THROMBOPLASTIN TIME PARTIAL: CPT

## 2021-11-22 PROCEDURE — 2500000003 HC RX 250 WO HCPCS: Performed by: NURSE PRACTITIONER

## 2021-11-22 PROCEDURE — 97530 THERAPEUTIC ACTIVITIES: CPT

## 2021-11-22 PROCEDURE — 85610 PROTHROMBIN TIME: CPT

## 2021-11-22 PROCEDURE — 97162 PT EVAL MOD COMPLEX 30 MIN: CPT

## 2021-11-22 RX ORDER — LACTULOSE 10 G/15ML
30 SOLUTION ORAL 2 TIMES DAILY
Status: DISCONTINUED | OUTPATIENT
Start: 2021-11-22 | End: 2021-11-24 | Stop reason: HOSPADM

## 2021-11-22 RX ADMIN — AMLODIPINE BESYLATE 5 MG: 5 TABLET ORAL at 10:08

## 2021-11-22 RX ADMIN — SODIUM CHLORIDE, PRESERVATIVE FREE 10 ML: 5 INJECTION INTRAVENOUS at 20:46

## 2021-11-22 RX ADMIN — SODIUM CHLORIDE, PRESERVATIVE FREE 10 ML: 5 INJECTION INTRAVENOUS at 10:21

## 2021-11-22 RX ADMIN — LEVOTHYROXINE SODIUM 100 MCG: 50 TABLET ORAL at 05:14

## 2021-11-22 RX ADMIN — ASPIRIN 81 MG: 81 TABLET, COATED ORAL at 10:08

## 2021-11-22 RX ADMIN — FAMOTIDINE 20 MG: 10 INJECTION, SOLUTION INTRAVENOUS at 10:08

## 2021-11-22 RX ADMIN — LOSARTAN POTASSIUM 25 MG: 50 TABLET, FILM COATED ORAL at 10:08

## 2021-11-22 RX ADMIN — ATORVASTATIN CALCIUM 20 MG: 20 TABLET, FILM COATED ORAL at 10:08

## 2021-11-22 RX ADMIN — DOCUSATE SODIUM 100 MG: 100 CAPSULE, LIQUID FILLED ORAL at 10:08

## 2021-11-22 RX ADMIN — LACTULOSE 30 G: 20 SOLUTION ORAL at 20:46

## 2021-11-22 ASSESSMENT — PAIN DESCRIPTION - DESCRIPTORS
DESCRIPTORS: ACHING;DISCOMFORT
DESCRIPTORS: ACHING;DISCOMFORT

## 2021-11-22 ASSESSMENT — PAIN DESCRIPTION - PAIN TYPE
TYPE: CHRONIC PAIN
TYPE: CHRONIC PAIN

## 2021-11-22 ASSESSMENT — PAIN DESCRIPTION - ONSET: ONSET: ON-GOING

## 2021-11-22 ASSESSMENT — PAIN DESCRIPTION - LOCATION
LOCATION: BACK
LOCATION: BACK

## 2021-11-22 ASSESSMENT — PAIN DESCRIPTION - FREQUENCY
FREQUENCY: INTERMITTENT
FREQUENCY: INTERMITTENT

## 2021-11-22 ASSESSMENT — PAIN DESCRIPTION - PROGRESSION: CLINICAL_PROGRESSION: NOT CHANGED

## 2021-11-22 ASSESSMENT — PAIN DESCRIPTION - ORIENTATION: ORIENTATION: LOWER

## 2021-11-22 ASSESSMENT — PAIN SCALES - GENERAL
PAINLEVEL_OUTOF10: 5
PAINLEVEL_OUTOF10: 5

## 2021-11-22 ASSESSMENT — PAIN - FUNCTIONAL ASSESSMENT: PAIN_FUNCTIONAL_ASSESSMENT: PREVENTS OR INTERFERES SOME ACTIVE ACTIVITIES AND ADLS

## 2021-11-22 NOTE — PROGRESS NOTES
Health     Financial Resource Strain:     Difficulty of Paying Living Expenses: Not on file   Food Insecurity:     Worried About Running Out of Food in the Last Year: Not on file    Анна of Food in the Last Year: Not on file   Transportation Needs:     Lack of Transportation (Medical): Not on file    Lack of Transportation (Non-Medical):  Not on file   Physical Activity:     Days of Exercise per Week: Not on file    Minutes of Exercise per Session: Not on file   Stress:     Feeling of Stress : Not on file   Social Connections:     Frequency of Communication with Friends and Family: Not on file    Frequency of Social Gatherings with Friends and Family: Not on file    Attends Hoahaoism Services: Not on file    Active Member of Clubs or Organizations: Not on file    Attends Club or Organization Meetings: Not on file    Marital Status: Not on file   Intimate Partner Violence:     Fear of Current or Ex-Partner: Not on file    Emotionally Abused: Not on file    Physically Abused: Not on file    Sexually Abused: Not on file   Housing Stability:     Unable to Pay for Housing in the Last Year: Not on file    Number of [place] in the Last Year: Not on file    Unstable Housing in the Last Year: Not on file       Medications:   sodium chloride        docusate sodium  100 mg Oral Daily    atorvastatin  20 mg Oral Daily    aspirin  81 mg Oral Daily    amLODIPine  5 mg Oral Daily    levothyroxine  100 mcg Oral Daily    losartan  25 mg Oral Daily    sodium chloride flush  5-40 mL IntraVENous 2 times per day    [Held by provider] enoxaparin  30 mg SubCUTAneous Q24H    famotidine (PEPCID) injection  20 mg IntraVENous Daily       Diagnostic Studies:  Reviewed all labs/diagnositcs since last 24hrs:  Recent Results (from the past 24 hour(s))   Comprehensive Metabolic Panel w/ Reflex to MG    Collection Time: 11/22/21  3:44 AM   Result Value Ref Range    Sodium 136 136 - 145 mmol/L    Potassium reflex Magnesium 3.9 3.5 - 5.0 mmol/L    Chloride 102 98 - 111 mmol/L    CO2 24 22 - 29 mmol/L    Anion Gap 10 7 - 19 mmol/L    Glucose 95 74 - 109 mg/dL    BUN 33 (H) 8 - 23 mg/dL    CREATININE 1.4 (H) 0.5 - 0.9 mg/dL    GFR Non- 36 (A) >60    GFR  44 (L) >59    Calcium 10.1 8.8 - 10.2 mg/dL    Total Protein 5.7 (L) 6.6 - 8.7 g/dL    Albumin 3.4 (L) 3.5 - 5.2 g/dL    Total Bilirubin 0.4 0.2 - 1.2 mg/dL    Alkaline Phosphatase 73 35 - 104 U/L    ALT 9 5 - 33 U/L    AST 16 5 - 32 U/L   CBC Auto Differential    Collection Time: 11/22/21  3:44 AM   Result Value Ref Range    WBC 6.4 4.8 - 10.8 K/uL    RBC 4.38 4.20 - 5.40 M/uL    Hemoglobin 11.8 (L) 12.0 - 16.0 g/dL    Hematocrit 38.6 37.0 - 47.0 %    MCV 88.1 81.0 - 99.0 fL    MCH 26.9 (L) 27.0 - 31.0 pg    MCHC 30.6 (L) 33.0 - 37.0 g/dL    RDW 15.5 (H) 11.5 - 14.5 %    Platelets 717 753 - 293 K/uL    MPV 10.5 9.4 - 12.3 fL    Neutrophils % 54.9 50.0 - 65.0 %    Lymphocytes % 31.5 20.0 - 40.0 %    Monocytes % 9.2 0.0 - 10.0 %    Eosinophils % 3.4 0.0 - 5.0 %    Basophils % 0.8 0.0 - 1.0 %    Neutrophils Absolute 3.5 1.5 - 7.5 K/uL    Immature Granulocytes # 0.0 K/uL    Lymphocytes Absolute 2.0 1.1 - 4.5 K/uL    Monocytes Absolute 0.60 0.00 - 0.90 K/uL    Eosinophils Absolute 0.20 0.00 - 0.60 K/uL    Basophils Absolute 0.10 0.00 - 0.20 K/uL   Protime-INR    Collection Time: 11/22/21  3:44 AM   Result Value Ref Range    Protime 13.1 12.0 - 14.6 sec    INR 0.97 0.88 - 1.18   APTT    Collection Time: 11/22/21  3:44 AM   Result Value Ref Range    aPTT 38.3 (H) 26.0 - 36.2 sec   Iron and TIBC    Collection Time: 11/22/21  3:44 AM   Result Value Ref Range    Iron 172 (H) 37 - 145 ug/dL    TIBC 177 (L) 250 - 400 ug/dL    Iron Saturation 97 (H) 14 - 50 %       Diet:  ADULT ORAL NUTRITION SUPPLEMENT; Dinner; Standard High Calorie/High Protein Oral Supplement  ADULT DIET; Easy to Chew; Low Fat/Low Chol/High Fiber/YOLY    Examination:  Vitals: BP 135/68   Pulse 56   Temp 96.4 °F (35.8 °C) (Temporal)   Resp 12   Ht 5' 2\" (1.575 m)   Wt 143 lb 1 oz (64.9 kg)   SpO2 96%   BMI 26.17 kg/m²      Intake/Output Summary (Last 24 hours) at 11/22/2021 8208  Last data filed at 11/21/2021 2352  Gross per 24 hour   Intake 850 ml   Output 1050 ml   Net -200 ml     General appearance:  She is an elderly woman who is noted to have some psychomotor slowing. HEENT:  Atraumatic, normocephalic.  No nuchal rigidity. Neck:no adenopathy, no carotid bruit, no JVD, supple, symmetrical, trachea midline and thyroid not enlarged, symmetric, no tenderness/mass/nodules  Lungs: clear to auscultation bilaterally  Heart: regular rate and rhythm, S1, S2 normal, no murmur, click, rub or gallop  Abdomen: soft, non-tender; bowel sounds normal; no masses,  no organomegaly  Extremities: extremities normal, atraumatic, no cyanosis or edema  Neurologic:  Alert, oriented to [facility] [place] and 11/2021. No dysarthria. Speech is fluent. EOMI, PERRL, VFF. No facial weakness. Limb strength is diffusely weak with definite asterixis. Bilatera pronator drift. Rapid alternating movements are normal.  Finger to nose testing shows no dysmetria. Assessment:   1. Encephalopathy with asterixis and visual hallucinations. Clinically this looks like a metabolic encephalopathy but there are no significant metabolic derangements. It is some better today. 2.         Hypertension  3.         Hyperlipidemia  4.         CAD    Plan:   1. As she is getting better, will hold off on LP  2. PT/OT/ST       Discharge planning:   She is going to need SNF for rehab    Reviewed treatment plans with the patient and/or family. 25 minutes spent in face to face interaction and coordination of care.      Electronically signed by Edgar Kent MD on 11/22/2021 at 9:11 AM

## 2021-11-22 NOTE — CARE COORDINATION
Late entry for 11/19/21; Pt's daughter chose [facility].   Jenniffer Quiñonez [facility]   [phone] p  [phone] f  Electronically signed by Darien Halsted, LSW on 11/22/2021 at 10:47 AM

## 2021-11-22 NOTE — PROGRESS NOTES
[facility]      Progress Note    Patient:  Kendra Catalan  YOB: 1940  Date of Service: 11/21/2021  MRN: [id number]   Acct: [de-identified]   Primary Care Physician: El Edmond  Advance Directive: DNR-CC  Admit Date: 11/17/2021       Hospital Day: 5    Portions of this note have been copied forward, however, updated to reflect the most current clinical status of this patient. CHIEF COMPLAINT mental status    SUBJECTIVE: No improvement today    CUMULATIVE HOSPITAL COURSE: Patient is a 24-year-old was presented to the emergency room yesterday with altered mental status worsening over the past few weeks. Family had taken her to [facility] for altered mental status possible UTI. None was found at that time patient continued to decline so daughter brought her here. She was somnolent and would open eyes on command only. Today's not much different we did hold some drugs that might have come increased her altered mental status but is not thus far made any change. Did receive a call of 1 of 2 blood culture bottles positive from [facility].  I have not seen a hard copy of that yet we repeated them anyway. I felt like she was somewhat dehydrated she was on 80 of Lasix at home. Shelbyseda Lezama She was given a fluid bolus again today and repeat labs. Labs improved today but her mental status has not. She will answer simple questions. Daughter mentions today some jerking or spastic type movements of upper body. Neurology consulted. EEG and MRI pending. Venofer  added. Review of Systems   Reason unable to perform ROS: aLTERED. All other systems reviewed and are negative.        Objective:   VITALS:  BP (!) 141/74   Pulse 62   Temp 96.4 °F (35.8 °C)   Resp 16   Ht 5' 2\" (1.575 m)   Wt 143 lb 1 oz (64.9 kg)   SpO2 99%   BMI 26.17 kg/m²   24HR INTAKE/OUTPUT:      Intake/Output Summary (Last 24 hours) at 11/22/2021 1627  Last data filed at 11/22/2021 1009  Gross per 24 hour Intake 550 ml   Output 1050 ml   Net -500 ml           Physical Exam  Vitals and nursing note reviewed. Constitutional:       Appearance: She is ill-appearing. Comments: More alert  Answers questions. Sitting up in bed   HENT:      Head: Normocephalic. Mouth/Throat:      Mouth: Mucous membranes are moist.   Cardiovascular:      Rate and Rhythm: Normal rate and regular rhythm. Heart sounds: Murmur heard. Pulmonary:      Breath sounds: Normal breath sounds. Abdominal:      General: Abdomen is flat. Palpations: Abdomen is soft. Skin:     General: Skin is warm.       Comments: POOR TURGOR   Neurological:      Comments: More alert  smiles            Medications:      sodium chloride        docusate sodium  100 mg Oral Daily    atorvastatin  20 mg Oral Daily    aspirin  81 mg Oral Daily    amLODIPine  5 mg Oral Daily    levothyroxine  100 mcg Oral Daily    losartan  25 mg Oral Daily    sodium chloride flush  5-40 mL IntraVENous 2 times per day    [Held by provider] enoxaparin  30 mg SubCUTAneous Q24H    famotidine (PEPCID) injection  20 mg IntraVENous Daily     melatonin, sodium chloride, sodium chloride flush, ondansetron **OR** ondansetron, polyethylene glycol, acetaminophen **OR** acetaminophen  ADULT ORAL NUTRITION SUPPLEMENT; Dinner; Standard High Calorie/High Protein Oral Supplement  ADULT DIET; Easy to Chew; Low Fat/Low Chol/High Fiber/YOLY     Lab and other Data:     Recent Labs     11/20/21 0127 11/21/21 0158 11/22/21  0344   WBC 7.3 6.5 6.4   HGB 13.0 13.3 11.8*    220 199     Recent Labs     11/20/21 0127 11/21/21 0158 11/22/21  0344    145 136   K 3.6 3.7 3.9    106 102   CO2 21* 24 24   BUN 27* 26* 33*   CREATININE 1.5* 1.4* 1.4*   GLUCOSE 112* 110* 95     Recent Labs     11/20/21 0127 11/21/21  0158 11/22/21  0344   AST 15 16 16   ALT 10 10 9   BILITOT 0.5 0.5 0.4   ALKPHOS 74 79 73     Troponin T: No results for input(s): TROPONINI in the last 72 hours. Pro-BNP: No results for input(s): BNP in the last 72 hours. INR:   Recent Labs     11/22/21  0344   INR 0.97     UA:  No results for input(s): NITRITE, COLORU, PHUR, LABCAST, WBCUA, RBCUA, MUCUS, TRICHOMONAS, YEAST, BACTERIA, CLARITYU, SPECGRAV, LEUKOCYTESUR, UROBILINOGEN, BILIRUBINUR, BLOODU, GLUCOSEU, AMORPHOUS in the last 72 hours. Invalid input(s): Alisha Albarado  A1C: No results for input(s): LABA1C in the last 72 hours. ABG:No results for input(s): PHART, TQV1DON, PO2ART, WQG3CGJ, BEART, HGBAE, Z2YGIDRW, CARBOXHGBART in the last 72 hours. RAD:   XR CHEST (2 VW)    Result Date: 11/17/2021  XR CHEST (2 VW) 11/17/2021 11:28 AM HISTORY: Altered mental status, positive blood cultures COMPARISON: None. FINDINGS: No lung consolidation. No pleural effusion or pneumothorax. Prior coronary bypass. Heart size is normal. Pulmonary vasculature are nondilated. Slight right hilar prominence appears to be hilar vascular structures. No acute bony abnormality seen. 1. No consolidation/acute infiltrate identified. Prior coronary bypass. Signed by Dr Napoleon Patterson            Assessment/Plan   Principal Problem:    Altered mental status   Serial lab   Void sedating medicine   DC antibiotics-    Fall precautions   Vital signs and fever every 4 hours   PT OT speech eval   Neurology consult  Active Problems:    Secondary hypertension   Continue home meds    Hold lasix-BUN27, creat 1.5    Coronary artery disease involving native coronary artery of native heart without angina pectoris   No c/o chest pain or dyspnea    Hyperlipidemia    Palliative care patient  Resolved Problems:    * No resolved hospital problems. *        DVT Prophylaxis:  Lovenox    Discharge planning: Most likely SNF placement      Further Orders per Clinical course/attending.      Electronically signed by GIAN Feldman CNP on 11/22/2021 at 4:27 PM       EMR Dragon/Transcription disclaimer:   Much of this encounter note is an electronic transcription/translation of spoken language to printed text.  The electronic translation of spoken language may permit erroneous, or at times, nonsensical words or phrases to be inadvertently transcribed; although attempts have made to review the note for such errors, some may still exist.

## 2021-11-22 NOTE — PROGRESS NOTES
Physical Therapy    Facility/Department: [facility] 3 SHAAN/VAS/MED  Initial Assessment    NAME: Vincenzo Quiñones  : 1940  MRN: [id number]    Date of Service: 2021    Discharge Recommendations:  Continue to assess pending progress, 24 hour supervision or assist, Patient would benefit from continued therapy after discharge, Home with Home health PT        Assessment   Body structures, Functions, Activity limitations: Decreased functional mobility ; Decreased ROM; Decreased strength; Decreased safe awareness; Decreased balance; Decreased cognition; Decreased endurance; Decreased coordination; Decreased posture; Increased pain  Assessment: Pt. will benefit from cont. PT to decrease impairments. Pt. a fall risk and should not attempt mobility on her own at this time. Pt. with strong posterior loss of balance and unable to come fully upright with weight distributed evenly on feet. Pt. needs to use RW vs rollator at this time. Would need 24 hr A at this time. Treatment Diagnosis: impaired gait and mobility  Prognosis: Good  Decision Making: Medium Complexity  PT Education: Goals; PT Role; Plan of Care; Transfer Training; Precautions; General Safety; Gait Training; Functional Mobility Training; Adaptive Device Training; Weight-bearing Education  Barriers to Learning: cognition, [place]  REQUIRES PT FOLLOW UP: Yes  Activity Tolerance  Activity Tolerance: Patient Tolerated treatment well       Patient Diagnosis(es): The primary encounter diagnosis was Positive blood culture. Diagnoses of Delirium superimposed on dementia and Renal insufficiency were also pertinent to this visit. has a past medical history of Arthritis, Bladder infection, CAD (coronary artery disease), Cardiovascular disease, Chronic kidney disease, Colon polyps, Constipation, Gallbladder disease, GERD (gastroesophageal reflux disease), Gout, Heart attack ([facility] [address] Heart disease, Hyperlipidemia, Hypertension, Incontinence, and Thyroid disease.    has a past surgical history that includes Cholecystectomy; Hysterectomy; and Coronary artery bypass graft. Restrictions  Restrictions/Precautions  Restrictions/Precautions: Fall Risk  Required Braces or Orthoses?: No  Vision/Hearing  Vision: Within Functional Limits  Hearing: Exceptions to [facility]  Hearing Exceptions: Hard of hearing/hearing concerns; Bilateral hearing aid (hearing aids not at hospital)     Subjective  General  Chart Reviewed: Yes  Patient assessed for rehabilitation services?: Yes  Response To Previous Treatment: Not applicable  Family / Caregiver Present: Yes (dtr)  Referring Practitioner: Urmila Lomas MD  Referral Date : 11/20/21  Diagnosis: positive blood cultures, delerium, renal insufficiency  Follows Commands: Impaired  Other (Comment): needs repetition, [place]  Subjective  Subjective: Pt. willing to get up. Wants to go home. Pain Screening  Patient Currently in Pain: Yes  Pain Assessment  Pain Assessment: 0-10  Pain Level: 5  Pain Type: Chronic pain  Pain Location: Back  Pain Orientation: Lower  Pain Descriptors: Aching; Discomfort  Pain Frequency: Intermittent  Response to Pain Intervention: Patient Satisfied  Vital Signs  BP: (!) 141/74  Patient Currently in Pain: Yes  Pre Treatment Pain Screening  Intervention List: Patient able to continue with treatment  Comments / Details: pain decreased with out of bed activity    Orientation  Orientation  Overall Orientation Status: Within Functional Limits  Social/Functional History  Social/Functional History  Lives With: Daughter  Type of Home: House  Home Layout: One level  Home Equipment: 4 wheeled walker  ADL Assistance: Independent  Ambulation Assistance: Independent  Transfer Assistance: Independent  Additional Comments: Daughter reports pt was I with self care, but daughter reports she A pt with washing her hair  Cognition   Cognition  Overall Cognitive Status: Exceptions  Following Commands: Follows one step commands with increased time;  Follows one step commands with repetition  Attention Span: Attends with cues to redirect  Memory: Decreased recall of recent events; Decreased short term memory  Safety Judgement: Decreased awareness of need for assistance; Decreased awareness of need for safety  Problem Solving: Assistance required to implement solutions; Assistance required to generate solutions  Insights: Decreased awareness of deficits  Initiation: Requires cues for some  Sequencing: Requires cues for some    Objective     Observation/Palpation  Posture: Poor  Observation: purewick  Body Mechanics: posterior lean with weight on B heels    AROM RLE (degrees)  RLE AROM: WFL  AROM LLE (degrees)  LLE AROM : WFL  Strength RLE  Strength RLE: Exception  Comment: grossly 3-/5 hips, 3+/5 knees and ankles  Strength LLE  Strength LLE: Exception  Comment: grossly 3-/5 hips, 3+/5 knees and ankles        Bed mobility  Supine to Sit: Moderate assistance  Sit to Supine: Unable to assess  Comment: posterior lean in sitting  Transfers  Sit to Stand: Moderate Assistance; 2 Person Assistance  Stand to sit: Moderate Assistance; 2 Person Assistance  Bed to Chair: Moderate assistance; 2 Person Assistance (RW)  Ambulation  Ambulation?: Yes  WB Status: no restriction  Ambulation 1  Surface: level tile  Device: Rolling Walker  Assistance:  Moderate assistance; 2 Person assistance  Quality of Gait: unsteady, posterior lean, unable to get hips underneath shoulders  Gait Deviations: Slow Marcelle; Decreased step length; Decreased step height  Distance: 3'     Balance  Posture: Poor  Sitting - Static: Fair; +  Sitting - Dynamic: Fair; -  Standing - Static: Poor; +  Standing - Dynamic: Poor  Exercises  Hip Flexion: x10  Knee Long Arc Quad: x10  Ankle Pumps: x10     Plan   Plan  Times per week: 3-7  Times per day: Daily  Plan weeks: 2  Current Treatment Recommendations: Strengthening, Balance Training, ROM, Functional Mobility Training, Transfer Training, Endurance Training, Gait

## 2021-11-22 NOTE — PROGRESS NOTES
[facility]      Progress Note    Patient:  Guillermo Jeter  YOB: 1940  Date of Service: 11/21/2021  MRN: [id number]   Acct: [de-identified]   Primary Care Physician: Jacquelin Alvarado  Advance Directive: DNR-CC  Admit Date: 11/17/2021       Hospital Day: 5    Portions of this note have been copied forward, however, updated to reflect the most current clinical status of this patient. CHIEF COMPLAINT mental status    SUBJECTIVE: Sitting up in chair feeding     Otoniel 10: Patient is a 43-year-old was presented to the emergency room yesterday with altered mental status worsening over the past few weeks. Family had taken her to [facility] for altered mental status possible UTI. None was found at that time patient continued to decline so daughter brought her here. She was somnolent and would open eyes on command only. Today's not much different we did hold some drugs that might have come increased her altered mental status but is not thus far made any change. Did receive a call of 1 of 2 blood culture bottles positive from [facility].  I have not seen a hard copy of that yet we repeated them anyway. I felt like she was somewhat dehydrated she was on 80 of Lasix at home. Erlemario Pedrazaers She was given a fluid bolus again today and repeat labs. Labs improved today but her mental status has not. She will answer simple questions. Daughter mentions today some jerking or spastic type movements of upper body. Neurology consulted. EEG and MRI pending. Venofer  added. Review of Systems   Reason unable to perform ROS: aLTERED. All other systems reviewed and are negative.        Objective:   VITALS:  BP (!) 141/74   Pulse 62   Temp 96.4 °F (35.8 °C)   Resp 16   Ht 5' 2\" (1.575 m)   Wt 143 lb 1 oz (64.9 kg)   SpO2 99%   BMI 26.17 kg/m²   24HR INTAKE/OUTPUT:      Intake/Output Summary (Last 24 hours) at 11/22/2021 1631  Last data filed at 11/22/2021 1009  Gross per 24 hour   Intake 550 ml   Output 1050 ml   Net -500 ml           Physical Exam  Vitals and nursing note reviewed. Constitutional:       Appearance: She is ill-appearing. Comments: More alert  Answers questions. Sitting up in bed   HENT:      Head: Normocephalic. Mouth/Throat:      Mouth: Mucous membranes are moist.   Cardiovascular:      Rate and Rhythm: Normal rate and regular rhythm. Heart sounds: Murmur heard. Pulmonary:      Breath sounds: Normal breath sounds. Abdominal:      General: Abdomen is flat. Palpations: Abdomen is soft. Skin:     General: Skin is warm.       Comments: POOR TURGOR   Neurological:      Comments: More alert  smiles            Medications:      sodium chloride        docusate sodium  100 mg Oral Daily    atorvastatin  20 mg Oral Daily    aspirin  81 mg Oral Daily    amLODIPine  5 mg Oral Daily    levothyroxine  100 mcg Oral Daily    losartan  25 mg Oral Daily    sodium chloride flush  5-40 mL IntraVENous 2 times per day    [Held by provider] enoxaparin  30 mg SubCUTAneous Q24H    famotidine (PEPCID) injection  20 mg IntraVENous Daily     melatonin, sodium chloride, sodium chloride flush, ondansetron **OR** ondansetron, polyethylene glycol, acetaminophen **OR** acetaminophen  ADULT ORAL NUTRITION SUPPLEMENT; Dinner; Standard High Calorie/High Protein Oral Supplement  ADULT DIET; Easy to Chew; Low Fat/Low Chol/High Fiber/YOLY     Lab and other Data:     Recent Labs     11/20/21 0127 11/21/21 0158 11/22/21  0344   WBC 7.3 6.5 6.4   HGB 13.0 13.3 11.8*    220 199     Recent Labs     11/20/21 0127 11/21/21  0158 11/22/21  0344    145 136   K 3.6 3.7 3.9    106 102   CO2 21* 24 24   BUN 27* 26* 33*   CREATININE 1.5* 1.4* 1.4*   GLUCOSE 112* 110* 95     Recent Labs     11/20/21 0127 11/21/21  0158 11/22/21  0344   AST 15 16 16   ALT 10 10 9   BILITOT 0.5 0.5 0.4   ALKPHOS 74 79 73     Troponin T: No results for input(s): TROPONINI in the last 72 hours. Pro-BNP: No results for input(s): BNP in the last 72 hours. INR:   Recent Labs     11/22/21  0344   INR 0.97     UA:  No results for input(s): NITRITE, COLORU, PHUR, LABCAST, WBCUA, RBCUA, MUCUS, TRICHOMONAS, YEAST, BACTERIA, CLARITYU, SPECGRAV, LEUKOCYTESUR, UROBILINOGEN, BILIRUBINUR, BLOODU, GLUCOSEU, AMORPHOUS in the last 72 hours. Invalid input(s): Alisha Albarado  A1C: No results for input(s): LABA1C in the last 72 hours. ABG:No results for input(s): PHART, NTQ6BKY, PO2ART, EXG4UGH, BEART, HGBAE, C7RPYBUE, CARBOXHGBART in the last 72 hours. RAD:   XR CHEST (2 VW)    Result Date: 11/17/2021  XR CHEST (2 VW) 11/17/2021 11:28 AM HISTORY: Altered mental status, positive blood cultures COMPARISON: None. FINDINGS: No lung consolidation. No pleural effusion or pneumothorax. Prior coronary bypass. Heart size is normal. Pulmonary vasculature are nondilated. Slight right hilar prominence appears to be hilar vascular structures. No acute bony abnormality seen. 1. No consolidation/acute infiltrate identified. Prior coronary bypass. Signed by Dr Napoleon Patterson            Assessment/Plan   Principal Problem:    Altered mental status   Serial lab    stable   Avoid sedating medicine   DC antibiotics-    Fall precautions   Vital signs and fever every 4 hours   PT OT speech eval   Neurology consult  Active Problems:    Secondary hypertension   Continue home meds    Hold lasix-BUN33, creat 1.4    Coronary artery disease involving native coronary artery of native heart without angina pectoris   No c/o chest pain or dyspnea    Hyperlipidemia    Palliative care patient  Resolved Problems:    * No resolved hospital problems. *        DVT Prophylaxis:  Lovenox    Discharge planning: Most likely SNF placement      Further Orders per Clinical course/attending.      Electronically signed by GIAN Feldman CNP on 11/22/2021 at 4:31 PM       EMR Dragon/Transcription disclaimer:   Much of this encounter note is an electronic transcription/translation of spoken language to printed text.  The electronic translation of spoken language may permit erroneous, or at times, nonsensical words or phrases to be inadvertently transcribed; although attempts have made to review the note for such errors, some may still exist.

## 2021-11-22 NOTE — PROGRESS NOTES
Physician Progress Note      PATIENT:               Hermann Lepe  CSN #:                  [id number]  :                       1940  ADMIT DATE:       2021 11:10 AM  DISCH DATE:  RESPONDING  PROVIDER #:        Renetta Bella          QUERY TEXT:    Pt admitted with altered mental status. Noted documentation of encephalopathy   with asterixis and visual hallucinations looks like metabolic encephalopathy   by neurology  consultant. If possible, please document in progress notes and   discharge summary:    The medical record reflects the following:  Risk Factors: HTN, CAD,  Clinical Indicators: EEG - cortical dysfunction as seen with encephalopathy. Hallucination, visual disturbance. Medications being held has not helped. Positive blood cultures from [facility].  Treatment: EEG, labs, CT scan    Thank you    Chacorta Rizo RN, BSN, [facility]  [phone]  Options provided:  -- Metabolic encephalopathy confirmed present on admission  -- Metabolic encephalopathy ruled out  -- Defer to neurology consult consultant documentation regarding metabolic   encephalopathy  -- Other - I will add my own diagnosis  -- Disagree - Not applicable / Not valid  -- Disagree - Clinically unable to determine / Unknown  -- Refer to Clinical Documentation Reviewer    PROVIDER RESPONSE TEXT:    The diagnosis of metabolic encephalopathy was ruled out.     Query created by: Alicia Delgado on 2021 8:04 AM      Electronically signed by:  Renetta Bella 2021 9:47 AM

## 2021-11-22 NOTE — CARE COORDINATION
Updates faxed to [facility] as requested; will need covid swab within 48h of d/c  Electronically signed by ARVIND Arnold on 11/22/2021 at 2:01 PM

## 2021-11-22 NOTE — PLAN OF CARE
Problem: Falls - Risk of:  Goal: Will remain free from falls  Description: Will remain free from falls  11/21/2021 2330 by Peggy Taylor RN  Outcome: Ongoing  11/21/2021 1727 by Parth Torres RN  Outcome: Ongoing  Goal: Absence of physical injury  Description: Absence of physical injury  11/21/2021 2330 by Peggy Taylor RN  Outcome: Ongoing  11/21/2021 1727 by Parth Torres RN  Outcome: Ongoing     Problem: Skin Integrity:  Goal: Will show no infection signs and symptoms  Description: Will show no infection signs and symptoms  11/21/2021 2330 by Peggy Taylor RN  Outcome: Ongoing  11/21/2021 1727 by Parth Torres RN  Outcome: Ongoing  Goal: Absence of new skin breakdown  Description: Absence of new skin breakdown  11/21/2021 2330 by Peggy Taylor RN  Outcome: Ongoing  11/21/2021 1727 by Parth Torres RN  Outcome: Ongoing  Goal: Demonstration of wound healing without infection will improve  Description: Demonstration of wound healing without infection will improve  11/21/2021 2330 by Peggy Taylor RN  Outcome: Ongoing  11/21/2021 1727 by Parth Torres RN  Outcome: Ongoing  Goal: Complications related to intravenous access or infusion will be avoided or minimized  Description: Complications related to intravenous access or infusion will be avoided or minimized  11/21/2021 2330 by Peggy Taylor RN  Outcome: Ongoing  11/21/2021 1727 by Parth Torres RN  Outcome: Ongoing     Problem: Confusion - Acute:  Goal: Absence of continued neurological deterioration signs and symptoms  Description: Absence of continued neurological deterioration signs and symptoms  11/21/2021 2330 by Peggy Taylor RN  Outcome: Ongoing  11/21/2021 1727 by Parth Torres RN  Outcome: Ongoing  Goal: Mental status will be restored to baseline  Description: Mental status will be restored to baseline  11/21/2021 2330 by Peggy Taylor RN  Outcome: Ongoing  11/21/2021 1727 by Rodo Stevens RN  Outcome: Ongoing     Problem: Discharge Planning:  Goal: Ability to perform activities of daily living will improve  Description: Ability to perform activities of daily living will improve  11/21/2021 2330 by Shashi Pierre RN  Outcome: Ongoing  11/21/2021 1727 by Rodo Stevens RN  Outcome: Ongoing  Goal: Participates in care planning  Description: Participates in care planning  11/21/2021 2330 by Shashi Pierre RN  Outcome: Ongoing  11/21/2021 1727 by Rodo Stevens RN  Outcome: Ongoing  Goal: Discharged to appropriate level of care  Description: Discharged to appropriate level of care  11/21/2021 2330 by Shashi Pierre RN  Outcome: Ongoing  11/21/2021 1727 by Rodo Stevens RN  Outcome: Ongoing     Problem: Injury - Risk of, Physical Injury:  Goal: Will remain free from falls  Description: Will remain free from falls  11/21/2021 2330 by Shashi Pierre RN  Outcome: Ongoing  11/21/2021 1727 by Rodo Stevens RN  Outcome: Ongoing  Goal: Absence of physical injury  Description: Absence of physical injury  11/21/2021 2330 by Shashi Pierre RN  Outcome: Ongoing  11/21/2021 1727 by Rodo Stevens RN  Outcome: Ongoing     Problem: Mood - Altered:  Goal: Mood stable  Description: Mood stable  11/21/2021 2330 by Shashi Pierre RN  Outcome: Ongoing  11/21/2021 1727 by Rodo Stevens RN  Outcome: Ongoing  Goal: Absence of abusive behavior  Description: Absence of abusive behavior  11/21/2021 2330 by Shashi Pierre RN  Outcome: Ongoing  11/21/2021 1727 by Rodo Stevens RN  Outcome: Ongoing  Goal: Verbalizations of feeling emotionally comfortable while being cared for will increase  Description: Verbalizations of feeling emotionally comfortable while being cared for will increase  11/21/2021 2330 by Shashi Pierre RN  Outcome: Ongoing  11/21/2021 1727 by Rodo Stevens RN  Outcome: Ongoing     Problem: Psychomotor Activity - Altered:  Goal: Absence of psychomotor disturbance signs and symptoms  Description: Absence of psychomotor disturbance signs and symptoms  11/21/2021 2330 by Lauren Suazo RN  Outcome: Ongoing  11/21/2021 1727 by Evan Ortega RN  Outcome: Ongoing     Problem: Sensory Perception - Impaired:  Goal: Demonstrations of improved sensory functioning will increase  Description: Demonstrations of improved sensory functioning will increase  11/21/2021 2330 by Lauren Suazo RN  Outcome: Ongoing  11/21/2021 1727 by Evan Ortega RN  Outcome: Ongoing  Goal: Decrease in sensory misperception frequency  Description: Decrease in sensory misperception frequency  11/21/2021 2330 by Lauren Suazo RN  Outcome: Ongoing  11/21/2021 1727 by Evan Ortega RN  Outcome: Ongoing  Goal: Able to refrain from responding to false sensory perceptions  Description: Able to refrain from responding to false sensory perceptions  11/21/2021 2330 by Lauren Suazo RN  Outcome: Ongoing  11/21/2021 1727 by Evan rOtega RN  Outcome: Ongoing  Goal: Demonstrates accurate environmental perceptions  Description: Demonstrates accurate environmental perceptions  11/21/2021 2330 by Lauren Suazo RN  Outcome: Ongoing  11/21/2021 1727 by Evan Ortega RN  Outcome: Ongoing  Goal: Able to distinguish between reality-based and nonreality-based thinking  Description: Able to distinguish between reality-based and nonreality-based thinking  11/21/2021 2330 by Lauren Suazo RN  Outcome: Ongoing  11/21/2021 1727 by Evan Ortega RN  Outcome: Ongoing  Goal: Able to interrupt nonreality-based thinking  Description: Able to interrupt nonreality-based thinking  11/21/2021 2330 by Lauren Suazo RN  Outcome: Ongoing  11/21/2021 1727 by Evan Ortega RN  Outcome: Ongoing     Problem: Sleep Pattern Disturbance:  Goal: Appears well-rested  Description: Appears well-rested  11/21/2021 2330 by Lauren Suazo RN  Outcome: Ongoing  11/21/2021 1727 by Merline Muff, RN  Outcome: Ongoing     Problem: Infection:  Goal: Will remain free from infection  Description: Will remain free from infection  11/21/2021 2330 by Quentin Reza RN  Outcome: Ongoing  11/21/2021 1727 by Merline Muff, RN  Outcome: Ongoing     Problem: Safety:  Goal: Free from accidental physical injury  Description: Free from accidental physical injury  11/21/2021 2330 by Quentin Reza RN  Outcome: Ongoing  11/21/2021 1727 by Merline Muff, RN  Outcome: Ongoing  Goal: Free from intentional harm  Description: Free from intentional harm  11/21/2021 2330 by Quentin Reza RN  Outcome: Ongoing  11/21/2021 1727 by Merline Muff, RN  Outcome: Ongoing  Goal: Ability to appropriately use an adaptive device for ambulation will improve  Description: Ability to appropriately use an adaptive device for ambulation will improve  11/21/2021 2330 by Quentin Reza RN  Outcome: Ongoing  11/21/2021 1727 by Merline Muff, RN  Outcome: Ongoing  Goal: Ability to safely and independently change position in bed will improve  Description: Ability to safely and independently change position in bed will improve  11/21/2021 2330 by Quentin Reza RN  Outcome: Ongoing  11/21/2021 1727 by Merline Muff, RN  Outcome: Ongoing  Goal: Ability to safely transfer will improve  Description: Ability to safely transfer will improve  11/21/2021 2330 by Quentin Reza RN  Outcome: Ongoing  11/21/2021 1727 by Merline Muff, RN  Outcome: Ongoing     Problem: Daily Care:  Goal: Daily care needs are met  Description: Daily care needs are met  11/21/2021 2330 by Quentin Reza RN  Outcome: Ongoing  11/21/2021 1727 by Merline Muff, RN  Outcome: Ongoing     Problem: Pain:  Goal: Patient's pain/discomfort is manageable  Description: Patient's pain/discomfort is manageable  11/21/2021 2330 by Quentin Reza RN  Outcome: Ongoing  11/21/2021 1727 by Nohemi Lilly RN  Outcome: Ongoing     Problem: Skin Integrity:  Goal: Skin integrity will stabilize  Description: Skin integrity will stabilize  11/21/2021 2330 by Braulio Machado RN  Outcome: Ongoing  11/21/2021 1727 by Nohemi Lilly RN  Outcome: Ongoing     Problem: Discharge Planning:  Goal: Patients continuum of care needs are met  Description: Patients continuum of care needs are met  11/21/2021 2330 by Braulio Machado RN  Outcome: Ongoing  11/21/2021 1727 by Nohemi Lilly RN  Outcome: Ongoing     Problem: Health Behavior:  Goal: Compliance with treatment plan for underlying cause of condition will improve  Description: Compliance with treatment plan for underlying cause of condition will improve  11/21/2021 2330 by Braulio Machado RN  Outcome: Ongoing  11/21/2021 1727 by Nohemi Lilly RN  Outcome: Ongoing  Goal: Identification of resources available to assist in meeting health care needs will improve  Description: Identification of resources available to assist in meeting health care needs will improve  11/21/2021 2330 by Braulio Machado RN  Outcome: Ongoing  11/21/2021 1727 by Nohemi Lilly RN  Outcome: Ongoing  Goal: Ability to identify changes in lifestyle to reduce recurrence of condition will improve  Description: Ability to identify changes in lifestyle to reduce recurrence of condition will improve  11/21/2021 2330 by Braulio Machado RN  Outcome: Ongoing  11/21/2021 1727 by Nohemi Lilly RN  Outcome: Ongoing  Goal: Ability to manage health-related needs will improve  Description: Ability to manage health-related needs will improve  11/21/2021 2330 by Braulio Machado RN  Outcome: Ongoing  11/21/2021 1727 by Nohemi Lilly RN  Outcome: Ongoing     Problem: Fluid Volume:  Goal: Maintenance of adequate hydration will improve  Description: Maintenance of adequate hydration will improve  11/21/2021 2330 by Braulio Machado RN  Outcome: Ongoing  11/21/2021 1727 by Robert Thompson RN  Outcome: Ongoing     Problem: Urinary Elimination:  Goal: Will remain free from infection  Description: Will remain free from infection  11/21/2021 2330 by Damián Carrillo RN  Outcome: Ongoing  11/21/2021 1727 by Robert Thompson RN  Outcome: Ongoing  Goal: Skin integrity will improve  Description: Skin integrity will improve  11/21/2021 2330 by Damián Carrillo RN  Outcome: Ongoing  11/21/2021 1727 by Robert Thompson RN  Outcome: Ongoing  Goal: Ability to recognize the need to void and respond appropriately will improve  Description: Ability to recognize the need to void and respond appropriately will improve  11/21/2021 2330 by Damián Carrillo RN  Outcome: Ongoing  11/21/2021 1727 by Robert Thompson RN  Outcome: Ongoing  Goal: Incidence of incontinence will decrease  Description: Incidence of incontinence will decrease  11/21/2021 2330 by Damián Carrillo RN  Outcome: Ongoing  11/21/2021 1727 by Robert Thompson RN  Outcome: Ongoing     Problem: Nutritional:  Goal: Nutritional status will improve  Description: Nutritional status will improve  11/21/2021 2330 by Damián Carrillo RN  Outcome: Ongoing  11/21/2021 1727 by Robert Thompson RN  Outcome: Ongoing  Goal: Ability to identify appropriate dietary choices will improve  Description: Ability to identify appropriate dietary choices will improve  11/21/2021 2330 by Damián Carrillo RN  Outcome: Ongoing  11/21/2021 1727 by Robert Thompson RN  Outcome: Ongoing  Goal: Ability to chew and swallow food without choking will improve  11/21/2021 2330 by Damián Carrillo RN  Outcome: Ongoing  11/21/2021 1727 by Robert Thompson RN  Outcome: Ongoing  Goal: Ability to achieve adequate nutritional intake will improve  11/21/2021 2330 by Damián Carrillo RN  Outcome: Ongoing  11/21/2021 1727 by Robert Thompson RN  Outcome: Ongoing  Goal: Ability to maintain an optimal weight for height and age will improve  11/21/2021 2330 by Tabitha Pappas RN  Outcome: Ongoing  11/21/2021 1727 by Celso Leiva RN  Outcome: Ongoing  Goal: Consumption of the prescribed amount of daily calories will improve  Description: Ability to maintain an optimal weight for height and age will improve  11/21/2021 2330 by Tabitha Pappas RN  Outcome: Ongoing  11/21/2021 1727 by Celso Leiva RN  Outcome: Ongoing  Goal: Ability to make healthy dietary choices will improve  Description: Consumption of the prescribed amount of daily calories will improve  11/21/2021 2330 by Tabitha Pappas RN  Outcome: Ongoing  11/21/2021 1727 by Celso Leiva RN  Outcome: Ongoing  Goal: Progress toward achieving an optimal weight will improve  Description: Ability to make healthy dietary choices will improve  11/21/2021 2330 by Tabitha Pappas RN  Outcome: Ongoing  11/21/2021 1727 by Celso Leiva RN  Outcome: Ongoing     Problem: Physical Regulation:  Goal: Will remain free from infection  Description: Will remain free from infection  11/21/2021 2330 by Tabitha Pappas RN  Outcome: Ongoing  11/21/2021 1727 by Celso Leiva RN  Outcome: Ongoing  Goal: Compliance with treatment plan for underlying cause of condition will improve  Description: Compliance with treatment plan for underlying cause of condition will improve  11/21/2021 2330 by Tabitha Pappas RN  Outcome: Ongoing  11/21/2021 1727 by Celso Leiva RN  Outcome: Ongoing  Goal: Diagnostic test results will improve  Description: Diagnostic test results will improve  11/21/2021 2330 by Tabitha Pappas RN  Outcome: Ongoing  11/21/2021 1727 by Celso Leiva RN  Outcome: Ongoing  Goal: Ability to maintain vital signs within normal range will improve  Description: Ability to maintain vital signs within normal range will improve  11/21/2021 2330 by Tabitha Pappas RN  Outcome: Ongoing  11/21/2021 1727 by Celso Leiva RN  Outcome: Ongoing  Goal: Complications related to the disease process, condition or treatment will be avoided or minimized  Description: Complications related to the disease process, condition or treatment will be avoided or minimized  11/21/2021 2330 by Lexi Blood RN  Outcome: Ongoing  11/21/2021 1727 by Shahzad Chang RN  Outcome: Ongoing  Goal: Ability to avoid complications of mobility impairment will improve  11/21/2021 2330 by Lexi Blood RN  Outcome: Ongoing  11/21/2021 1727 by Shahzad Chang RN  Outcome: Ongoing     Problem: Respiratory:  Goal: Ability to maintain normal respiratory secretions will improve  Description: Ability to maintain normal respiratory secretions will improve  11/21/2021 2330 by Lexi Blood RN  Outcome: Ongoing  11/21/2021 1727 by Shahzad Chang RN  Outcome: Ongoing  Goal: Ability to maintain adequate ventilation will improve  Description: Ability to maintain adequate ventilation will improve  11/21/2021 2330 by Lexi Blood RN  Outcome: Ongoing  11/21/2021 1727 by Shahzad Chang RN  Outcome: Ongoing  Goal: Levels of oxygenation will improve  11/21/2021 2330 by Lexi Blood RN  Outcome: Ongoing  11/21/2021 1727 by Shahzad Chang RN  Outcome: Ongoing     Problem: Bleeding:  Goal: Will show no signs and symptoms of excessive bleeding  Description: Will show no signs and symptoms of excessive bleeding  11/21/2021 1727 by Shahzad Chang RN  Outcome: Ongoing     Problem:  Activity:  Goal: Risk for activity intolerance will decrease  Description: Risk for activity intolerance will decrease  11/21/2021 1727 by Shahzad Chang RN  Outcome: Ongoing  Goal: Will verbalize the importance of balancing activity with adequate rest periods  Description: Will verbalize the importance of balancing activity with adequate rest periods  11/21/2021 1727 by Shahzad Chang RN  Outcome: Ongoing  Goal: Ability to tolerate increased activity will improve  Description: Ability to tolerate increased activity will improve  11/21/2021 1727 by Dena Roe RN  Outcome: Ongoing  Goal: Amount of time patient spends in regular exercise will increase  Description: Amount of time patient spends in regular exercise will increase  11/21/2021 1727 by Dena oRe RN  Outcome: Ongoing  Goal: Sleep-wake cycle will improve  Description: Sleep-wake cycle will improve  11/21/2021 1727 by Dena Roe RN  Outcome: Ongoing  Goal: Ability to ambulate will improve  Description: Ability to ambulate will improve  11/21/2021 1727 by Dena Roe RN  Outcome: Ongoing  Goal: Muscle strength will improve  Description: Muscle strength will improve  11/21/2021 1727 by Dena Roe RN  Outcome: Ongoing  Goal: Range of joint motion will improve  Description: Range of joint motion will improve  11/21/2021 1727 by Dena Roe RN  Outcome: Ongoing     Problem: Cardiac:  Goal: Diagnostic test results will improve  Description: Diagnostic test results will improve  11/21/2021 2330 by Kg Morales RN  Outcome: Ongoing  11/21/2021 1727 by Dena Roe RN  Outcome: Ongoing  Goal: Complications related to the disease process, condition or treatment will be avoided or minimized  Description: Complications related to the disease process, condition or treatment will be avoided or minimized  11/21/2021 2330 by Kg Morales RN  Outcome: Ongoing  11/21/2021 1727 by Dena Roe RN  Outcome: Ongoing  Goal: Ability to maintain an adequate cardiac output will improve  Description: Ability to maintain an adequate cardiac output will improve  11/21/2021 1727 by Dena Roe RN  Outcome: Ongoing  Goal: Hemodynamic stability will improve  Description: Hemodynamic stability will improve  11/21/2021 1727 by Dena Roe RN  Outcome: Ongoing  Goal: Cerebral tissue perfusion will improve  Description: Cerebral tissue perfusion will improve  11/21/2021 1727 by Dena Roe RN  Outcome: Ongoing     Problem: Coping:  Goal: Ability to verbalize feelings about condition will improve  Description: Ability to verbalize feelings about condition will improve  11/21/2021 1727 by Joe Huerta RN  Outcome: Ongoing  Goal: Ability to identify strategies to decrease anxiety will improve  Description: Ability to identify strategies to decrease anxiety will improve  11/21/2021 1727 by Joe Huerta RN  Outcome: Ongoing  Goal: Ability to identify and alter barriers to satisfying sexual function will improve  Description: Ability to identify and alter barriers to satisfying sexual function will improve  11/21/2021 1727 by Joe Huerta RN  Outcome: Ongoing  Goal: Ability to identify and develop effective coping behavior will improve  Description: Ability to identify and develop effective coping behavior will improve  11/21/2021 1727 by Joe Huerta RN  Outcome: Ongoing     Problem: Sensory:  Goal: Pain level will decrease  Description: Pain level will decrease  11/21/2021 1727 by Joe Huerta RN  Outcome: Ongoing  Goal: General experience of comfort will improve  Description: General experience of comfort will improve  11/21/2021 1727 by Joe Huerta RN  Outcome: Ongoing     Problem: SAFETY  Goal: Free from accidental physical injury  11/21/2021 1727 by Joe Huerta RN  Outcome: Ongoing  Goal: Free from intentional harm  11/21/2021 1727 by Joe Huerta RN  Outcome: Ongoing     Problem: ABCDS Injury Assessment  Goal: Absence of physical injury  11/21/2021 1727 by Joe Huerta RN  Outcome: Ongoing     Problem:  Bowel/Gastric:  Goal: Will show no signs and symptoms of gastrointestinal bleeding  Description: Will show no signs and symptoms of gastrointestinal bleeding  11/21/2021 1727 by Joe Huerta RN  Outcome: Ongoing     Problem: DISCHARGE BARRIERS  Goal: Patient's continuum of care needs are met  11/21/2021 1727 by Joe Huerta RN  Outcome: Ongoing

## 2021-11-23 VITALS
WEIGHT: 143.06 LBS | RESPIRATION RATE: 18 BRPM | DIASTOLIC BLOOD PRESSURE: 56 MMHG | HEIGHT: 62 IN | TEMPERATURE: 97.3 F | OXYGEN SATURATION: 95 % | SYSTOLIC BLOOD PRESSURE: 137 MMHG | BODY MASS INDEX: 26.33 KG/M2 | HEART RATE: 64 BPM

## 2021-11-23 LAB
ALBUMIN SERPL-MCNC: 3.6 G/DL (ref 3.5–5.2)
ALP BLD-CCNC: 73 U/L (ref 35–104)
ALT SERPL-CCNC: 10 U/L (ref 5–33)
ANION GAP SERPL CALCULATED.3IONS-SCNC: 13 MMOL/L (ref 7–19)
AST SERPL-CCNC: 15 U/L (ref 5–32)
BASOPHILS ABSOLUTE: 0 K/UL (ref 0–0.2)
BASOPHILS RELATIVE PERCENT: 0.7 % (ref 0–1)
BILIRUB SERPL-MCNC: 0.3 MG/DL (ref 0.2–1.2)
BUN BLDV-MCNC: 35 MG/DL (ref 8–23)
CALCIUM SERPL-MCNC: 10.4 MG/DL (ref 8.8–10.2)
CHLORIDE BLD-SCNC: 104 MMOL/L (ref 98–111)
CO2: 22 MMOL/L (ref 22–29)
CREAT SERPL-MCNC: 1.4 MG/DL (ref 0.5–0.9)
CULTURE, BLOOD 2: NORMAL
EOSINOPHILS ABSOLUTE: 0.2 K/UL (ref 0–0.6)
EOSINOPHILS RELATIVE PERCENT: 3.3 % (ref 0–5)
GFR AFRICAN AMERICAN: 44
GFR NON-AFRICAN AMERICAN: 36
GLUCOSE BLD-MCNC: 107 MG/DL (ref 74–109)
HCT VFR BLD CALC: 40.7 % (ref 37–47)
HEMOGLOBIN: 12.5 G/DL (ref 12–16)
IMMATURE GRANULOCYTES #: 0 K/UL
LYMPHOCYTES ABSOLUTE: 1.8 K/UL (ref 1.1–4.5)
LYMPHOCYTES RELATIVE PERCENT: 29 % (ref 20–40)
MCH RBC QN AUTO: 26.9 PG (ref 27–31)
MCHC RBC AUTO-ENTMCNC: 30.7 G/DL (ref 33–37)
MCV RBC AUTO: 87.7 FL (ref 81–99)
MONOCYTES ABSOLUTE: 0.5 K/UL (ref 0–0.9)
MONOCYTES RELATIVE PERCENT: 7.9 % (ref 0–10)
NEUTROPHILS ABSOLUTE: 3.5 K/UL (ref 1.5–7.5)
NEUTROPHILS RELATIVE PERCENT: 58.6 % (ref 50–65)
PDW BLD-RTO: 15.4 % (ref 11.5–14.5)
PLATELET # BLD: 203 K/UL (ref 130–400)
PMV BLD AUTO: 10.6 FL (ref 9.4–12.3)
POTASSIUM REFLEX MAGNESIUM: 3.8 MMOL/L (ref 3.5–5)
RBC # BLD: 4.64 M/UL (ref 4.2–5.4)
SARS-COV-2, NAAT: NOT DETECTED
SODIUM BLD-SCNC: 139 MMOL/L (ref 136–145)
TOTAL PROTEIN: 5.8 G/DL (ref 6.6–8.7)
WBC # BLD: 6 K/UL (ref 4.8–10.8)

## 2021-11-23 PROCEDURE — 2500000003 HC RX 250 WO HCPCS: Performed by: NURSE PRACTITIONER

## 2021-11-23 PROCEDURE — 92610 EVALUATE SWALLOWING FUNCTION: CPT

## 2021-11-23 PROCEDURE — 36415 COLL VENOUS BLD VENIPUNCTURE: CPT

## 2021-11-23 PROCEDURE — 6370000000 HC RX 637 (ALT 250 FOR IP): Performed by: INTERNAL MEDICINE

## 2021-11-23 PROCEDURE — 85025 COMPLETE CBC W/AUTO DIFF WBC: CPT

## 2021-11-23 PROCEDURE — 6370000000 HC RX 637 (ALT 250 FOR IP): Performed by: NURSE PRACTITIONER

## 2021-11-23 PROCEDURE — 2580000003 HC RX 258: Performed by: NURSE PRACTITIONER

## 2021-11-23 PROCEDURE — 6360000002 HC RX W HCPCS: Performed by: NURSE PRACTITIONER

## 2021-11-23 PROCEDURE — 80053 COMPREHEN METABOLIC PANEL: CPT

## 2021-11-23 PROCEDURE — 99232 SBSQ HOSP IP/OBS MODERATE 35: CPT | Performed by: PSYCHIATRY & NEUROLOGY

## 2021-11-23 PROCEDURE — 92523 SPEECH SOUND LANG COMPREHEN: CPT

## 2021-11-23 PROCEDURE — 87635 SARS-COV-2 COVID-19 AMP PRB: CPT

## 2021-11-23 PROCEDURE — 6370000000 HC RX 637 (ALT 250 FOR IP): Performed by: HOSPITALIST

## 2021-11-23 PROCEDURE — 1210000000 HC MED SURG R&B

## 2021-11-23 PROCEDURE — 97530 THERAPEUTIC ACTIVITIES: CPT

## 2021-11-23 RX ADMIN — LACTULOSE 30 G: 20 SOLUTION ORAL at 08:42

## 2021-11-23 RX ADMIN — ONDANSETRON 4 MG: 4 TABLET, ORALLY DISINTEGRATING ORAL at 17:43

## 2021-11-23 RX ADMIN — LEVOTHYROXINE SODIUM 100 MCG: 50 TABLET ORAL at 05:14

## 2021-11-23 RX ADMIN — ATORVASTATIN CALCIUM 20 MG: 20 TABLET, FILM COATED ORAL at 08:42

## 2021-11-23 RX ADMIN — ASPIRIN 81 MG: 81 TABLET, COATED ORAL at 08:43

## 2021-11-23 RX ADMIN — LOSARTAN POTASSIUM 25 MG: 50 TABLET, FILM COATED ORAL at 08:42

## 2021-11-23 RX ADMIN — ACETAMINOPHEN 650 MG: 325 TABLET ORAL at 08:42

## 2021-11-23 RX ADMIN — ONDANSETRON 4 MG: 2 INJECTION INTRAMUSCULAR; INTRAVENOUS at 12:20

## 2021-11-23 RX ADMIN — FAMOTIDINE 20 MG: 10 INJECTION, SOLUTION INTRAVENOUS at 08:42

## 2021-11-23 RX ADMIN — SODIUM CHLORIDE, PRESERVATIVE FREE 10 ML: 5 INJECTION INTRAVENOUS at 08:43

## 2021-11-23 RX ADMIN — AMLODIPINE BESYLATE 5 MG: 5 TABLET ORAL at 08:42

## 2021-11-23 RX ADMIN — DOCUSATE SODIUM 100 MG: 100 CAPSULE, LIQUID FILLED ORAL at 08:42

## 2021-11-23 ASSESSMENT — PAIN SCALES - WONG BAKER: WONGBAKER_NUMERICALRESPONSE: 0

## 2021-11-23 ASSESSMENT — PAIN SCALES - GENERAL: PAINLEVEL_OUTOF10: 6

## 2021-11-23 NOTE — PROGRESS NOTES
Speech Language Pathology  Facility/Department: [facility] 3 SHAAN/VAS/MED  Initial Speech/Language/Cognitive/Swallow Assessment    NAME: Yovani Pressley  : 1940   MRN: [id number]  ADMISSION DATE: 2021  ADMITTING DIAGNOSIS: has Secondary hypertension; Coronary artery disease involving native coronary artery of native heart without angina pectoris; Left bundle branch block; Benign hypertension; Hyperlipidemia; Altered mental status; Palliative care patient; Acute encephalopathy; Visual hallucinations; Parkinsonism ([address] Generalized weakness; and Primary hypertension on their problem list.    Date of Eval: 2021   Evaluating Therapist: TRISTIN Padron    Assessment:  Completed assessment. Patient exhibited decreased volume of speech and slow, decreased lingual movements during verbalizations. SLP still ranked functional intelligibility of speech for unfamiliar listeners at 100% in utterances with background noise present. Patient also exhibited slow processing, delayed and decreased auditory comprehension (particularly as length and complexity of information increases), frequently delayed verbalizations with mild fragmentations noted, decreased immediate/short-term memory, and decreased high level reasoning/problem solving. It is noted that during evaluation, patient did not verbalize current PCP or pharmacy at independent level. Patient demonstrated difficulty verbalizing conditions in which she takes medications independently. Patient demonstrated difficulty verbalizing appropriate simple solutions to situations that could occur during activities of daily living at independent level. Also assessed patient's swallowing function. Patient exhibited slow oral prep of more solid consistencies as well as sluggish laryngeal elevation for swallow airway protection.  Even so, no outward S/S penetration/aspiration was observed with any regular solid consistency trial or thin H2O trial presented during evaluation this date. At this time, would recommend continuation easy to chew consistency with thin liquids. Self-feed. Will continue to follow. Thank you for this consult. Goals:  Short-term Goals  Timeframe for Short-term Goals: 1-2x/day for 3 days  Goal 1: Patient will tolerate easy to chew consistency and thin liquids with min S/S penetration/aspiration during PO intake. Goal 2: Patient staff will follow swallow safety recommendations to decrease risk of penetration/aspiration during PO intake. Goal 3: Monitor [id number] functioning. Subjective:  Chart Reviewed: Yes  Patient assessed for rehabilitation services?: Yes  Family / Caregiver Present: No     Objective:  Oral Motor:   Labial ROM: Decreased, on the left, during labial retraction trials and labial protrusion trials. Labial Strength: Adequate during labial compression trials. Labial Coordination: Adequate movements were noted. Lingual ROM: Decreased during lingual extension trials with no full point achieved; decreased during lingual elevation trials without use of accessory jaw movement; adequate movements noted bilaterally. Lingual Strength: Decreased   Lingual Coordination: Slowed movements were noted. Auditory Comprehension  Comprehension:  (Patient demonstrated ability to answer simple yes/no questions regarding immediate environment and current state of being at independent level and with adequate processing speed. Patient demonstrated ability to follow simple 1 step commands independently and with adequate processing speed. While delayed, patient demonstrated ability to answer yes/no questions of increased complexity and to follow simple 2 step commands at independent level. Delays were noted and break down was observed during complex 1 step commands.)     Expression  Primary Mode of Expression:  (Confrontation naming of items in room was considered to be [facility].  Structured responsive speech and responses in natural conversation were considered to be frequently delayed and mildly fragmented where the patient started expressions but then discontinued.)     Motor Speech:  (Patient exhibited decreased volume of speech and slow, decreased lingual movements during verbalizations. SLP still ranked functional intelligibility of speech for unfamiliar listeners at 100% in utterances with background noise present.)     Overall Orientation Status:  (Patient demonstrated ability to verbalize name, birthday, age, city, state, month, day of week, and year at independent level. Patient did not verbalize address, phone number, hospital, or date independently.)     Memory:  (Patient demonstrated decreased immediate memory with sequences of unrelated numbers/words set up to 5 items without repetitions provided. Patient demonstrated decreased short-term memory with less than 10 minute delay+distractions present.)     Problem Solving:  (It is noted that during evaluation, patient did not verbalize current PCP or pharmacy at independent level. Patient demonstrated difficulty verbalizing conditions in which she takes medications independently. Patient demonstrated difficulty verbalizing appropriate simple solutions to situations that could occur during activities of daily living at independent level.)    Additional Assessment:   Assessed patient's swallowing function. With regular solid consistency trials presented independently, patient exhibited slow oral prep. Oral transit of regular solid consistency varied from 1-3 seconds in length and min oral cavity residue was noted post swallows; residue cleared from the mouth with additional dry swallows. Oral transit thin H2O trials, presented independently via straw, primarily measured 1-2 seconds in length. Laryngeal elevation during swallow initiation was considered to be sluggish for swallow airway protection.  Even so, no outward S/S penetration/aspiration was observed with any regular solid consistency trial or thin H2O trial presented during evaluation this date. At this time, would recommend continuation easy to chew consistency and thin liquids. Self-feed. Will continue to follow.      Electronically signed by TRISTIN Correa on 11/23/2021 at 12:58 PM

## 2021-11-23 NOTE — PROGRESS NOTES
11/23/21 1137   Restrictions/Precautions   Restrictions/Precautions Fall Risk   Required Braces or Orthoses? No   General   Chart Reviewed Yes   Subjective   Subjective Patient agrees to therapy   General Comment   Comments Patien sat EOB x10 minutes to work on sitting balance. Patient has posterior lean in sitting and standing   Pain Screening   Patient Currently in Pain Denies   Intervention List Patient able to continue with treatment   Vital Signs   Level of Consciousness Alert (0)   Oxygen Therapy   O2 Device None (Room air)   Patient Observation   Observations Patient C/O some dizziness and nausea with TR  thought she was going to throw up blue bags obtained .   Patient did not vommit   Orientation   Overall Orientation Status WNL   Bed Mobility   Supine to Sit Moderate assistance   Scooting Maximal assistance   Transfers   Sit to Stand Moderate Assistance   Stand to sit Moderate Assistance   Bed to Chair Maximum assistance   Ambulation 1   Device Rolling Walker   Assistance Maximum assistance   Quality of Gait Unsteady posterior lean difficulty stepping   Gait Deviations Slow Marcelle; Decreased step length; Decreased step height   Distance 2'   Comments Patient stepped to chair   Other Activities   Comment Extra time PCA  donned brief,  therapist obtained waffle, paper pad, stretch briefs, and  blue bags   Activity Tolerance   Activity Tolerance Patient limited by fatigue; Patient limited by endurance; Treatment limited secondary to medical complications (free text)  (Vestibular problems)   Safety Devices   Type of devices Left in chair; Call light within reach  (Daughter present)   Physical Therapy    Electronically signed by Bandar Feldman PTA on 11/23/2021 at 11:58 AM

## 2021-11-23 NOTE — PLAN OF CARE
Problem: Falls - Risk of:  Goal: Will remain free from falls  Description: Will remain free from falls  Outcome: Ongoing  Goal: Absence of physical injury  Description: Absence of physical injury  Outcome: Ongoing     Problem: Skin Integrity:  Goal: Will show no infection signs and symptoms  Description: Will show no infection signs and symptoms  Outcome: Ongoing  Goal: Absence of new skin breakdown  Description: Absence of new skin breakdown  Outcome: Ongoing  Goal: Demonstration of wound healing without infection will improve  Description: Demonstration of wound healing without infection will improve  Outcome: Ongoing  Goal: Complications related to intravenous access or infusion will be avoided or minimized  Description: Complications related to intravenous access or infusion will be avoided or minimized  Outcome: Ongoing     Problem: Confusion - Acute:  Goal: Absence of continued neurological deterioration signs and symptoms  Description: Absence of continued neurological deterioration signs and symptoms  Outcome: Ongoing  Goal: Mental status will be restored to baseline  Description: Mental status will be restored to baseline  Outcome: Ongoing     Problem: Discharge Planning:  Goal: Ability to perform activities of daily living will improve  Description: Ability to perform activities of daily living will improve  Outcome: Ongoing  Goal: Participates in care planning  Description: Participates in care planning  Outcome: Ongoing  Goal: Discharged to appropriate level of care  Description: Discharged to appropriate level of care  Outcome: Ongoing     Problem: Injury - Risk of, Physical Injury:  Goal: Will remain free from falls  Description: Will remain free from falls  Outcome: Ongoing  Goal: Absence of physical injury  Description: Absence of physical injury  Outcome: Ongoing     Problem: Mood - Altered:  Goal: Mood stable  Description: Mood stable  Outcome: Ongoing  Goal: Absence of abusive behavior  Description: Absence of abusive behavior  Outcome: Ongoing  Goal: Verbalizations of feeling emotionally comfortable while being cared for will increase  Description: Verbalizations of feeling emotionally comfortable while being cared for will increase  Outcome: Ongoing     Problem: Psychomotor Activity - Altered:  Goal: Absence of psychomotor disturbance signs and symptoms  Description: Absence of psychomotor disturbance signs and symptoms  Outcome: Ongoing     Problem: Sensory Perception - Impaired:  Goal: Demonstrations of improved sensory functioning will increase  Description: Demonstrations of improved sensory functioning will increase  Outcome: Ongoing  Goal: Decrease in sensory misperception frequency  Description: Decrease in sensory misperception frequency  Outcome: Ongoing  Goal: Able to refrain from responding to false sensory perceptions  Description: Able to refrain from responding to false sensory perceptions  Outcome: Ongoing  Goal: Demonstrates accurate environmental perceptions  Description: Demonstrates accurate environmental perceptions  Outcome: Ongoing  Goal: Able to distinguish between reality-based and nonreality-based thinking  Description: Able to distinguish between reality-based and nonreality-based thinking  Outcome: Ongoing  Goal: Able to interrupt nonreality-based thinking  Description: Able to interrupt nonreality-based thinking  Outcome: Ongoing     Problem: Sleep Pattern Disturbance:  Goal: Appears well-rested  Description: Appears well-rested  Outcome: Ongoing     Problem: Infection:  Goal: Will remain free from infection  Description: Will remain free from infection  Outcome: Ongoing     Problem: Safety:  Goal: Free from accidental physical injury  Description: Free from accidental physical injury  Outcome: Ongoing  Goal: Free from intentional harm  Description: Free from intentional harm  Outcome: Ongoing  Goal: Ability to appropriately use an adaptive device for ambulation will improve  Description: Ability to appropriately use an adaptive device for ambulation will improve  Outcome: Ongoing  Goal: Ability to safely and independently change position in bed will improve  Description: Ability to safely and independently change position in bed will improve  Outcome: Ongoing  Goal: Ability to safely transfer will improve  Description: Ability to safely transfer will improve  Outcome: Ongoing     Problem: Daily Care:  Goal: Daily care needs are met  Description: Daily care needs are met  Outcome: Ongoing     Problem: Pain:  Description: Pain management should include both nonpharmacologic and pharmacologic interventions.   Goal: Patient's pain/discomfort is manageable  Description: Patient's pain/discomfort is manageable  Outcome: Ongoing  Goal: Pain level will decrease  Description: Pain level will decrease  Outcome: Ongoing  Goal: Control of acute pain  Description: Control of acute pain  Outcome: Ongoing  Goal: Control of chronic pain  Description: Control of chronic pain  Outcome: Ongoing     Problem: Skin Integrity:  Goal: Skin integrity will stabilize  Description: Skin integrity will stabilize  Outcome: Ongoing     Problem: Discharge Planning:  Goal: Patients continuum of care needs are met  Description: Patients continuum of care needs are met  Outcome: Ongoing     Problem: Health Behavior:  Goal: Compliance with treatment plan for underlying cause of condition will improve  Description: Compliance with treatment plan for underlying cause of condition will improve  Outcome: Ongoing  Goal: Identification of resources available to assist in meeting health care needs will improve  Description: Identification of resources available to assist in meeting health care needs will improve  Outcome: Ongoing  Goal: Ability to identify changes in lifestyle to reduce recurrence of condition will improve  Description: Ability to identify changes in lifestyle to reduce recurrence of condition will improve  Outcome: Ongoing  Goal: Ability to manage health-related needs will improve  Description: Ability to manage health-related needs will improve  Outcome: Ongoing     Problem: Fluid Volume:  Goal: Maintenance of adequate hydration will improve  Description: Maintenance of adequate hydration will improve  Outcome: Ongoing     Problem: Urinary Elimination:  Goal: Will remain free from infection  Description: Will remain free from infection  Outcome: Ongoing  Goal: Skin integrity will improve  Description: Skin integrity will improve  Outcome: Ongoing  Goal: Ability to recognize the need to void and respond appropriately will improve  Description: Ability to recognize the need to void and respond appropriately will improve  Outcome: Ongoing  Goal: Incidence of incontinence will decrease  Description: Incidence of incontinence will decrease  Outcome: Ongoing     Problem: Nutritional:  Goal: Nutritional status will improve  Description: Nutritional status will improve  Outcome: Ongoing  Goal: Ability to identify appropriate dietary choices will improve  Description: Ability to identify appropriate dietary choices will improve  Outcome: Ongoing  Goal: Ability to chew and swallow food without choking will improve  Outcome: Ongoing  Goal: Ability to achieve adequate nutritional intake will improve  Outcome: Ongoing  Goal: Ability to maintain an optimal weight for height and age will improve  Outcome: Ongoing  Goal: Consumption of the prescribed amount of daily calories will improve  Description: Ability to maintain an optimal weight for height and age will improve  Outcome: Ongoing  Goal: Ability to make healthy dietary choices will improve  Description: Consumption of the prescribed amount of daily calories will improve  Outcome: Ongoing  Goal: Progress toward achieving an optimal weight will improve  Description: Ability to make healthy dietary choices will improve  Outcome: Ongoing     Problem: Physical Regulation:  Goal: Will remain free from infection  Description: Will remain free from infection  Outcome: Ongoing  Goal: Compliance with treatment plan for underlying cause of condition will improve  Description: Compliance with treatment plan for underlying cause of condition will improve  Outcome: Ongoing  Goal: Diagnostic test results will improve  Description: Diagnostic test results will improve  Outcome: Ongoing  Goal: Ability to maintain vital signs within normal range will improve  Description: Ability to maintain vital signs within normal range will improve  Outcome: Ongoing  Goal: Complications related to the disease process, condition or treatment will be avoided or minimized  Description: Complications related to the disease process, condition or treatment will be avoided or minimized  Outcome: Ongoing  Goal: Ability to avoid complications of mobility impairment will improve  Outcome: Ongoing     Problem: Respiratory:  Goal: Ability to maintain normal respiratory secretions will improve  Description: Ability to maintain normal respiratory secretions will improve  Outcome: Ongoing  Goal: Ability to maintain adequate ventilation will improve  Description: Ability to maintain adequate ventilation will improve  Outcome: Ongoing  Goal: Levels of oxygenation will improve  Outcome: Ongoing     Problem: Cardiac:  Goal: Diagnostic test results will improve  Description: Diagnostic test results will improve  Outcome: Ongoing  Goal: Complications related to the disease process, condition or treatment will be avoided or minimized  Description: Complications related to the disease process, condition or treatment will be avoided or minimized  Outcome: Ongoing     Problem: Sensory:  Goal: Pain level will decrease  Description: Pain level will decrease  Outcome: Ongoing

## 2021-11-23 NOTE — PROGRESS NOTES
[id number] [facility] Neurology  [address] [place] [address] Drive, [address]  [address] [address]  Phone [phone]     Neurology Progress Note  2021 4:47 PM  Information:   Patient Name: Va Turk  :   1940  Age:   79 y.o. MRN:   [id number]  Account #:  [de-identified]  Admit Date:   2021  Today:  21     ADMIT DX:   Altered mental status    Subjective:     Anya lAas a 79 y. o. year old woman with a history of hypertension, hyperlipidemia, and cardiovascular disease who was admitted  with confusion and hallucinations.     Interval History:   She continues to gradually improve. She has had no overt hallucinations. She requires assistance to stand and take just a couple steps. Her daughter says her mental status is good most of the time but she will say some odd things that imply some confusion.     Objective:     Past Medical History:  Past Medical History:   Diagnosis Date    Arthritis     Bladder infection     CAD (coronary artery disease)     Cardiovascular disease     Chronic kidney disease     Colon polyps     Constipation     Gallbladder disease     GERD (gastroesophageal reflux disease)     Gout     Heart attack ([address]     Heart disease     Hyperlipidemia     Hypertension     Incontinence     Thyroid disease        Past Surgical History:   Procedure Laterality Date    CHOLECYSTECTOMY      CORONARY ARTERY BYPASS GRAFT      heart bypass    HYSTERECTOMY         Family History   Problem Relation Age of Onset    Heart Attack Father     Cancer Mother        Social History     Socioeconomic History    Marital status:      Spouse name: Not on file    Number of children: Not on file    Years of education: Not on file    Highest education level: Not on file   Occupational History    Not on file   Tobacco Use    Smoking status: Never Smoker    Smokeless tobacco: Never Used   Vaping Use    Vaping Use: Never used   Substance and Sexual Activity    Alcohol use: Not Currently    Drug use: Not Currently    Sexual activity: Not Currently   Other Topics Concern    Not on file   Social History Narrative    Not on file     Social Determinants of Health     Financial Resource Strain:     Difficulty of Paying Living Expenses: Not on file   Food Insecurity:     Worried About Running Out of Food in the Last Year: Not on file    Анна of Food in the Last Year: Not on file   Transportation Needs:     Lack of Transportation (Medical): Not on file    Lack of Transportation (Non-Medical):  Not on file   Physical Activity:     Days of Exercise per Week: Not on file    Minutes of Exercise per Session: Not on file   Stress:     Feeling of Stress : Not on file   Social Connections:     Frequency of Communication with Friends and Family: Not on file    Frequency of Social Gatherings with Friends and Family: Not on file    Attends Baptism Services: Not on file    Active Member of [employer] or Organizations: Not on file    Attends Club or Organization Meetings: Not on file    Marital Status: Not on file   Intimate Partner Violence:     Fear of Current or Ex-Partner: Not on file    Emotionally Abused: Not on file    Physically Abused: Not on file    Sexually Abused: Not on file   Housing Stability:     Unable to Pay for Housing in the Last Year: Not on file    Number of Places Lived in the Last Year: Not on file    Unstable Housing in the Last Year: Not on file       Medications:   sodium chloride        lactulose  30 g Oral BID    docusate sodium  100 mg Oral Daily    atorvastatin  20 mg Oral Daily    aspirin  81 mg Oral Daily    amLODIPine  5 mg Oral Daily    levothyroxine  100 mcg Oral Daily    losartan  25 mg Oral Daily    sodium chloride flush  5-40 mL IntraVENous 2 times per day    [Held by provider] enoxaparin  30 mg SubCUTAneous Q24H    famotidine (PEPCID) injection  20 mg IntraVENous Daily       Diagnostic Studies:  Reviewed all labs/diagnositcs since last 24hrs:  Recent Results (from the past 24 hour(s))   Comprehensive Metabolic Panel w/ Reflex to MG    Collection Time: 11/23/21  2:37 AM   Result Value Ref Range    Sodium 139 136 - 145 mmol/L    Potassium reflex Magnesium 3.8 3.5 - 5.0 mmol/L    Chloride 104 98 - 111 mmol/L    CO2 22 22 - 29 mmol/L    Anion Gap 13 7 - 19 mmol/L    Glucose 107 74 - 109 mg/dL    BUN 35 (H) 8 - 23 mg/dL    CREATININE 1.4 (H) 0.5 - 0.9 mg/dL    GFR Non- 36 (A) >60    GFR  44 (L) >59    Calcium 10.4 (H) 8.8 - 10.2 mg/dL    Total Protein 5.8 (L) 6.6 - 8.7 g/dL    Albumin 3.6 3.5 - 5.2 g/dL    Total Bilirubin 0.3 0.2 - 1.2 mg/dL    Alkaline Phosphatase 73 35 - 104 U/L    ALT 10 5 - 33 U/L    AST 15 5 - 32 U/L   CBC Auto Differential    Collection Time: 11/23/21  2:37 AM   Result Value Ref Range    WBC 6.0 4.8 - 10.8 K/uL    RBC 4.64 4.20 - 5.40 M/uL    Hemoglobin 12.5 12.0 - 16.0 g/dL    Hematocrit 40.7 37.0 - 47.0 %    MCV 87.7 81.0 - 99.0 fL    MCH 26.9 (L) 27.0 - 31.0 pg    MCHC 30.7 (L) 33.0 - 37.0 g/dL    RDW 15.4 (H) 11.5 - 14.5 %    Platelets 359 182 - 187 K/uL    MPV 10.6 9.4 - 12.3 fL    Neutrophils % 58.6 50.0 - 65.0 %    Lymphocytes % 29.0 20.0 - 40.0 %    Monocytes % 7.9 0.0 - 10.0 %    Eosinophils % 3.3 0.0 - 5.0 %    Basophils % 0.7 0.0 - 1.0 %    Neutrophils Absolute 3.5 1.5 - 7.5 K/uL    Immature Granulocytes # 0.0 K/uL    Lymphocytes Absolute 1.8 1.1 - 4.5 K/uL    Monocytes Absolute 0.50 0.00 - 0.90 K/uL    Eosinophils Absolute 0.20 0.00 - 0.60 K/uL    Basophils Absolute 0.00 0.00 - 0.20 K/uL   COVID-19, Rapid    Collection Time: 11/23/21  1:50 PM    Specimen: Nasopharyngeal Swab; Nasal   Result Value Ref Range    SARS-CoV-2, NAAT Not Detected Not Detected       Diet:  ADULT ORAL NUTRITION SUPPLEMENT; Dinner; Standard High Calorie/High Protein Oral Supplement  ADULT DIET; Easy to Chew; Low Fat/Low Chol/High Fiber/YOLY    Examination:  Vitals: BP (!) 150/66   Pulse 61   Temp 96.8 °F (36 °C) (Temporal)   Resp 18   Ht 5' 2\" (1.575 m)   Wt 143 lb 1 oz (64.9 kg)   SpO2 96%   BMI 26.17 kg/m²      Intake/Output Summary (Last 24 hours) at 11/23/2021 1647  Last data filed at 11/23/2021 1322  Gross per 24 hour   Intake 0 ml   Output 800 ml   Net -800 ml     General appearance:  She is an elderly woman who is noted to have some psychomotor slowing. HEENT:  Atraumatic, normocephalic.  No nuchal rigidity. Neck:no adenopathy, no carotid bruit, no JVD, supple, symmetrical, trachea midline and thyroid not enlarged, symmetric, no tenderness/mass/nodules  Lungs: clear to auscultation bilaterally  Heart: regular rate and rhythm, S1, S2 normal, no murmur, click, rub or gallop  Abdomen: soft, non-tender; bowel sounds normal; no masses,  no organomegaly  Extremities: extremities normal, atraumatic, no cyanosis or edema  Neurologic:  Alert, [facility] in [place] and 11/2021.  No dysarthria.  Speech is fluent.  EOMI, PERRL, VFF.  No facial weakness.  Limb strength is diffusely weak. Little asterixis noted today.  No pronator drift.  Rapid alternating movements are normal.  Finger to nose testing shows no dysmetria. Assessment:   1.         Encephalopathy with asterixis and visual hallucinations.  Clinically this looks like a metabolic encephalopathy but there are no significant metabolic derangements.  It is improving.    2.         Hypertension  3.         Hyperlipidemia  4.         CAD  Discussed with the dauther. Plan:   1. As she is gradually improving will hold the course  2. PT/OT  3. Discharge OK from my standpoint. I will arrange a follow up in 2 weeks. Discharge planning:   SNF for rehab    Reviewed treatment plans with the patient and/or family. 25 minutes spent in face to face interaction and coordination of care.      Electronically signed by Mayra Siddiqui MD on 11/23/2021 at 4:47 PM

## 2021-11-23 NOTE — DISCHARGE SUMMARY
Hospitalist   Discharge Summary    Adina Rogel  :  1940  MRN:  [id number]    Admit date:  2021  Discharge date:  2021    Admitting Physician:  No admitting provider for patient encounter. Advance Directive: DNR-CC    Consults: neurology    Primary Care Physician:  Mo Hutchinson    Discharge Diagnoses:  Principal Problem:    Altered mental status  Active Problems:    Secondary hypertension    Coronary artery disease involving native coronary artery of native heart without angina pectoris    Hyperlipidemia    Palliative care patient    Acute encephalopathy    Visual hallucinations    Parkinsonism (HCC)    Generalized weakness    Primary hypertension  Resolved Problems:    * No resolved hospital problems. *      Significant Diagnostic Studies:   XR CHEST (2 VW)    Result Date: 2021  XR CHEST (2 VW) 2021 11:28 AM HISTORY: Altered mental status, positive blood cultures COMPARISON: None. FINDINGS: No lung consolidation. No pleural effusion or pneumothorax. Prior coronary bypass. Heart size is normal. Pulmonary vasculature are nondilated. Slight right hilar prominence appears to be hilar vascular structures. No acute bony abnormality seen. 1. No consolidation/acute infiltrate identified. Prior coronary bypass. Signed by Dr Angelita Batista:    CBC:   Recent Labs     214 21   WBC 6.5 6.4 6.0   HGB 13.3 11.8* 12.5    199 203     BMP:    Recent Labs     214 21  023    136 139   K 3.7 3.9 3.8    102 104   CO2 24 24 22   BUN 26* 33* 35*   CREATININE 1.4* 1.4* 1.4*   GLUCOSE 110* 95 107     Hepatic:   Recent Labs     214 21  0237   AST 16 16 15   ALT 10 9 10   BILITOT 0.5 0.4 0.3   ALKPHOS 79 73 73     Troponin: No results for input(s): TROPONINI in the last 72 hours. BNP: No results for input(s): BNP in the last 72 hours.   Lipids: No results for input(s): CHOL, HDL in the last 72 hours. Invalid input(s): LDLCALCU  INR:   Recent Labs     11/22/21  0344   INR 0.97     ABG: No results for input(s): PHART, CQQ0FHH, PO2ART, HKR1IVR, Z7LFVQSL, BEART in the last 72 hours. 30 Day lookback of cultures:    Blood Culture Recent:   Recent Labs     11/17/21  1602   BC No growth after 5 days of incubation. Gram Stain Recent: No results for input(s): LABGRAM in the last 720 hours. Resp Culture Recent: No results for input(s): CULTRESP in the last 720 hours. Body Fluid Recent : No results for input(s): BFCX in the last 720 hours. MRSA Recent : No results for input(s): [address] in the last 720 hours. Urine Culture Recent :   Recent Labs     11/17/21  1500   LABURIN No growth at 36-48 hours     Organism Recent : No results for input(s): ORG in the last 720 hours. Hospital Course:   79y.o. year old woman with a history of hypertension, hyperlipidemia, and cardiovascular disease who was admitted 11/17 with confusion and hallucinations. Ms. Madonna Elena has resided with her daughter in [place] for the past 2 years. She is independent with personal care. 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 Her daughter has noted some intermittent memory decline for the past two months. For the past 2 weeks, she has had worsened memory, periods of confusion, and visual hallucinations. She sees people but does not elaborate even when asked. She has had poor balance, generalized weakness, difficulty walking, incontinence. She was started on Cymbalta for some depression and the daughter thought that may be to blame. There were no other new medications, no other psychiatric medications. She has had no head injuries, strokes, seizures. Lately, she had developed some jerking in her arms. Her daughter took her to the ER in [place] where labs, UA, and CT head were unremarkable. She was called the following day as a blood culture was growing something.   She was advised to come back to the ER but she instead brought her mother here. UA and blood cultures, CXR were negative. CT head showed age related changes. Encephalopathy with asterixis and visual hallucinations.  Clinically this looks like a metabolic encephalopathy but there are no significant metabolic derangements.  It is some better today      Physical Exam:    Vitals: BP (!) 150/66   Pulse 61   Temp 96.8 °F (36 °C) (Temporal)   Resp 18   Ht 5' 2\" (1.575 m)   Wt 143 lb 1 oz (64.9 kg)   SpO2 96%   BMI 26.17 kg/m²     Physical Exam  Vitals and nursing note reviewed. Constitutional:       Appearance: She is ill-appearing. Comments: More alert  Answers questions. Sitting up in bed   HENT:      Head: Normocephalic. Mouth/Throat:      Mouth: Mucous membranes are moist.   Cardiovascular:      Rate and Rhythm: Normal rate and regular rhythm. Heart sounds: Murmur heard.        Pulmonary:      Breath sounds: Normal breath sounds. Abdominal:      General: Abdomen is flat. Palpations: Abdomen is soft. Skin:     General: Skin is warm. Comments: POOR TURGOR   Neurological:      Comments: More alert  smiles            Discharge Medications:    @DISCHARGEMEDSLIST(<NOROUTINE> error)@    Discharge Instructions: Follow up with Delfina Grant in 5 days. Take medications as directed. Resume activity as tolerated. Diet: ADULT ORAL NUTRITION SUPPLEMENT; Dinner; Standard High Calorie/High Protein Oral Supplement  ADULT DIET; Easy to Chew; Low Fat/Low Chol/High Fiber/YOLY     Disposition: Patient is medically stable and will be discharged to SNF    Time spent on discharge 35 minutes.     Signed:  Tamela Doshi MD  Hospitalist service  11/23/2021  1:12 PM

## 2021-11-24 NOTE — PLAN OF CARE
Problem: Falls - Risk of:  Goal: Will remain free from falls  Description: Will remain free from falls  Outcome: Ongoing  Goal: Absence of physical injury  Description: Absence of physical injury  Outcome: Ongoing     Problem: Skin Integrity:  Goal: Will show no infection signs and symptoms  Description: Will show no infection signs and symptoms  Outcome: Ongoing  Goal: Absence of new skin breakdown  Description: Absence of new skin breakdown  Outcome: Ongoing  Goal: Demonstration of wound healing without infection will improve  Description: Demonstration of wound healing without infection will improve  Outcome: Ongoing  Goal: Complications related to intravenous access or infusion will be avoided or minimized  Description: Complications related to intravenous access or infusion will be avoided or minimized  Outcome: Ongoing     Problem: Confusion - Acute:  Goal: Absence of continued neurological deterioration signs and symptoms  Description: Absence of continued neurological deterioration signs and symptoms  Outcome: Ongoing  Goal: Mental status will be restored to baseline  Description: Mental status will be restored to baseline  Outcome: Ongoing     Problem: Discharge Planning:  Goal: Ability to perform activities of daily living will improve  Description: Ability to perform activities of daily living will improve  Outcome: Ongoing  Goal: Participates in care planning  Description: Participates in care planning  Outcome: Ongoing  Goal: Discharged to appropriate level of care  Description: Discharged to appropriate level of care  Outcome: Ongoing     Problem: Injury - Risk of, Physical Injury:  Goal: Will remain free from falls  Description: Will remain free from falls  Outcome: Ongoing  Goal: Absence of physical injury  Description: Absence of physical injury  Outcome: Ongoing     Problem: Mood - Altered:  Goal: Mood stable  Description: Mood stable  Outcome: Ongoing  Goal: Absence of abusive behavior  Description: Absence of abusive behavior  Outcome: Ongoing  Goal: Verbalizations of feeling emotionally comfortable while being cared for will increase  Description: Verbalizations of feeling emotionally comfortable while being cared for will increase  Outcome: Ongoing     Problem: Psychomotor Activity - Altered:  Goal: Absence of psychomotor disturbance signs and symptoms  Description: Absence of psychomotor disturbance signs and symptoms  Outcome: Ongoing     Problem: Sensory Perception - Impaired:  Goal: Demonstrations of improved sensory functioning will increase  Description: Demonstrations of improved sensory functioning will increase  Outcome: Ongoing  Goal: Decrease in sensory misperception frequency  Description: Decrease in sensory misperception frequency  Outcome: Ongoing  Goal: Able to refrain from responding to false sensory perceptions  Description: Able to refrain from responding to false sensory perceptions  Outcome: Ongoing  Goal: Demonstrates accurate environmental perceptions  Description: Demonstrates accurate environmental perceptions  Outcome: Ongoing  Goal: Able to distinguish between reality-based and nonreality-based thinking  Description: Able to distinguish between reality-based and nonreality-based thinking  Outcome: Ongoing  Goal: Able to interrupt nonreality-based thinking  Description: Able to interrupt nonreality-based thinking  Outcome: Ongoing     Problem: Sleep Pattern Disturbance:  Goal: Appears well-rested  Description: Appears well-rested  Outcome: Ongoing     Problem: Infection:  Goal: Will remain free from infection  Description: Will remain free from infection  Outcome: Ongoing     Problem: Safety:  Goal: Free from accidental physical injury  Description: Free from accidental physical injury  Outcome: Ongoing  Goal: Free from intentional harm  Description: Free from intentional harm  Outcome: Ongoing  Goal: Ability to appropriately use an adaptive device for ambulation will improve  Description: Ability to appropriately use an adaptive device for ambulation will improve  Outcome: Ongoing  Goal: Ability to safely and independently change position in bed will improve  Description: Ability to safely and independently change position in bed will improve  Outcome: Ongoing  Goal: Ability to safely transfer will improve  Description: Ability to safely transfer will improve  Outcome: Ongoing     Problem: Daily Care:  Goal: Daily care needs are met  Description: Daily care needs are met  Outcome: Ongoing     Problem: Pain:  Description: Pain management should include both nonpharmacologic and pharmacologic interventions.   Goal: Patient's pain/discomfort is manageable  Description: Patient's pain/discomfort is manageable  Outcome: Ongoing  Goal: Pain level will decrease  Description: Pain level will decrease  Outcome: Ongoing  Goal: Control of acute pain  Description: Control of acute pain  Outcome: Ongoing  Goal: Control of chronic pain  Description: Control of chronic pain  Outcome: Ongoing     Problem: Skin Integrity:  Goal: Skin integrity will stabilize  Description: Skin integrity will stabilize  Outcome: Ongoing     Problem: Discharge Planning:  Goal: Patients continuum of care needs are met  Description: Patients continuum of care needs are met  Outcome: Ongoing     Problem: Health Behavior:  Goal: Compliance with treatment plan for underlying cause of condition will improve  Description: Compliance with treatment plan for underlying cause of condition will improve  Outcome: Ongoing  Goal: Identification of resources available to assist in meeting health care needs will improve  Description: Identification of resources available to assist in meeting health care needs will improve  Outcome: Ongoing  Goal: Ability to identify changes in lifestyle to reduce recurrence of condition will improve  Description: Ability to identify changes in lifestyle to reduce recurrence of condition will improve  Outcome: Ongoing  Goal: Ability to manage health-related needs will improve  Description: Ability to manage health-related needs will improve  Outcome: Ongoing     Problem: Urinary Elimination:  Goal: Will remain free from infection  Description: Will remain free from infection  Outcome: Ongoing  Goal: Skin integrity will improve  Description: Skin integrity will improve  Outcome: Ongoing  Goal: Ability to recognize the need to void and respond appropriately will improve  Description: Ability to recognize the need to void and respond appropriately will improve  Outcome: Ongoing  Goal: Incidence of incontinence will decrease  Description: Incidence of incontinence will decrease  Outcome: Ongoing     Problem: Fluid Volume:  Goal: Maintenance of adequate hydration will improve  Description: Maintenance of adequate hydration will improve  Outcome: Ongoing     Problem: Nutritional:  Goal: Nutritional status will improve  Description: Nutritional status will improve  Outcome: Ongoing  Goal: Ability to identify appropriate dietary choices will improve  Description: Ability to identify appropriate dietary choices will improve  Outcome: Ongoing  Goal: Ability to chew and swallow food without choking will improve  Outcome: Ongoing  Goal: Ability to achieve adequate nutritional intake will improve  Outcome: Ongoing  Goal: Ability to maintain an optimal weight for height and age will improve  Outcome: Ongoing  Goal: Consumption of the prescribed amount of daily calories will improve  Description: Ability to maintain an optimal weight for height and age will improve  Outcome: Ongoing  Goal: Ability to make healthy dietary choices will improve  Description: Consumption of the prescribed amount of daily calories will improve  Outcome: Ongoing  Goal: Progress toward achieving an optimal weight will improve  Description: Ability to make healthy dietary choices will improve  Outcome: Ongoing     Problem: Physical Regulation:  Goal: Will remain free from infection  Description: Will remain free from infection  Outcome: Ongoing  Goal: Compliance with treatment plan for underlying cause of condition will improve  Description: Compliance with treatment plan for underlying cause of condition will improve  Outcome: Ongoing  Goal: Diagnostic test results will improve  Description: Diagnostic test results will improve  Outcome: Ongoing  Goal: Ability to maintain vital signs within normal range will improve  Description: Ability to maintain vital signs within normal range will improve  Outcome: Ongoing  Goal: Complications related to the disease process, condition or treatment will be avoided or minimized  Description: Complications related to the disease process, condition or treatment will be avoided or minimized  Outcome: Ongoing  Goal: Ability to avoid complications of mobility impairment will improve  Outcome: Ongoing     Problem: Cardiac:  Goal: Diagnostic test results will improve  Description: Diagnostic test results will improve  Outcome: Ongoing  Goal: Complications related to the disease process, condition or treatment will be avoided or minimized  Description: Complications related to the disease process, condition or treatment will be avoided or minimized  Outcome: Ongoing     Problem: Sensory:  Goal: Pain level will decrease  Description: Pain level will decrease  Outcome: Ongoing

## 2021-12-31 ENCOUNTER — APPOINTMENT (OUTPATIENT)
Dept: GENERAL RADIOLOGY | Age: 81
DRG: 291 | End: 2021-12-31
Payer: MEDICARE

## 2021-12-31 ENCOUNTER — HOSPITAL ENCOUNTER (INPATIENT)
Age: 81
LOS: 3 days | Discharge: NURSING, REHAB OR LONG-TERM CARE | DRG: 291 | End: 2022-01-03
Attending: EMERGENCY MEDICINE | Admitting: HOSPITALIST
Payer: MEDICARE

## 2021-12-31 DIAGNOSIS — R07.9 ACUTE CHEST PAIN: Primary | ICD-10-CM

## 2021-12-31 DIAGNOSIS — I50.9 ACUTE CONGESTIVE HEART FAILURE, UNSPECIFIED HEART FAILURE TYPE (HCC): ICD-10-CM

## 2021-12-31 PROBLEM — I50.43 CHF (CONGESTIVE HEART FAILURE), NYHA CLASS I, ACUTE ON CHRONIC, COMBINED (HCC): Status: ACTIVE | Noted: 2021-12-31

## 2021-12-31 LAB
ALBUMIN SERPL-MCNC: 4.1 G/DL (ref 3.5–5.2)
ALP BLD-CCNC: 94 U/L (ref 35–104)
ALT SERPL-CCNC: 11 U/L (ref 5–33)
ANION GAP SERPL CALCULATED.3IONS-SCNC: 12 MMOL/L (ref 7–19)
APTT: 33.4 SEC (ref 26–36.2)
AST SERPL-CCNC: 23 U/L (ref 5–32)
BASOPHILS ABSOLUTE: 0 K/UL (ref 0–0.2)
BASOPHILS RELATIVE PERCENT: 0.4 % (ref 0–1)
BILIRUB SERPL-MCNC: 0.6 MG/DL (ref 0.2–1.2)
BUN BLDV-MCNC: 17 MG/DL (ref 8–23)
CALCIUM SERPL-MCNC: 10.7 MG/DL (ref 8.8–10.2)
CHLORIDE BLD-SCNC: 103 MMOL/L (ref 98–111)
CO2: 24 MMOL/L (ref 22–29)
CREAT SERPL-MCNC: 1 MG/DL (ref 0.5–0.9)
EOSINOPHILS ABSOLUTE: 0.2 K/UL (ref 0–0.6)
EOSINOPHILS RELATIVE PERCENT: 2.2 % (ref 0–5)
GFR AFRICAN AMERICAN: >59
GFR NON-AFRICAN AMERICAN: 53
GLUCOSE BLD-MCNC: 92 MG/DL (ref 74–109)
HCT VFR BLD CALC: 40.8 % (ref 37–47)
HEMOGLOBIN: 12.2 G/DL (ref 12–16)
IMMATURE GRANULOCYTES #: 0.1 K/UL
INR BLD: 1.05 (ref 0.88–1.18)
LYMPHOCYTES ABSOLUTE: 1.4 K/UL (ref 1.1–4.5)
LYMPHOCYTES RELATIVE PERCENT: 17.2 % (ref 20–40)
MCH RBC QN AUTO: 26.1 PG (ref 27–31)
MCHC RBC AUTO-ENTMCNC: 29.9 G/DL (ref 33–37)
MCV RBC AUTO: 87.2 FL (ref 81–99)
MONOCYTES ABSOLUTE: 0.5 K/UL (ref 0–0.9)
MONOCYTES RELATIVE PERCENT: 6.9 % (ref 0–10)
NEUTROPHILS ABSOLUTE: 5.7 K/UL (ref 1.5–7.5)
NEUTROPHILS RELATIVE PERCENT: 72.5 % (ref 50–65)
PDW BLD-RTO: 16.3 % (ref 11.5–14.5)
PLATELET # BLD: 324 K/UL (ref 130–400)
PMV BLD AUTO: 9.9 FL (ref 9.4–12.3)
POTASSIUM SERPL-SCNC: 4.4 MMOL/L (ref 3.5–5)
PRO-BNP: ABNORMAL PG/ML (ref 0–1800)
PROTHROMBIN TIME: 13.9 SEC (ref 12–14.6)
RBC # BLD: 4.68 M/UL (ref 4.2–5.4)
SARS-COV-2, NAAT: NOT DETECTED
SODIUM BLD-SCNC: 139 MMOL/L (ref 136–145)
TOTAL PROTEIN: 8 G/DL (ref 6.6–8.7)
TROPONIN: 0.01 NG/ML (ref 0–0.03)
TROPONIN: <0.01 NG/ML (ref 0–0.03)
WBC # BLD: 7.9 K/UL (ref 4.8–10.8)

## 2021-12-31 PROCEDURE — 80053 COMPREHEN METABOLIC PANEL: CPT

## 2021-12-31 PROCEDURE — 85730 THROMBOPLASTIN TIME PARTIAL: CPT

## 2021-12-31 PROCEDURE — 83880 ASSAY OF NATRIURETIC PEPTIDE: CPT

## 2021-12-31 PROCEDURE — 2580000003 HC RX 258: Performed by: NURSE PRACTITIONER

## 2021-12-31 PROCEDURE — 6370000000 HC RX 637 (ALT 250 FOR IP): Performed by: NURSE PRACTITIONER

## 2021-12-31 PROCEDURE — 99283 EMERGENCY DEPT VISIT LOW MDM: CPT

## 2021-12-31 PROCEDURE — 84484 ASSAY OF TROPONIN QUANT: CPT

## 2021-12-31 PROCEDURE — 85025 COMPLETE CBC W/AUTO DIFF WBC: CPT

## 2021-12-31 PROCEDURE — 2140000000 HC CCU INTERMEDIATE R&B

## 2021-12-31 PROCEDURE — 87635 SARS-COV-2 COVID-19 AMP PRB: CPT

## 2021-12-31 PROCEDURE — 85610 PROTHROMBIN TIME: CPT

## 2021-12-31 PROCEDURE — 6370000000 HC RX 637 (ALT 250 FOR IP): Performed by: HOSPITALIST

## 2021-12-31 PROCEDURE — 6360000002 HC RX W HCPCS: Performed by: EMERGENCY MEDICINE

## 2021-12-31 PROCEDURE — 71045 X-RAY EXAM CHEST 1 VIEW: CPT

## 2021-12-31 PROCEDURE — 36415 COLL VENOUS BLD VENIPUNCTURE: CPT

## 2021-12-31 PROCEDURE — 6360000002 HC RX W HCPCS: Performed by: NURSE PRACTITIONER

## 2021-12-31 RX ORDER — LANOLIN ALCOHOL/MO/W.PET/CERES
400 CREAM (GRAM) TOPICAL 2 TIMES DAILY
Status: DISCONTINUED | OUTPATIENT
Start: 2022-01-01 | End: 2022-01-03 | Stop reason: HOSPADM

## 2021-12-31 RX ORDER — POTASSIUM CHLORIDE 20 MEQ/1
40 TABLET, EXTENDED RELEASE ORAL PRN
Status: DISCONTINUED | OUTPATIENT
Start: 2021-12-31 | End: 2022-01-03 | Stop reason: HOSPADM

## 2021-12-31 RX ORDER — LOSARTAN POTASSIUM 25 MG/1
25 TABLET ORAL DAILY
Status: DISCONTINUED | OUTPATIENT
Start: 2022-01-01 | End: 2022-01-01

## 2021-12-31 RX ORDER — SODIUM CHLORIDE 0.9 % (FLUSH) 0.9 %
5-40 SYRINGE (ML) INJECTION PRN
Status: DISCONTINUED | OUTPATIENT
Start: 2021-12-31 | End: 2022-01-03 | Stop reason: HOSPADM

## 2021-12-31 RX ORDER — METOLAZONE 2.5 MG/1
2.5 TABLET ORAL DAILY
Status: DISCONTINUED | OUTPATIENT
Start: 2021-12-31 | End: 2022-01-01

## 2021-12-31 RX ORDER — ATORVASTATIN CALCIUM 20 MG/1
20 TABLET, FILM COATED ORAL DAILY
Status: DISCONTINUED | OUTPATIENT
Start: 2022-01-01 | End: 2022-01-03 | Stop reason: HOSPADM

## 2021-12-31 RX ORDER — POLYETHYLENE GLYCOL 3350 17 G/17G
17 POWDER, FOR SOLUTION ORAL DAILY PRN
Status: DISCONTINUED | OUTPATIENT
Start: 2021-12-31 | End: 2022-01-03 | Stop reason: HOSPADM

## 2021-12-31 RX ORDER — ASPIRIN 81 MG/1
81 TABLET ORAL DAILY
Status: DISCONTINUED | OUTPATIENT
Start: 2022-01-01 | End: 2022-01-03 | Stop reason: HOSPADM

## 2021-12-31 RX ORDER — FUROSEMIDE 40 MG/1
20 TABLET ORAL 2 TIMES DAILY
Status: DISCONTINUED | OUTPATIENT
Start: 2022-01-01 | End: 2022-01-03

## 2021-12-31 RX ORDER — LEVOTHYROXINE SODIUM 0.1 MG/1
100 TABLET ORAL DAILY
Status: DISCONTINUED | OUTPATIENT
Start: 2022-01-01 | End: 2022-01-01

## 2021-12-31 RX ORDER — AMLODIPINE BESYLATE 5 MG/1
5 TABLET ORAL DAILY
Status: DISCONTINUED | OUTPATIENT
Start: 2021-12-31 | End: 2022-01-01

## 2021-12-31 RX ORDER — ONDANSETRON 2 MG/ML
4 INJECTION INTRAMUSCULAR; INTRAVENOUS EVERY 6 HOURS PRN
Status: DISCONTINUED | OUTPATIENT
Start: 2021-12-31 | End: 2022-01-03 | Stop reason: HOSPADM

## 2021-12-31 RX ORDER — ALLOPURINOL 100 MG/1
100 TABLET ORAL 2 TIMES DAILY
Status: DISCONTINUED | OUTPATIENT
Start: 2021-12-31 | End: 2022-01-03 | Stop reason: HOSPADM

## 2021-12-31 RX ORDER — CHOLECALCIFEROL (VITAMIN D3) 125 MCG
1000 CAPSULE ORAL DAILY
Status: DISCONTINUED | OUTPATIENT
Start: 2022-01-01 | End: 2022-01-03 | Stop reason: HOSPADM

## 2021-12-31 RX ORDER — MAGNESIUM SULFATE IN WATER 40 MG/ML
2000 INJECTION, SOLUTION INTRAVENOUS PRN
Status: DISCONTINUED | OUTPATIENT
Start: 2021-12-31 | End: 2022-01-03 | Stop reason: HOSPADM

## 2021-12-31 RX ORDER — LISINOPRIL 2.5 MG/1
2.5 TABLET ORAL DAILY
Status: DISCONTINUED | OUTPATIENT
Start: 2021-12-31 | End: 2021-12-31

## 2021-12-31 RX ORDER — VITAMIN B COMPLEX
1000 TABLET ORAL DAILY
Status: DISCONTINUED | OUTPATIENT
Start: 2022-01-01 | End: 2022-01-03 | Stop reason: HOSPADM

## 2021-12-31 RX ORDER — TROSPIUM CHLORIDE 20 MG/1
20 TABLET, FILM COATED ORAL NIGHTLY
Status: DISCONTINUED | OUTPATIENT
Start: 2021-12-31 | End: 2022-01-03 | Stop reason: HOSPADM

## 2021-12-31 RX ORDER — FUROSEMIDE 10 MG/ML
40 INJECTION INTRAMUSCULAR; INTRAVENOUS ONCE
Status: COMPLETED | OUTPATIENT
Start: 2021-12-31 | End: 2021-12-31

## 2021-12-31 RX ORDER — SODIUM CHLORIDE 0.9 % (FLUSH) 0.9 %
5-40 SYRINGE (ML) INJECTION EVERY 12 HOURS SCHEDULED
Status: DISCONTINUED | OUTPATIENT
Start: 2021-12-31 | End: 2022-01-03 | Stop reason: HOSPADM

## 2021-12-31 RX ORDER — SODIUM CHLORIDE 9 MG/ML
25 INJECTION, SOLUTION INTRAVENOUS PRN
Status: DISCONTINUED | OUTPATIENT
Start: 2021-12-31 | End: 2022-01-03 | Stop reason: HOSPADM

## 2021-12-31 RX ORDER — POTASSIUM CHLORIDE 7.45 MG/ML
10 INJECTION INTRAVENOUS PRN
Status: DISCONTINUED | OUTPATIENT
Start: 2021-12-31 | End: 2022-01-03 | Stop reason: HOSPADM

## 2021-12-31 RX ORDER — ONDANSETRON 4 MG/1
4 TABLET, ORALLY DISINTEGRATING ORAL EVERY 8 HOURS PRN
Status: DISCONTINUED | OUTPATIENT
Start: 2021-12-31 | End: 2022-01-03 | Stop reason: HOSPADM

## 2021-12-31 RX ADMIN — AMLODIPINE BESYLATE 5 MG: 5 TABLET ORAL at 20:57

## 2021-12-31 RX ADMIN — FUROSEMIDE 40 MG: 10 INJECTION, SOLUTION INTRAMUSCULAR; INTRAVENOUS at 17:40

## 2021-12-31 RX ADMIN — ENOXAPARIN SODIUM 40 MG: 100 INJECTION SUBCUTANEOUS at 20:57

## 2021-12-31 RX ADMIN — SODIUM CHLORIDE, PRESERVATIVE FREE 10 ML: 5 INJECTION INTRAVENOUS at 20:57

## 2021-12-31 RX ADMIN — ALLOPURINOL 100 MG: 100 TABLET ORAL at 20:57

## 2021-12-31 RX ADMIN — TROSPIUM CHLORIDE 20 MG: 20 TABLET, FILM COATED ORAL at 21:03

## 2021-12-31 RX ADMIN — METOLAZONE 2.5 MG: 2.5 TABLET ORAL at 20:57

## 2021-12-31 ASSESSMENT — PAIN DESCRIPTION - PAIN TYPE: TYPE: CHRONIC PAIN

## 2021-12-31 ASSESSMENT — PAIN SCALES - GENERAL: PAINLEVEL_OUTOF10: 3

## 2021-12-31 ASSESSMENT — PAIN DESCRIPTION - LOCATION: LOCATION: NECK

## 2021-12-31 NOTE — H&P
[facility] - History & Physical      PCP: Onur Brush    Date of Admission: 12/31/2021    Date of Service: 12/31/2021    Chief Complaint:  Chest pain and shortness of breath    History Of Present Illness: The patient is a 79 y.o. female who presented to [facility] ED complaining of chest pain and shortness of breath. Pt was discharged from [facility] 2 days ago currently living with her daughter. She has history of CHF, CKD, HTN, parkinsonism and CAD s/p CABG. She is a poor historian and history of present illness is obtained primarily from her daughter. Pt's daughter tells me that mom has had increasing shortness of breath over past couple of days. Pt is said to mostly be in bed or wheelchair able to stand to bedside commode however over past couple of days she has been short of breath getting oob to bedside commode. She has had report of vague chest pain. She has history of CHF per daughter who relates that patient was not continued on diuretic medication at recent discharge from hospital. Pt was discharged 11/17 to nursing home after evaluation of altered mental status, hypertension, visual hallucinations with acute worsening of renal function per daughter. Troponin and EKG were negative for ACS. She was noted to be volume overloaded in the ED and given lasix 40mg IV admitted for further evaluation and treatment.      Past Medical History:        Diagnosis Date    Arthritis     Bladder infection     CAD (coronary artery disease)     Cardiovascular disease     Chronic kidney disease     Colon polyps     Constipation     Gallbladder disease     GERD (gastroesophageal reflux disease)     Gout     Heart attack ([address]     Heart disease     Hyperlipidemia     Hypertension     Incontinence     Thyroid disease        Past Surgical History:        Procedure Laterality Date    CHOLECYSTECTOMY      CORONARY ARTERY BYPASS GRAFT      heart bypass    HYSTERECTOMY Home Medications:  Prior to Admission medications    Medication Sig Start Date End Date Taking? Authorizing Provider   tolterodine (DETROL) 2 MG tablet Take 2 mg by mouth nightly   Yes Historical Provider, MD   allopurinol (ZYLOPRIM) 100 MG tablet Take 100 mg by mouth 2 times daily   Yes Historical Provider, MD   amLODIPine (NORVASC) 5 MG tablet Take 5 mg by mouth daily    Yes Historical Provider, MD   aspirin 81 MG EC tablet Take 81 mg by mouth daily   Yes Historical Provider, MD   atorvastatin (LIPITOR) 20 MG tablet Take 20 mg by mouth daily   Yes Historical Provider, MD   Cyanocobalamin 1000 MCG LOZG Take 1,000 mcg by mouth daily   Yes Historical Provider, MD   levothyroxine (SYNTHROID) 100 MCG tablet Take 100 mcg by mouth Daily   Yes Historical Provider, MD   losartan (COZAAR) 25 MG tablet Take 25 mg by mouth daily   Yes Historical Provider, MD   magnesium oxide (MAG-OX) 400 MG tablet Take 400 mg by mouth 2 times daily    Yes Historical Provider, MD   vitamin D 25 MCG (1000 UT) CAPS Take 1,000 Units by mouth daily    Yes Historical Provider, MD   ammonium lactate (LAC-HYDRIN) 12 % lotion Apply topically as needed for Dry Skin Apply topically as needed. Historical Provider, MD   nitroglycerin (NITRO-BID) 2 % ointment Place 0.5 inches onto the skin as needed     Historical Provider, MD       Allergies:    Patient has no known allergies. Social History:    The patient currently lives daughter  Tobacco:   reports that she has never smoked. She has never used smokeless tobacco.  Alcohol:  Denies  Illicit Drugs: Denies  Family History:      Problem Relation Age of Onset    Heart Attack Father     Cancer Mother        Review of Systems:   Review of Systems   Respiratory: Positive for shortness of breath. Cardiovascular: Positive for chest pain. All other systems reviewed and are negative. 14 point review of systems is negative except as specifically addressed above.     Physical Mediastinal wires appear intact. Right perihilar opacity, unchanged, may reflect prominent pulmonary vasculature. Coarsening of interstitium. Suspected trace right pleural effusion. The bones show no acute pathology. Impression: Cardiomegaly, coarsening of interstitium with trace right pleural effusion. Consider mild/early fluid overload. Signed by Dr Geraldine Bella      Assessment/Plan:  Principal Problem:    CHF (congestive heart failure), NYHA class I, acute on chronic, combined ([address]  Active Problems:    Left bundle branch block    Hyperlipidemia    Essential hypertension    Chest pain in adult  Resolved Problems:    * No resolved hospital problems. *    Principal Problem:  CHF (congestive heart failure), NYHA class I, acute on chronic,    combined ([facility])   -echocardiogram   -add lasix/zaroxolyn/continue ARB   -monitor labs   -monitor vital signs   -strict I's and o's   -daily weight   -monitor for supplemental oxygen requirement    Active Problems:  Chest pain in adult    -consideration for cardiac stress testing   -repeat troponin   -telemetry   -continue aspirin    Coronary artery disease involving native coronary artery of native     heart without angina pectoris/ Left bundle branch block   -noted  Hyperlipidemia   -lipid panel in am   -continue statin medication   Essential hypertension   -continue antihypertensive medictions   -monitor blood pressure     Resolved Problems:    * No resolved hospital problems.  *  Signed:  GIAN Friend - CNP, 12/31/2021 4:11 PM

## 2021-12-31 NOTE — ED PROVIDER NOTES
[facility]  eMERGENCY dEPARTMENT eNCOUnter      Pt Name: Clovia Boeck  MRN: [id number]  [place] 1940  Date of evaluation: 12/31/2021  Provider: Checo Hopkins MD    [address] [place] [address]       Chief Complaint   Patient presents with    Chest Pain    Atrial Fibrillation         HISTORY OF PRESENT ILLNESS   (Location/Symptom, Timing/Onset,Context/Setting, Quality, Duration, Modifying Factors, Severity)  Note limiting factors. Clovia Boeck is a 79 y.o. female who presents to the emergency department for chest pain. Patient has had intermittent central nonradiating chest discomfort for the past several days. Unable to tell me whether this is a sharp or dull. States the pain last different amounts of time but unable to tell me whether minutes seconds or hours. Has known history of coronary disease and had quadruple bypass several years ago per daughter. She recently was discharged from [address] [place] [address] and has been living with the daughter the past couple of days. She has noticed to mild cough. Patient also admits to shortness of breath. Not hypoxic here. No leg swelling. Questionable history of atrial fibrillation per daughter however normal sinus rhythm here. HPI    NursingNotes were reviewed. REVIEW OF SYSTEMS    (2-9 systems for level 4, 10 or more for level 5)     Review of Systems   Constitutional: Negative for chills and fever. HENT: Negative for rhinorrhea and sore throat. Respiratory: Positive for cough and shortness of breath. Cardiovascular: Positive for chest pain. Negative for leg swelling. Gastrointestinal: Negative for abdominal pain, diarrhea, nausea and vomiting. Genitourinary: Negative for dysuria and frequency. Musculoskeletal: Negative for back pain and neck pain. Neurological: Negative for dizziness and headaches. All other systems reviewed and are negative.            PAST MEDICALHISTORY     Past Medical History:   Diagnosis Date    Arthritis     Bladder infection     CAD (coronary artery disease)     Cardiovascular disease     Chronic kidney disease     Colon polyps     Constipation     Gallbladder disease     GERD (gastroesophageal reflux disease)     Gout     [facility]     Heart disease     Hyperlipidemia     Hypertension     Incontinence     Thyroid disease          SURGICAL HISTORY       Past Surgical History:   Procedure Laterality Date    CHOLECYSTECTOMY      CORONARY ARTERY BYPASS GRAFT      heart bypass    HYSTERECTOMY           CURRENT MEDICATIONS     Current Discharge Medication List      CONTINUE these medications which have NOT CHANGED    Details   tolterodine (DETROL) 2 MG tablet Take 2 mg by mouth nightly      allopurinol (ZYLOPRIM) 100 MG tablet Take 100 mg by mouth 2 times daily      amLODIPine (NORVASC) 5 MG tablet Take 5 mg by mouth daily       aspirin 81 MG EC tablet Take 81 mg by mouth daily      atorvastatin (LIPITOR) 20 MG tablet Take 20 mg by mouth daily      Cyanocobalamin 1000 MCG LOZG Take 1,000 mcg by mouth daily      levothyroxine (SYNTHROID) 100 MCG tablet Take 100 mcg by mouth Daily      losartan (COZAAR) 25 MG tablet Take 25 mg by mouth daily      magnesium oxide (MAG-OX) 400 MG tablet Take 400 mg by mouth 2 times daily       vitamin D 25 MCG (1000 UT) CAPS Take 1,000 Units by mouth daily       ammonium lactate (LAC-HYDRIN) 12 % lotion Apply topically as needed for Dry Skin Apply topically as needed. nitroglycerin (NITRO-BID) 2 % ointment Place 0.5 inches onto the skin as needed              ALLERGIES     Patient has no known allergies.     FAMILY HISTORY       Family History   Problem Relation Age of Onset    Heart Attack Father     Cancer Mother           SOCIAL HISTORY       Social History     Socioeconomic History    Marital status:      Spouse name: None    Number of children: None    Years of education: None    Highest education level: None   Occupational History    None Tobacco Use    Smoking status: Never Smoker    Smokeless tobacco: Never Used   Vaping Use    Vaping Use: Never used   Substance and Sexual Activity    Alcohol use: Not Currently    Drug use: Not Currently    Sexual activity: Not Currently   Other Topics Concern    None   Social History Narrative    None     Social Determinants of Health     Financial Resource Strain:     Difficulty of Paying Living Expenses: Not on file   Food Insecurity:     Worried About Running Out of Food in the Last Year: Not on file    Анна of Food in the Last Year: Not on file   Transportation Needs:     Lack of Transportation (Medical): Not on file    Lack of Transportation (Non-Medical):  Not on file   Physical Activity:     Days of Exercise per Week: Not on file    Minutes of Exercise per Session: Not on file   Stress:     Feeling of Stress : Not on file   Social Connections:     Frequency of Communication with Friends and Family: Not on file    Frequency of Social Gatherings with Friends and Family: Not on file    Attends Nondenominational Services: Not on file    Active Member of [address] [place] [address] or Organizations: Not on file    Attends Club or Organization Meetings: Not on file    Marital Status: Not on file   Intimate Partner Violence:     Fear of Current or Ex-Partner: Not on file    Emotionally Abused: Not on file    Physically Abused: Not on file    Sexually Abused: Not on file   Housing Stability:     Unable to Pay for Housing in the Last Year: Not on file    Number of [place] in the Last Year: Not on file    Unstable Housing in the Last Year: Not on file       SCREENINGS    [place] Coma Scale  Eye Opening: Spontaneous  Best Verbal Response: Oriented  Best Motor Response: Obeys commands  [place] Coma Scale Score: 15        PHYSICAL EXAM    (up to 7 for level 4, 8 or more for level 5)     ED Triage Vitals   BP Temp Temp src Pulse Resp SpO2 Height Weight   12/31/21 1414 12/31/21 1406 -- 12/31/21 1414 12/31/21 1414 12/31/21 1414 -- --   (!) 160/77 98.6 °F (37 °C)  65 18 94 %         Physical Exam  Vitals and nursing note reviewed. Constitutional:       General: She is not in acute distress. Appearance: She is well-developed. She is ill-appearing. She is not diaphoretic. HENT:      Head: Normocephalic and atraumatic. Right Ear: External ear normal.      Left Ear: External ear normal.   Eyes:      Conjunctiva/sclera: Conjunctivae normal.   Neck:      Trachea: No tracheal deviation. Cardiovascular:      Rate and Rhythm: Normal rate and regular rhythm. Heart sounds: Normal heart sounds. No murmur heard. Pulmonary:      Effort: No respiratory distress. Breath sounds: Normal breath sounds. No wheezing or rales. Abdominal:      Palpations: Abdomen is soft. There is no mass. Tenderness: There is no abdominal tenderness. Musculoskeletal:         General: Normal range of motion. Cervical back: Normal range of motion. Right lower leg: No edema. Left lower leg: No edema. Skin:     General: Skin is warm and dry. Neurological:      Mental Status: She is alert and oriented to person, place, and time. GCS: GCS eye subscore is 4. GCS verbal subscore is 5. GCS motor subscore is 6. DIAGNOSTIC RESULTS     EKG: All EKG's areinterpreted by the Emergency Department Physician who either signs or Co-signs this chart in the absence of a cardiologist.    70 sinus rhythm left bundle branch block no obvious ST changes nondiagnostic EKG    RADIOLOGY:  Non-plain film images such as CT, Ultrasound and MRI are read by the radiologist. Plain radiographic images are visualized and preliminarily interpreted bythe emergency physician with the below findings:      XR CHEST PORTABLE   Final Result   Impression:   Cardiomegaly, coarsening of interstitium with trace right pleural   effusion. Consider mild/early fluid overload.    Signed by Dr Shazia Juarez:  Camden Malone NATRIURETIC PEPTIDE - Abnormal; Notable for the following components:       Result Value    Pro-BNP 19,899 (*)     All other components within normal limits   CBC WITH AUTO DIFFERENTIAL - Abnormal; Notable for the following components:    MCH 26.1 (*)     MCHC 29.9 (*)     RDW 16.3 (*)     Neutrophils % 72.5 (*)     Lymphocytes % 17.2 (*)     All other components within normal limits   COMPREHENSIVE METABOLIC PANEL - Abnormal; Notable for the following components:    CREATININE 1.0 (*)     GFR Non-African American 53 (*)     Calcium 10.7 (*)     All other components within normal limits   COVID-19, RAPID   APTT   PROTIME-INR   TROPONIN   TROPONIN   LIPID PANEL   CBC WITH AUTO DIFFERENTIAL   COMPREHENSIVE METABOLIC PANEL W/ REFLEX TO MG FOR LOW K       All other labs were within normal range or not returned as of this dictation. EMERGENCY DEPARTMENT COURSE and DIFFERENTIAL DIAGNOSIS/MDM:   Vitals:    Vitals:    12/31/21 1414 12/31/21 1528 12/31/21 1715 12/31/21 1923   BP: (!) 160/77 (!) 143/72 (!) 141/63 134/78   Pulse: 65 74 63 94   Resp: 18 18 18 20   Temp:    96.8 °F (36 °C)   TempSrc:    Temporal   SpO2: 94% 92% 93% 93%   Weight:    159 lb (72.1 kg)       MDM  Number of Diagnoses or Management Options     Amount and/or Complexity of Data Reviewed  Clinical lab tests: ordered and reviewed  Tests in the radiology section of CPT®: ordered and reviewed  Discuss the patient with other providers: yes  Independent visualization of images, tracings, or specimens: yes      Pt prior was on 80 of lasix but had JENNA so this was stopped around thanksgiving now seems overloaded with BNP 19k+ and pulm edema with effusions, first trop neg, cont to trend, IV diuresis, monitor renal fxn closely, d/w dr. Rachel Can for admission      CONSULTS:  IP CONSULT TO HEART FAILURE NURSE/COORDINATOR  IP CONSULT TO DIETITIAN    PROCEDURES:  Unless otherwise noted below, none     Procedures    FINAL IMPRESSION      1. Acute chest pain    2.  Acute

## 2022-01-01 ENCOUNTER — TRANSCRIBE ORDERS (OUTPATIENT)
Dept: PODIATRY | Facility: CLINIC | Age: 82
End: 2022-01-01

## 2022-01-01 DIAGNOSIS — M79.671 BILATERAL FOOT PAIN: Primary | ICD-10-CM

## 2022-01-01 DIAGNOSIS — M79.672 BILATERAL FOOT PAIN: Primary | ICD-10-CM

## 2022-01-01 PROBLEM — E03.9 HYPOTHYROIDISM: Status: ACTIVE | Noted: 2022-01-01

## 2022-01-01 LAB
ALBUMIN SERPL-MCNC: 3.5 G/DL (ref 3.5–5.2)
ALP BLD-CCNC: 83 U/L (ref 35–104)
ALT SERPL-CCNC: 10 U/L (ref 5–33)
ANION GAP SERPL CALCULATED.3IONS-SCNC: 12 MMOL/L (ref 7–19)
AST SERPL-CCNC: 19 U/L (ref 5–32)
BASOPHILS ABSOLUTE: 0 K/UL (ref 0–0.2)
BASOPHILS RELATIVE PERCENT: 0.4 % (ref 0–1)
BILIRUB SERPL-MCNC: 0.5 MG/DL (ref 0.2–1.2)
BUN BLDV-MCNC: 18 MG/DL (ref 8–23)
CALCIUM SERPL-MCNC: 10.5 MG/DL (ref 8.8–10.2)
CHLORIDE BLD-SCNC: 106 MMOL/L (ref 98–111)
CHOLESTEROL, TOTAL: 108 MG/DL (ref 160–199)
CO2: 22 MMOL/L (ref 22–29)
CREAT SERPL-MCNC: 1.1 MG/DL (ref 0.5–0.9)
EOSINOPHILS ABSOLUTE: 0.3 K/UL (ref 0–0.6)
EOSINOPHILS RELATIVE PERCENT: 4.4 % (ref 0–5)
GFR AFRICAN AMERICAN: 57
GFR NON-AFRICAN AMERICAN: 47
GLUCOSE BLD-MCNC: 91 MG/DL (ref 74–109)
HCT VFR BLD CALC: 39.3 % (ref 37–47)
HDLC SERPL-MCNC: 36 MG/DL (ref 65–121)
HEMOGLOBIN: 12.2 G/DL (ref 12–16)
IMMATURE GRANULOCYTES #: 0.1 K/UL
LDL CHOLESTEROL CALCULATED: 58 MG/DL
LV EF: 28 %
LVEF MODALITY: NORMAL
LYMPHOCYTES ABSOLUTE: 1.6 K/UL (ref 1.1–4.5)
LYMPHOCYTES RELATIVE PERCENT: 23.9 % (ref 20–40)
MAGNESIUM: 2.3 MG/DL (ref 1.6–2.4)
MCH RBC QN AUTO: 27 PG (ref 27–31)
MCHC RBC AUTO-ENTMCNC: 31 G/DL (ref 33–37)
MCV RBC AUTO: 86.9 FL (ref 81–99)
MONOCYTES ABSOLUTE: 0.6 K/UL (ref 0–0.9)
MONOCYTES RELATIVE PERCENT: 8.1 % (ref 0–10)
NEUTROPHILS ABSOLUTE: 4.3 K/UL (ref 1.5–7.5)
NEUTROPHILS RELATIVE PERCENT: 62.2 % (ref 50–65)
PDW BLD-RTO: 16.5 % (ref 11.5–14.5)
PLATELET # BLD: 327 K/UL (ref 130–400)
PMV BLD AUTO: 10 FL (ref 9.4–12.3)
POTASSIUM REFLEX MAGNESIUM: 4.2 MMOL/L (ref 3.5–5)
RBC # BLD: 4.52 M/UL (ref 4.2–5.4)
SODIUM BLD-SCNC: 140 MMOL/L (ref 136–145)
T4 FREE: 1 NG/DL (ref 0.93–1.7)
TOTAL PROTEIN: 6.5 G/DL (ref 6.6–8.7)
TRIGL SERPL-MCNC: 70 MG/DL (ref 0–149)
TROPONIN: 0.01 NG/ML (ref 0–0.03)
TSH SERPL DL<=0.05 MIU/L-ACNC: 53.95 UIU/ML (ref 0.27–4.2)
VITAMIN D 25-HYDROXY: 40.9 NG/ML
WBC # BLD: 6.8 K/UL (ref 4.8–10.8)

## 2022-01-01 PROCEDURE — 99222 1ST HOSP IP/OBS MODERATE 55: CPT | Performed by: INTERNAL MEDICINE

## 2022-01-01 PROCEDURE — 6370000000 HC RX 637 (ALT 250 FOR IP): Performed by: INTERNAL MEDICINE

## 2022-01-01 PROCEDURE — 6370000000 HC RX 637 (ALT 250 FOR IP): Performed by: NURSE PRACTITIONER

## 2022-01-01 PROCEDURE — 82306 VITAMIN D 25 HYDROXY: CPT

## 2022-01-01 PROCEDURE — 2140000000 HC CCU INTERMEDIATE R&B

## 2022-01-01 PROCEDURE — 80053 COMPREHEN METABOLIC PANEL: CPT

## 2022-01-01 PROCEDURE — 85025 COMPLETE CBC W/AUTO DIFF WBC: CPT

## 2022-01-01 PROCEDURE — 6360000002 HC RX W HCPCS: Performed by: INTERNAL MEDICINE

## 2022-01-01 PROCEDURE — 93005 ELECTROCARDIOGRAM TRACING: CPT | Performed by: INTERNAL MEDICINE

## 2022-01-01 PROCEDURE — 84439 ASSAY OF FREE THYROXINE: CPT

## 2022-01-01 PROCEDURE — 6370000000 HC RX 637 (ALT 250 FOR IP): Performed by: HOSPITALIST

## 2022-01-01 PROCEDURE — 2580000003 HC RX 258: Performed by: NURSE PRACTITIONER

## 2022-01-01 PROCEDURE — 84484 ASSAY OF TROPONIN QUANT: CPT

## 2022-01-01 PROCEDURE — 36415 COLL VENOUS BLD VENIPUNCTURE: CPT

## 2022-01-01 PROCEDURE — 80061 LIPID PANEL: CPT

## 2022-01-01 PROCEDURE — 84443 ASSAY THYROID STIM HORMONE: CPT

## 2022-01-01 PROCEDURE — 6360000002 HC RX W HCPCS: Performed by: NURSE PRACTITIONER

## 2022-01-01 PROCEDURE — 83735 ASSAY OF MAGNESIUM: CPT

## 2022-01-01 PROCEDURE — 93306 TTE W/DOPPLER COMPLETE: CPT

## 2022-01-01 PROCEDURE — 2500000003 HC RX 250 WO HCPCS: Performed by: HOSPITALIST

## 2022-01-01 RX ORDER — LEVOTHYROXINE SODIUM 0.12 MG/1
125 TABLET ORAL DAILY
Status: DISCONTINUED | OUTPATIENT
Start: 2022-01-02 | End: 2022-01-03 | Stop reason: HOSPADM

## 2022-01-01 RX ORDER — ACETAMINOPHEN 325 MG/1
650 TABLET ORAL EVERY 4 HOURS PRN
Status: DISCONTINUED | OUTPATIENT
Start: 2022-01-01 | End: 2022-01-03 | Stop reason: HOSPADM

## 2022-01-01 RX ORDER — LEVOTHYROXINE SODIUM ANHYDROUS 100 UG/5ML
50 INJECTION, POWDER, LYOPHILIZED, FOR SOLUTION INTRAVENOUS ONCE
Status: COMPLETED | OUTPATIENT
Start: 2022-01-01 | End: 2022-01-01

## 2022-01-01 RX ORDER — SPIRONOLACTONE 25 MG/1
25 TABLET ORAL DAILY
Status: DISCONTINUED | OUTPATIENT
Start: 2022-01-01 | End: 2022-01-03

## 2022-01-01 RX ORDER — CARVEDILOL 6.25 MG/1
6.25 TABLET ORAL 2 TIMES DAILY WITH MEALS
Status: DISCONTINUED | OUTPATIENT
Start: 2022-01-01 | End: 2022-01-02

## 2022-01-01 RX ADMIN — ACETAMINOPHEN 650 MG: 325 TABLET ORAL at 17:13

## 2022-01-01 RX ADMIN — ENOXAPARIN SODIUM 40 MG: 100 INJECTION SUBCUTANEOUS at 12:30

## 2022-01-01 RX ADMIN — LOSARTAN POTASSIUM 25 MG: 25 TABLET, FILM COATED ORAL at 13:33

## 2022-01-01 RX ADMIN — AMLODIPINE BESYLATE 5 MG: 5 TABLET ORAL at 13:33

## 2022-01-01 RX ADMIN — ACETAMINOPHEN 650 MG: 325 TABLET ORAL at 20:47

## 2022-01-01 RX ADMIN — METOLAZONE 2.5 MG: 2.5 TABLET ORAL at 13:33

## 2022-01-01 RX ADMIN — ALLOPURINOL 100 MG: 100 TABLET ORAL at 20:47

## 2022-01-01 RX ADMIN — SODIUM CHLORIDE, PRESERVATIVE FREE 10 ML: 5 INJECTION INTRAVENOUS at 20:48

## 2022-01-01 RX ADMIN — LEVOTHYROXINE SODIUM ANHYDROUS 50 MCG: 100 INJECTION, POWDER, LYOPHILIZED, FOR SOLUTION INTRAVENOUS at 12:21

## 2022-01-01 RX ADMIN — CYANOCOBALAMIN TAB 500 MCG 1000 MCG: 500 TAB at 13:33

## 2022-01-01 RX ADMIN — Medication 400 MG: at 20:47

## 2022-01-01 RX ADMIN — FUROSEMIDE 20 MG: 40 TABLET ORAL at 17:11

## 2022-01-01 RX ADMIN — ASPIRIN 81 MG: 81 TABLET, COATED ORAL at 13:32

## 2022-01-01 RX ADMIN — ATORVASTATIN CALCIUM 20 MG: 20 TABLET, FILM COATED ORAL at 13:32

## 2022-01-01 RX ADMIN — LEVOTHYROXINE SODIUM 100 MCG: 100 TABLET ORAL at 05:43

## 2022-01-01 RX ADMIN — Medication 1000 UNITS: at 13:32

## 2022-01-01 RX ADMIN — SODIUM CHLORIDE, PRESERVATIVE FREE 10 ML: 5 INJECTION INTRAVENOUS at 12:21

## 2022-01-01 RX ADMIN — CARVEDILOL 6.25 MG: 6.25 TABLET, FILM COATED ORAL at 17:13

## 2022-01-01 RX ADMIN — ENOXAPARIN SODIUM 60 MG: 60 INJECTION SUBCUTANEOUS at 20:47

## 2022-01-01 RX ADMIN — SACUBITRIL AND VALSARTAN 1 TABLET: 24; 26 TABLET, FILM COATED ORAL at 20:47

## 2022-01-01 RX ADMIN — SPIRONOLACTONE 25 MG: 25 TABLET ORAL at 17:13

## 2022-01-01 RX ADMIN — TROSPIUM CHLORIDE 20 MG: 20 TABLET, FILM COATED ORAL at 20:47

## 2022-01-01 ASSESSMENT — PAIN DESCRIPTION - PAIN TYPE
TYPE: CHRONIC PAIN
TYPE: CHRONIC PAIN

## 2022-01-01 ASSESSMENT — ENCOUNTER SYMPTOMS
CHEST TIGHTNESS: 1
WHEEZING: 0
ABDOMINAL DISTENTION: 0
DIARRHEA: 0
EYE DISCHARGE: 0
CONSTIPATION: 0
SHORTNESS OF BREATH: 0
BACK PAIN: 0
COUGH: 0
BLOOD IN STOOL: 0
VOMITING: 0

## 2022-01-01 ASSESSMENT — PAIN SCALES - GENERAL
PAINLEVEL_OUTOF10: 5
PAINLEVEL_OUTOF10: 8
PAINLEVEL_OUTOF10: 3

## 2022-01-01 ASSESSMENT — PAIN DESCRIPTION - LOCATION
LOCATION: NECK
LOCATION: GENERALIZED

## 2022-01-01 NOTE — PROGRESS NOTES
4 Eyes Skin Assessment    Zen Jacob is being assessed upon: Admission    I agree that I, Elissa Block RN, along with *** (either 2 RN's or 1 LPN and 1 RN) have performed a thorough Head to Toe Skin Assessment on the patient. ALL assessment sites listed below have been assessed. Areas assessed by both nurses:     [x]   Head, Face, and Ears   [x]   Shoulders, Back, and Chest  [x]   Arms, Elbows, and Hands   [x]   Coccyx, Sacrum, and Ischium  [x]   Legs, Feet, and Heels    Does the Patient have Skin Breakdown?  Yes, redness on sacrum and heels    Zev Prevention initiated: Yes  Wound Care Orders initiated: No    [facility] nurse consulted for Pressure Injury (Stage 3,4, Unstageable, DTI, NWPT, and Complex wounds) and New or Established Ostomies: No        Primary Nurse eSignature: Elissa Block RN on 1/1/2022 at 3:42 AM      Co-Signer eSignature: {Esignature:[id number]}

## 2022-01-01 NOTE — PROGRESS NOTES
Melita Jacobo arrived to room # [phone]. Presented with: CHF  Mental Status: Patient is oriented, alert, coherent, logical, thought processes intact and able to concentrate and follow conversation. Vitals:    01/01/22 0021   BP: 115/74   Pulse: 91   Resp: 20   Temp: 97.2 °F (36.2 °C)   SpO2: 93%     Patient safety contract and falls prevention contract reviewed with patient No.  Oriented Patient to room. Call light within reach. Yes.   Needs, issues or concerns expressed at this time: no.      Electronically signed by Emilie Vergara RN on 1/1/2022 at 3:37 AM 1

## 2022-01-01 NOTE — CONSULTS
[facility]  Cardiology Consult      Requesting MD:  Rohan Iverson MD   Admit Status:  Inpatient [101]       History obtained from:   ? Patient  ? Other (specify):     PROBLEM LIST:    Patient Active Problem List    Diagnosis Date Noted    Hypothyroidism 01/01/2022     Priority: Low    CHF (congestive heart failure), NYHA class I, acute on chronic, combined ([address] 12/31/2021     Priority: Low    Chest pain in adult 12/31/2021     Priority: Low    Acute encephalopathy      Priority: Low    Visual hallucinations      Priority: Low    Parkinsonism (HCC)      Priority: Low    Generalized weakness      Priority: Low    Essential hypertension      Priority: Low    Palliative care patient 11/18/2021     Priority: Low    Altered mental status 11/17/2021     Priority: Low    Coronary artery disease involving native coronary artery of native heart without angina pectoris 03/08/2021     Priority: Low     Overview Note:     CABG x4, 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  Left bundle branch block 03/08/2021     Priority: Low     Overview Note:     Chronic      Benign hypertension 03/08/2021     Priority: Low    Hyperlipidemia 03/08/2021     Priority: Low    Secondary hypertension 02/08/2021     Priority: Low     1. Coronary artery disease, prior CABG with four-vessel grafting 8/2017 [place], [place]) with LIMA sequential graft to diagonal and LAD, SVG sequential graft to OM1 and RCA, prior ejection fraction 45 to 50%, ejection fraction 25 to 30%, left bundle branch block. 2.  Paroxysmal atrial fibrillation not on anticoagulation. 3.  Probable early dementia. PRESENTATION: Doug Delvalle is a 79y.o. year old female who presents with complaints of chest pain that is intermittent and ongoing for the past few days. History difficult to elicit from patient. Currently lying flat with no shortness of breath. Noted to have atrial fibrillation with episodes overnight and currently in A. fib since 5 AM.  Was admitted in sinus rhythm. Troponins negative. proBNP elevated at 19,899. EKG with sinus rhythm/left bundle branch block. Telemetry shows atrial fibrillation. Patient recently admitted 11/17/2021 with confusion/hallucinations with presumed early dementia with subsequently improved with possible encephalopathy of unclear etiology. REVIEW OF SYSTEMS:  Review of Systems   Constitutional: Negative for activity change, fatigue and fever. HENT: Negative for ear pain, hearing loss and tinnitus. Eyes: Negative for discharge and visual disturbance. Respiratory: Positive for chest tightness. Negative for cough, shortness of breath and wheezing. Cardiovascular: Negative for chest pain, palpitations and leg swelling. Gastrointestinal: Negative for abdominal distention, blood in stool, constipation, diarrhea and vomiting. Endocrine: Negative for cold intolerance, heat intolerance, polydipsia and polyuria. Genitourinary: Negative for dysuria and hematuria. Musculoskeletal: Negative for arthralgias, back pain and myalgias. Skin: Negative for pallor and rash. Neurological: Negative for seizures, syncope, weakness and headaches. Psychiatric/Behavioral: Negative for behavioral problems and dysphoric mood. Past Medical History:      Diagnosis Date    Arthritis     Bladder infection     CAD (coronary artery disease)     Cardiovascular disease     Chronic kidney disease     Colon polyps     Constipation     Gallbladder disease     GERD (gastroesophageal reflux disease)     Gout     [facility] [address] [address]     Heart disease     Hyperlipidemia     Hypertension     Incontinence     Thyroid disease        Past Surgical History:      Procedure Laterality Date    CHOLECYSTECTOMY      CORONARY ARTERY BYPASS GRAFT      heart bypass    HYSTERECTOMY         Allergies:  Patient has no known allergies.     Past Social History:  Social History     Socioeconomic History    Marital status:      Spouse name: Not on file    Number of children: Not on file    Years of education: Not on file    Highest education level: Not on file   Occupational History    Not on file   Tobacco Use    Smoking status: Never Smoker    Smokeless tobacco: Never Used   Vaping Use    Vaping Use: Never used   Substance and Sexual Activity    Alcohol use: Not Currently    Drug use: Not Currently    Sexual activity: Not Currently   Other Topics Concern    Not on file   Social History Narrative    Not on file     Social Determinants of Health     Financial Resource Strain:     Difficulty of Paying Living Expenses: Not on file   Food Insecurity:     Worried About Running Out of Food in the Last Year: Not on file    Анна of Food in the Last Year: Not on file   Transportation Needs:     Lack of Transportation (Medical): Not on file    Lack of Transportation (Non-Medical):  Not on file   Physical Activity:     Days of Exercise per Week: Not on file    Minutes of Exercise per Session: Not on file   Stress:     Feeling of Stress : Not on file   Social Connections:     Frequency of Communication with Friends and Family: Not on file    Frequency of Social Gatherings with Friends and Family: Not on file    Attends [employer] Services: Not on file    Active Member of [address] [place] [address] [employer] or Organizations: Not on file    Attends Club or Organization Meetings: Not on file    Marital Status: Not on file   Intimate Partner Violence:     Fear of Current or Ex-Partner: Not on file    Emotionally Abused: Not on file    Physically Abused: Not on file    Sexually Abused: Not on file   Housing Stability:     Unable to Pay for Housing in the Last Year: Not on file    Number of [place] in the Last Year: Not on file    Unstable Housing in the Last Year: Not on file       Family History:       Problem Relation Age of Onset    Heart Attack Father     Cancer Mother        Home Meds:  Prior to Admission medications    Medication Sig Start Date End Date Taking? Authorizing Provider   tolterodine (DETROL) 2 MG tablet Take 2 mg by mouth nightly   Yes Historical Provider, MD   allopurinol (ZYLOPRIM) 100 MG tablet Take 100 mg by mouth 2 times daily   Yes Historical Provider, MD   amLODIPine (NORVASC) 5 MG tablet Take 5 mg by mouth daily    Yes Historical Provider, MD   aspirin 81 MG EC tablet Take 81 mg by mouth daily   Yes Historical Provider, MD   atorvastatin (LIPITOR) 20 MG tablet Take 20 mg by mouth daily   Yes Historical Provider, MD   Cyanocobalamin 1000 MCG LOZG Take 1,000 mcg by mouth daily   Yes Historical Provider, MD   levothyroxine (SYNTHROID) 100 MCG tablet Take 100 mcg by mouth Daily   Yes Historical Provider, MD   losartan (COZAAR) 25 MG tablet Take 25 mg by mouth daily   Yes Historical Provider, MD   magnesium oxide (MAG-OX) 400 MG tablet Take 400 mg by mouth 2 times daily    Yes Historical Provider, MD   vitamin D 25 MCG (1000 UT) CAPS Take 1,000 Units by mouth daily    Yes Historical Provider, MD   ammonium lactate (LAC-HYDRIN) 12 % lotion Apply topically as needed for Dry Skin Apply topically as needed.     Historical Provider, MD   nitroglycerin (NITRO-BID) 2 % ointment Place 0.5 inches onto the skin as needed     Historical Provider, MD       Current Meds:   enoxaparin  60 mg SubCUTAneous BID    [START ON 1/2/2022] levothyroxine  125 mcg Oral Daily    sacubitril-valsartan  1 tablet Oral BID    carvedilol  6.25 mg Oral BID WC    spironolactone  25 mg Oral Daily    sodium chloride flush  5-40 mL IntraVENous 2 times per day    allopurinol  100 mg Oral BID    aspirin  81 mg Oral Daily    atorvastatin  20 mg Oral Daily    vitamin B-12  1,000 mcg Oral Daily    magnesium oxide  400 mg Oral BID    trospium  20 mg Oral Nightly    Vitamin D  1,000 Units Oral Daily    furosemide  20 mg Oral BID       Current Infused Meds:   sodium chloride         Physical Exam:  Vitals:    01/01/22 1210   BP: place, and time. Cranial Nerves: No cranial nerve deficit. Deep Tendon Reflexes: Reflexes normal.           Labs:  Recent Labs     12/31/21  1435 01/01/22  0256   WBC 7.9 6.8   HGB 12.2 12.2    327       Recent Labs     12/31/21  1435 01/01/22  0256    140   K 4.4 4.2    106   CO2 24 22   BUN 17 18   CREATININE 1.0* 1.1*   LABGLOM 53* 47*   MG  --  2.3   CALCIUM 10.7* 10.5*       CK, CKMB, Troponin: @LABRCNT (CKTOTAL:3, CKMB:3, TROPONINI:3)@    Last 3 BNP:          IMAGING:  ECHO Complete 2D W Doppler W Color    Result Date: 1/1/2022  Transthoracic Echocardiography Report (TTE)  Demographics   Patient Name   Sandie Code  Date of Study            01/01/2022   MRN            [id number]          Gender                   Female   Date of Birth  1940      Room Number              ZMG-0917   Age            79 year(s)   Height:        62 inches       Referring Physician      Idania Love   Weight:        156 pounds      Sonographer              Seferino Holloway   BSA:           1.72 m^2        Interpreting Physician   Silvana Woods MD   BMI:           28.53 kg/m^2  Procedure Type of Study   TTE procedure:ECHO NO CONTRAST WITH DOP/COLR. Study Location: Echo Lab Technical Quality: Good visualization Patient Status: Inpatient BP: 129/94 mmHg Indications:Congestive heart failure and Chest pain. Conclusions   Summary  LV appears borderline dilated with severely reduced LV systolic function. LV ejection fraction estimated at 25 to 30%. Probable diastolic dysfunction but difficult to assess accurately due to  rhythm. Mild concentric LVH. RV appears normal in size with preserved systolic function. Mild left atrial enlargement. Normal right atrial size. Aortic valve appears trileaflet with moderate leaflet thickening but  preserved mobility. Mild aortic regurgitation. No stenosis. Mitral valve leaflets appear mildly thickened with mildly reduced leaflet  mobility. No stenosis.  Mild mitral regurgitation. Mild tricuspid regurgitation. Mild pulmonic regurgitation. No significant pericardial effusion. Signature   ----------------------------------------------------------------  Electronically signed by lAexandra Woods MD(Interpreting physician)  on 01/01/2022 04:40 PM  ----------------------------------------------------------------  M-Mode Measurements (cm)   LVIDd: 4.52 cm                         LVIDs: 3.74 cm  IVSd: 0.94 cm  LVPWd: 0.96 cm                         AO Root Dimension: 2 cm  % Ejection Fraction: 27 %              LA: 4.6 cm                                         LVOT: 2.4 cm  Doppler Measurements:   AV Peak Velocity:169 cm/s            MV Peak E-Wave: 94.7 cm/s  AV Peak Gradient: 11.42 mmHg         MV Peak A-Wave: 96 cm/s  AV Mean Gradient: 5 mmHg             MV E/A Ratio: 0.99 %  AV Area (Continuity):2.06 cm^2       MV Peak Gradient: 3.59 mmHg  TR Velocity:219 cm/s                 MV P1/2t: 153 msec  TR Gradient:19.18 mmHg               MVA by PHT1.44 cm^2  Estimated RAP:3 mmHg  RVSP:28 mmHg      XR CHEST PORTABLE    Result Date: 12/31/2021  Exam:   XR CHEST PORTABLE  Date:  12/31/2021 History:  Female, age  79 years; chest pain COMPARISON:  Chest x-ray dated November 17, 2021. Findings : Moderate cardiomegaly. Mediastinal wires appear intact. Right perihilar opacity, unchanged, may reflect prominent pulmonary vasculature. Coarsening of interstitium. Suspected trace right pleural effusion. The bones show no acute pathology. Impression: Cardiomegaly, coarsening of interstitium with trace right pleural effusion. Consider mild/early fluid overload. Signed by Dr Sarai Fischer and Plan:     This is a 79y.o. year old female with past medical history of coronary artery disease, prior four-vessel CABG 8/2017, prior ejection fraction 45 to 50%, possible early dementia/anxiety disorder, admitted with chest pain, negative troponins, echo with LV systolic function significantly depressed at 25 to 30% with paroxysmal atrial fibrillation, rate controlled. 1.  No overt signs of volume overload though elevated proBNP. Change Lovenox to 60 mg subcu twice daily. Switch losartan to Entresto 24/26 mg twice daily. Discontinue amlodipine and switch to Coreg 6.25 mg twice daily. Add Aldactone 25 mg once daily. Would be advantageous to maintain sinus rhythm. Noted significant left bundle branch block. We will hold off antiarrhythmic therapy at this time and manage heart failure. 2.  We will need to rule out ischemic etiology of depressed EF versus atrial fibrillation tachycardia related from atrial fibrillation. Patient does not wish to make any decisions and will need to speak to daughter. Troponins have been negative. Will obtain a Lexiscan study tomorrow.       Electronically signed by Darlene Larkin MD on 1/1/2022 at 4:43 PM

## 2022-01-01 NOTE — PROGRESS NOTES
Patient has requested to hold off on po medication until she is allowed to eat food. She is concerned about stomach upset.    Electronically signed by Bryant Tirado RN on 1/1/2022 at 11:10 AM

## 2022-01-01 NOTE — PROGRESS NOTES
[place], [place], [address]    DEPARTMENT OF HOSPITALIST MEDICINE      PROGRESS  NOTE:    NOTE: Portions of this note have been copied forward, however, changed to reflect the most current clinical status of this patient. Patient:  Laurence Reza  YOB: 1940  Date of Service: 1/1/2022  MRN: [id number]   Acct: [de-identified]   Primary Care Physician: Prabhjot Aranda  Advance Directive: DNR-CC  Admit Date: 12/31/2021       Hospital Day: 1      CHIEF COMPLAINT:  Chief Complaint   Patient presents with    Chest Pain    Atrial Fibrillation       SUBJECTIVE:  Patient seen and evaluated at bedside during my morning rounds. She was on her way to get her echo done. Is having a little stomach upset and wants to hold off on taking her oral meds. [address]  COURSE:    01/01/2022  Patient is diuresing well on gentle IV diuretics. 2D echo is being done today which will be followed. Patient's TSH level was significantly high at 53.95 hence a free T4 level was done which was borderline at 1. I ordered 50 mcg of levothyroxine IV x1 dose and increase her Synthroid dosing to 125 mg orally daily. She is advised to take her thyroid medication first thing in the morning and wait for least 1 hour before taking any other medications or food, for maximum benefits. Awaiting cardiology evaluation. 12/31/2021  History Of Present Illness: The patient is a 79 y.o. female who presented to [facility] ED complaining of chest pain and shortness of breath. Pt was discharged from [facility] 2 days ago currently living with her daughter. She has history of CHF, CKD, HTN, parkinsonism and CAD s/p CABG. She is a poor historian and history of present illness is obtained primarily from her daughter. Pt's daughter tells me that mom has had increasing shortness of breath over past couple of days.  Pt is said to mostly be in bed or wheelchair able to stand to bedside commode however over past couple of days she has been short of breath getting oob to bedside commode. She has had report of vague chest pain. She has history of CHF per daughter who relates that patient was not continued on diuretic medication at recent discharge from hospital. Pt was discharged 11/17 to nursing home after evaluation of altered mental status, hypertension, visual hallucinations with acute worsening of renal function per daughter. Troponin and EKG were negative for ACS. She was noted to be volume overloaded in the ED and given lasix 40mg IV admitted for further evaluation and treatment.      REVIEW  OF  SYSTEMS:    Constitutional:  No fevers, chills, nausea, vomiting, + tiredness and fatigue   Lungs:   No hemoptysis, pleurisy, cough, SOB   Heart:  No chest pressure with exertion, palpitations,    Abdomen:   No new masses, no bright red blood per rectum   Extremities:  + difficulty ambulation due to weakness, + lower leg swelling   Neurologic: No new motor or sensory changes     14 point review of systems addressed with patient which is essentially negative except as specifically addressed above:    PAST MEDICAL HISTORY:  Past Medical History:   Diagnosis Date    Arthritis     Bladder infection     CAD (coronary artery disease)     Cardiovascular disease     Chronic kidney disease     Colon polyps     Constipation     Gallbladder disease     GERD (gastroesophageal reflux disease)     Gout     [facility] [address] [address]     Heart disease     Hyperlipidemia     Hypertension     Incontinence     Thyroid disease          PAST SURGICAL HISTORY:  Past Surgical History:   Procedure Laterality Date    CHOLECYSTECTOMY      CORONARY ARTERY BYPASS GRAFT      heart bypass    HYSTERECTOMY          FAMILY HISTORY:  Family History   Problem Relation Age of Onset    Heart Attack Father     Cancer Mother            OBJECTIVE:  /76   Pulse 94   Temp 98.2 °F (36.8 °C) (Temporal)   Resp 16   Wt 156 lb 5 oz (70.9 kg)   SpO2 93%   BMI 28.59 kg/m²   No intake/output data recorded.     PHYSICAL  EXAMINATION:    KELLY:  Awake, alert, oriented x 3, patient appears tired and fatigued   Head/Eyes:  Normocephalic, atraumatic, EOMI and PERRLA bilaterally   Respiratory:   Bilateral decreased air entry in both lung fields, mild B/L crackles, scattered bilateral rales   Cardiovascular:  Regular rate and rhythm, S1+S2+0, no murmurs/rubs   Abdomen:   Soft, non-tender, bowel sounds +ve, no organomegaly   Extremities: Moves all, decreased range of motion, +1 bilateral lower leg edema   Neurologic: Awake, alert, oriented x 3, cranial nerves II-XII intact, no focal neurological deficits, sensory system intact   Psychiatric: Normal mood, non-suicidal       CURRENT MEDICATIONS:  Scheduled:   enoxaparin  60 mg SubCUTAneous BID    [START ON 1/2/2022] levothyroxine  125 mcg Oral Daily    metOLazone  2.5 mg Oral Daily    sodium chloride flush  5-40 mL IntraVENous 2 times per day    allopurinol  100 mg Oral BID    amLODIPine  5 mg Oral Daily    aspirin  81 mg Oral Daily    atorvastatin  20 mg Oral Daily    vitamin B-12  1,000 mcg Oral Daily    losartan  25 mg Oral Daily    magnesium oxide  400 mg Oral BID    trospium  20 mg Oral Nightly    Vitamin D  1,000 Units Oral Daily    furosemide  20 mg Oral BID        PRN:  sodium chloride flush, 5-40 mL, PRN  sodium chloride, 25 mL, PRN  ondansetron, 4 mg, Q8H PRN   Or  ondansetron, 4 mg, Q6H PRN  polyethylene glycol, 17 g, Daily PRN  magnesium sulfate, 2,000 mg, PRN  potassium chloride, 40 mEq, PRN   Or  potassium alternative oral replacement, 40 mEq, PRN   Or  potassium chloride, 10 mEq, PRN        Infusions:   sodium chloride         Laboratory Data:  Recent Labs     12/31/21  1435 01/01/22  0256   WBC 7.9 6.8   HGB 12.2 12.2    327     Recent Labs     12/31/21  1435 01/01/22  0256    140   K 4.4 4.2    106   CO2 24 22   BUN 17 18   CREATININE 1.0* 1.1*   GLUCOSE 92 91     Recent Labs     12/31/21  1435 01/01/22  0256   AST 23 19   ALT 11 10   BILITOT 0.6 0.5   ALKPHOS 94 83     Troponin T:   Recent Labs     12/31/21  1435 12/31/21  2048 01/01/22  0942   TROPONINI 0.01 <0.01 0.01     Pro-BNP: No results for input(s): BNP in the last 72 hours. INR:   Recent Labs     12/31/21  1435   INR 1.05     UA:No results for input(s): NITRITE, COLORU, PHUR, LABCAST, WBCUA, RBCUA, MUCUS, TRICHOMONAS, YEAST, BACTERIA, CLARITYU, SPECGRAV, LEUKOCYTESUR, UROBILINOGEN, BILIRUBINUR, BLOODU, GLUCOSEU, AMORPHOUS in the last 72 hours. Invalid input(s): Ardine Dear  A1C: No results for input(s): LABA1C in the last 72 hours. ABG:No results for input(s): PHART, IIN8TVA, PO2ART, SBY8GUF, BEART, HGBAE, L1GACDZG, CARBOXHGBART in the last 72 hours. Imaging:    XR CHEST PORTABLE    Result Date: 12/31/2021  Impression: Cardiomegaly, coarsening of interstitium with trace right pleural effusion. Consider mild/early fluid overload. Signed by Dr Benjamin Rushing:    Principal Problem:    CHF (congestive heart failure), NYHA class I, acute on chronic, combined ([facility] [address]  Active Problems:    Left bundle branch block    Benign hypertension    Hyperlipidemia    Palliative care patient    Parkinsonism [facility])    Generalized weakness    Essential hypertension    Chest pain in adult    Hypothyroidism  Resolved Problems:    * No resolved hospital problems.  *      Continue with current medications  Continue with the gentle IV diuresis in the form of Lasix 20 mg twice daily  Strict I's and O's  Daily weights  Cardiac enzymes x3 were done which have been normal  Continue with cardiopulmonary monitoring  Full 2-D echo ordered in the morning with color-flow and doppler to evaluate cardiac structure and function  Cardiology consult given for evaluation and further treatment recommendations  Patient is a TSH level was significantly elevated at 53.95 and free TSH level was borderline at 1  Patient given 50 mcg IV levothyroxine today  Increased patient says Synthroid from 100 to 125 mcg p.o. daily  Strictly advised patient to take thyroid medication first thing in the morning and wait for at least 1 hour before taking any other medications or food  Encourage ambulation in hallways    Chronic medical issues . .. Continue with home meds. Monitor patient closely while admitted. Advised very close f/u with patient's PCP as an outpatient to address chronic medical issues. Repeat labs in a.m. Electrolyte replacement as per protocol. Patient will be monitored very closely on the floor. Further recommendations as per the hospital course. Discharge Plan: DC planning the next couple of days once she is clinically improved and cleared by cardiology      Patient  is on DVT prophylaxis  Current medications reviewed  Lab work reviewed  Radiology/Chest x-ray films reviewed  Treatment recommendations from suspecialities reviewed, appreciated and agreed with  Discussed with the nurse and addressed all questions/concerns  Discussed with Patient and/or Family at the bedside in detail . .. they understand and agree with the management plan. Maranda Patricio MD  1/1/2022 3:14 PM      DISCLAIMER: This note was created with electronic voice recognition which does have occasional errors. If you have any questions regarding the content within the note please do not hesitate to contact me. .. Thanks.

## 2022-01-02 ENCOUNTER — APPOINTMENT (OUTPATIENT)
Dept: NUCLEAR MEDICINE | Age: 82
DRG: 291 | End: 2022-01-02
Payer: MEDICARE

## 2022-01-02 PROBLEM — K21.9 GERD (GASTROESOPHAGEAL REFLUX DISEASE): Status: ACTIVE | Noted: 2022-01-02

## 2022-01-02 PROBLEM — K82.9 GALLBLADDER DISEASE: Status: ACTIVE | Noted: 2022-01-02

## 2022-01-02 PROBLEM — R07.9 CHEST PAIN IN ADULT: Status: RESOLVED | Noted: 2021-12-31 | Resolved: 2022-01-02

## 2022-01-02 PROBLEM — M10.9 GOUT: Status: ACTIVE | Noted: 2022-01-02

## 2022-01-02 PROBLEM — R32 INCONTINENCE: Status: ACTIVE | Noted: 2022-01-02

## 2022-01-02 PROBLEM — K63.5 COLON POLYPS: Status: ACTIVE | Noted: 2022-01-02

## 2022-01-02 PROBLEM — N30.90 BLADDER INFECTION: Status: ACTIVE | Noted: 2022-01-02

## 2022-01-02 PROBLEM — I51.9 HEART DISEASE: Status: ACTIVE | Noted: 2022-01-02

## 2022-01-02 PROBLEM — M19.90 ARTHRITIS: Status: ACTIVE | Noted: 2022-01-02

## 2022-01-02 PROBLEM — K59.00 CONSTIPATION: Status: ACTIVE | Noted: 2022-01-02

## 2022-01-02 PROBLEM — N18.9 CHRONIC KIDNEY DISEASE: Status: ACTIVE | Noted: 2022-01-02

## 2022-01-02 PROBLEM — I21.9 HEART ATTACK (HCC): Status: ACTIVE | Noted: 2022-01-02

## 2022-01-02 LAB
ANION GAP SERPL CALCULATED.3IONS-SCNC: 13 MMOL/L (ref 7–19)
BUN BLDV-MCNC: 21 MG/DL (ref 8–23)
CALCIUM SERPL-MCNC: 9.9 MG/DL (ref 8.8–10.2)
CHLORIDE BLD-SCNC: 104 MMOL/L (ref 98–111)
CO2: 23 MMOL/L (ref 22–29)
CREAT SERPL-MCNC: 1.2 MG/DL (ref 0.5–0.9)
GFR AFRICAN AMERICAN: 52
GFR NON-AFRICAN AMERICAN: 43
GLUCOSE BLD-MCNC: 86 MG/DL (ref 74–109)
HCT VFR BLD CALC: 38.5 % (ref 37–47)
HEMOGLOBIN: 11.6 G/DL (ref 12–16)
LV EF: 15 %
LVEF MODALITY: NORMAL
MCH RBC QN AUTO: 26.3 PG (ref 27–31)
MCHC RBC AUTO-ENTMCNC: 30.1 G/DL (ref 33–37)
MCV RBC AUTO: 87.3 FL (ref 81–99)
PDW BLD-RTO: 16.7 % (ref 11.5–14.5)
PHOSPHORUS: 3.3 MG/DL (ref 2.5–4.5)
PLATELET # BLD: 301 K/UL (ref 130–400)
PMV BLD AUTO: 10.2 FL (ref 9.4–12.3)
POTASSIUM SERPL-SCNC: 4.3 MMOL/L (ref 3.5–5)
RBC # BLD: 4.41 M/UL (ref 4.2–5.4)
SODIUM BLD-SCNC: 140 MMOL/L (ref 136–145)
WBC # BLD: 7.3 K/UL (ref 4.8–10.8)

## 2022-01-02 PROCEDURE — 6360000002 HC RX W HCPCS: Performed by: INTERNAL MEDICINE

## 2022-01-02 PROCEDURE — 85027 COMPLETE CBC AUTOMATED: CPT

## 2022-01-02 PROCEDURE — 6370000000 HC RX 637 (ALT 250 FOR IP): Performed by: NURSE PRACTITIONER

## 2022-01-02 PROCEDURE — 2140000000 HC CCU INTERMEDIATE R&B

## 2022-01-02 PROCEDURE — 2580000003 HC RX 258: Performed by: NURSE PRACTITIONER

## 2022-01-02 PROCEDURE — 78452 HT MUSCLE IMAGE SPECT MULT: CPT

## 2022-01-02 PROCEDURE — 6370000000 HC RX 637 (ALT 250 FOR IP): Performed by: INTERNAL MEDICINE

## 2022-01-02 PROCEDURE — A9500 TC99M SESTAMIBI: HCPCS | Performed by: INTERNAL MEDICINE

## 2022-01-02 PROCEDURE — 99232 SBSQ HOSP IP/OBS MODERATE 35: CPT | Performed by: INTERNAL MEDICINE

## 2022-01-02 PROCEDURE — 36415 COLL VENOUS BLD VENIPUNCTURE: CPT

## 2022-01-02 PROCEDURE — 6370000000 HC RX 637 (ALT 250 FOR IP): Performed by: HOSPITALIST

## 2022-01-02 PROCEDURE — 84100 ASSAY OF PHOSPHORUS: CPT

## 2022-01-02 PROCEDURE — 3430000000 HC RX DIAGNOSTIC RADIOPHARMACEUTICAL: Performed by: INTERNAL MEDICINE

## 2022-01-02 PROCEDURE — 80048 BASIC METABOLIC PNL TOTAL CA: CPT

## 2022-01-02 RX ORDER — FUROSEMIDE 20 MG/1
20 TABLET ORAL 2 TIMES DAILY
Qty: 60 TABLET | Refills: 0 | Status: SHIPPED | OUTPATIENT
Start: 2022-01-02 | End: 2022-01-03 | Stop reason: HOSPADM

## 2022-01-02 RX ORDER — SPIRONOLACTONE 25 MG/1
25 TABLET ORAL DAILY
Qty: 30 TABLET | Refills: 0 | Status: SHIPPED | OUTPATIENT
Start: 2022-01-03 | End: 2022-02-02

## 2022-01-02 RX ORDER — ISOSORBIDE MONONITRATE 30 MG/1
30 TABLET, EXTENDED RELEASE ORAL DAILY
Qty: 30 TABLET | Refills: 0 | Status: SHIPPED | OUTPATIENT
Start: 2022-01-03 | End: 2022-03-13

## 2022-01-02 RX ORDER — ISOSORBIDE MONONITRATE 30 MG/1
30 TABLET, EXTENDED RELEASE ORAL DAILY
Status: DISCONTINUED | OUTPATIENT
Start: 2022-01-02 | End: 2022-01-03 | Stop reason: HOSPADM

## 2022-01-02 RX ORDER — CARVEDILOL 12.5 MG/1
12.5 TABLET ORAL 2 TIMES DAILY WITH MEALS
Qty: 60 TABLET | Refills: 0 | Status: SHIPPED | OUTPATIENT
Start: 2022-01-02 | End: 2022-02-01

## 2022-01-02 RX ORDER — CARVEDILOL 12.5 MG/1
12.5 TABLET ORAL 2 TIMES DAILY WITH MEALS
Status: DISCONTINUED | OUTPATIENT
Start: 2022-01-02 | End: 2022-01-03 | Stop reason: HOSPADM

## 2022-01-02 RX ADMIN — SPIRONOLACTONE 25 MG: 25 TABLET ORAL at 09:56

## 2022-01-02 RX ADMIN — FUROSEMIDE 20 MG: 40 TABLET ORAL at 18:13

## 2022-01-02 RX ADMIN — Medication 400 MG: at 23:09

## 2022-01-02 RX ADMIN — ALLOPURINOL 100 MG: 100 TABLET ORAL at 23:09

## 2022-01-02 RX ADMIN — SODIUM CHLORIDE, PRESERVATIVE FREE 10 ML: 5 INJECTION INTRAVENOUS at 23:09

## 2022-01-02 RX ADMIN — ACETAMINOPHEN 650 MG: 325 TABLET ORAL at 09:59

## 2022-01-02 RX ADMIN — APIXABAN 5 MG: 5 TABLET, FILM COATED ORAL at 15:10

## 2022-01-02 RX ADMIN — LEVOTHYROXINE SODIUM 125 MCG: 125 TABLET ORAL at 05:57

## 2022-01-02 RX ADMIN — TETRAKIS(2-METHOXYISOBUTYLISOCYANIDE)COPPER(I) TETRAFLUOROBORATE 10 MILLICURIE: 1 INJECTION, POWDER, LYOPHILIZED, FOR SOLUTION INTRAVENOUS at 09:35

## 2022-01-02 RX ADMIN — APIXABAN 5 MG: 5 TABLET, FILM COATED ORAL at 23:09

## 2022-01-02 RX ADMIN — ATORVASTATIN CALCIUM 20 MG: 20 TABLET, FILM COATED ORAL at 09:58

## 2022-01-02 RX ADMIN — Medication 400 MG: at 09:58

## 2022-01-02 RX ADMIN — TROSPIUM CHLORIDE 20 MG: 20 TABLET, FILM COATED ORAL at 23:08

## 2022-01-02 RX ADMIN — ALLOPURINOL 100 MG: 100 TABLET ORAL at 09:58

## 2022-01-02 RX ADMIN — CYANOCOBALAMIN TAB 500 MCG 1000 MCG: 500 TAB at 09:59

## 2022-01-02 RX ADMIN — FUROSEMIDE 20 MG: 40 TABLET ORAL at 10:00

## 2022-01-02 RX ADMIN — SACUBITRIL AND VALSARTAN 1 TABLET: 24; 26 TABLET, FILM COATED ORAL at 09:59

## 2022-01-02 RX ADMIN — CARVEDILOL 6.25 MG: 6.25 TABLET, FILM COATED ORAL at 09:58

## 2022-01-02 RX ADMIN — ACETAMINOPHEN 650 MG: 325 TABLET ORAL at 18:13

## 2022-01-02 RX ADMIN — ENOXAPARIN SODIUM 60 MG: 60 INJECTION SUBCUTANEOUS at 09:59

## 2022-01-02 RX ADMIN — CARVEDILOL 12.5 MG: 12.5 TABLET, FILM COATED ORAL at 18:13

## 2022-01-02 RX ADMIN — SODIUM CHLORIDE, PRESERVATIVE FREE 10 ML: 5 INJECTION INTRAVENOUS at 09:58

## 2022-01-02 RX ADMIN — REGADENOSON 0.4 MG: 0.08 INJECTION, SOLUTION INTRAVENOUS at 08:35

## 2022-01-02 RX ADMIN — Medication 1000 UNITS: at 09:59

## 2022-01-02 RX ADMIN — SACUBITRIL AND VALSARTAN 1 TABLET: 24; 26 TABLET, FILM COATED ORAL at 23:09

## 2022-01-02 RX ADMIN — ASPIRIN 81 MG: 81 TABLET, COATED ORAL at 09:58

## 2022-01-02 RX ADMIN — ISOSORBIDE MONONITRATE 30 MG: 30 TABLET, EXTENDED RELEASE ORAL at 15:10

## 2022-01-02 RX ADMIN — TETRAKIS(2-METHOXYISOBUTYLISOCYANIDE)COPPER(I) TETRAFLUOROBORATE 30 MILLICURIE: 1 INJECTION, POWDER, LYOPHILIZED, FOR SOLUTION INTRAVENOUS at 09:36

## 2022-01-02 ASSESSMENT — PAIN SCALES - GENERAL
PAINLEVEL_OUTOF10: 2
PAINLEVEL_OUTOF10: 6
PAINLEVEL_OUTOF10: 6
PAINLEVEL_OUTOF10: 0

## 2022-01-02 ASSESSMENT — PAIN DESCRIPTION - LOCATION
LOCATION: NECK

## 2022-01-02 ASSESSMENT — PAIN SCALES - WONG BAKER
WONGBAKER_NUMERICALRESPONSE: 6
WONGBAKER_NUMERICALRESPONSE: 2
WONGBAKER_NUMERICALRESPONSE: 6

## 2022-01-02 ASSESSMENT — PAIN DESCRIPTION - PAIN TYPE
TYPE: CHRONIC PAIN

## 2022-01-02 NOTE — PLAN OF CARE
Problem: Skin Integrity:  Goal: Will show no infection signs and symptoms  Description: Will show no infection signs and symptoms  Outcome: Met This Shift  Goal: Absence of new skin breakdown  Description: Absence of new skin breakdown  Outcome: Met This Shift     Problem: Falls - Risk of:  Goal: Will remain free from falls  Description: Will remain free from falls  1/2/2022 0019 by Adrianna Thomas RN  Outcome: Met This Shift  1/1/2022 1114 by Alison Rose RN  Outcome: Ongoing  Goal: Absence of physical injury  Description: Absence of physical injury  1/2/2022 0019 by Adrianna Thomas RN  Outcome: Met This Shift  1/1/2022 1114 by Alison Rose RN  Outcome: Ongoing

## 2022-01-02 NOTE — DISCHARGE SUMMARY
[facility] [place], [address] [place] [address] MEDICINE      DISCHARGE SUMMARY:      PATIENT NAME:  Yesenia Deluna  :  1940  MRN:  [id number]    Admission Date:   2021  2:07 PM Attending: Ayah Brown MD   Discharge Date:   2022              PCP: Tommy Navarro  Length of Stay: 2 days     Chief Complaint on Admission:   Chief Complaint   Patient presents with    Chest Pain    Atrial Fibrillation       Consultants:     IP CONSULT TO HEART FAILURE NURSE/COORDINATOR  IP CONSULT TO DIETITIAN  IP CONSULT TO CARDIOLOGY  IP CONSULT TO CASE MANAGEMENT  IP CONSULT TO HOME CARE NEEDS       Discharge Problem List:   Principal Problem:    CHF (congestive heart failure), NYHA class I, acute on chronic, combined ([address]  Active Problems:    Coronary artery disease involving native coronary artery of native heart without angina pectoris    Left bundle branch block    Benign hypertension    Hyperlipidemia    Palliative care patient    Parkinsonism ([facility] [address]    Generalized weakness    Essential hypertension    Hypothyroidism    Arthritis    Chronic kidney disease    Constipation    Gallbladder disease    GERD (gastroesophageal reflux disease)    Gout    Heart disease    Incontinence  Resolved Problems:    Chest pain in adult    [address]  TREATMENT:    2022  Patient is feeling better today. Shortness of breath has improved. She is not requiring any oxygen. Patient has large inferior/inferolateral wall motion defect consistent with prior infarction with EF around 15% on Lexiscan nuclear stress test that was done today. Patient has been cleared from cardiology standpoint to be discharged. Multiple med changes done by cardiology as reflected in the med rec sheet below. Patient needs very close follow-up with cardiology as an outpatient.   She may need biventricular ICD placement as per cardiology recommendations from today as below:         2022  Patient is diuresing well on gentle IV diuretics. 2D echo is being done today which will be followed. Patient's TSH level was significantly high at 53.95 hence a free T4 level was done which was borderline at 1. I ordered 50 mcg of levothyroxine IV x1 dose and increase her Synthroid dosing to 125 mg orally daily. She is advised to take her thyroid medication first thing in the morning and wait for least 1 hour before taking any other medications or food, for maximum benefits. Awaiting cardiology evaluation.     12/31/2021  History Of Present Illness: The patient is a 79 y. o. female who presented to [facility] ED complaining of chest pain and shortness of breath. Pt was discharged from [facility] 2 days ago currently living with her daughter. She has history of CHF, CKD, HTN, parkinsonism and CAD s/p CABG. She is a poor historian and history of present illness is obtained primarily from her daughter. Pt's daughter tells me that mom has had increasing shortness of breath over past couple of days. Pt is said to mostly be in bed or wheelchair able to stand to bedside commode however over past couple of days she has been short of breath getting oob to bedside commode. She has had report of vague chest pain. She has history of CHF per daughter who relates that patient was not continued on diuretic medication at recent discharge from hospital. Pt was discharged 11/17 to nursing home after evaluation of altered mental status, hypertension, visual hallucinations with acute worsening of renal function per daughter. Troponin and EKG were negative for ACS.  She was noted to be volume overloaded in the ED and given lasix 40mg IV admitted for further evaluation and treatment.    OBJECTIVE:  /82   Pulse 86   Temp 97.7 °F (36.5 °C) (Oral)   Resp 16   Wt 156 lb 5 oz (70.9 kg)   SpO2 94%   BMI 28.59 kg/m²       Heart: RRR   Lungs: Bilateral decreased air entry, scattered bilateral rales    Abdomen: Soft, non-tender Extremities: + Trace bilateral pitting edema   Neurologic: Alert and oriented   Skin: Warm and dry          Laboratory Data:  Recent Labs     12/31/21  1435 01/01/22  0256 01/02/22  0128   WBC 7.9 6.8 7.3   HGB 12.2 12.2 11.6*    327 301     Recent Labs     12/31/21  1435 01/01/22  0256 01/02/22  0128    140 140   K 4.4 4.2 4.3    106 104   CO2 24 22 23   BUN 17 18 21   CREATININE 1.0* 1.1* 1.2*   GLUCOSE 92 91 86     Recent Labs     12/31/21  1435 01/01/22  0256   AST 23 19   ALT 11 10   BILITOT 0.6 0.5   ALKPHOS 94 83     Troponin T:   Recent Labs     12/31/21  1435 12/31/21  2048 01/01/22  0942   TROPONINI 0.01 <0.01 0.01     Pro-BNP: No results for input(s): BNP in the last 72 hours. INR:   Recent Labs     12/31/21 1435   INR 1.05     UA:No results for input(s): NITRITE, COLORU, PHUR, LABCAST, WBCUA, RBCUA, MUCUS, TRICHOMONAS, YEAST, BACTERIA, CLARITYU, SPECGRAV, LEUKOCYTESUR, UROBILINOGEN, BILIRUBINUR, BLOODU, GLUCOSEU, AMORPHOUS in the last 72 hours. Invalid input(s): Ros Pinon  A1C: No results for input(s): LABA1C in the last 72 hours. ABG:No results for input(s): PHART, RAO8LNN, PO2ART, VAM5CLW, BEART, HGBAE, A8PLQYXB, CARBOXHGBART in the last 72 hours. Impressions of imaging performed in 48 hours before discharge:    NM MYOCARDIAL SPECT REST EXERCISE OR RX    Result Date: 1/2/2022  Impression: There is large inferior to inferolateral and apical infarct with minimal ana-infarct ischemia, with a calculated ejection fraction of 15 %. Suggest: Clinical correlation is advised. EKG with left bundle branch block.  Signed by Dr Angelita Mcmullen          Medication List      START taking these medications    apixaban 5 MG Tabs tablet  Commonly known as: ELIQUIS  Take 1 tablet by mouth 2 times daily     carvedilol 12.5 MG tablet  Commonly known as: COREG  Take 1 tablet by mouth 2 times daily (with meals)     furosemide 20 MG tablet  Commonly known as: LASIX  Take 1 tablet by mouth 2 times daily     isosorbide mononitrate 30 MG extended release tablet  Commonly known as: IMDUR  Take 1 tablet by mouth daily  Start taking on: January 3, 2022     sacubitril-valsartan 24-26 MG per tablet  Commonly known as: ENTRESTO  Take 1 tablet by mouth 2 times daily     spironolactone 25 MG tablet  Commonly known as: ALDACTONE  Take 1 tablet by mouth daily  Start taking on: January 3, 2022        CONTINUE taking these medications    allopurinol 100 MG tablet  Commonly known as: ZYLOPRIM     amLODIPine 5 MG tablet  Commonly known as: NORVASC     ammonium lactate 12 % lotion  Commonly known as: LAC-HYDRIN     aspirin 81 MG EC tablet     atorvastatin 20 MG tablet  Commonly known as: LIPITOR     Cyanocobalamin 1000 MCG Lozg     levothyroxine 100 MCG tablet  Commonly known as: SYNTHROID     magnesium oxide 400 MG tablet  Commonly known as: MAG-OX     nitro-bid 2 % ointment  Generic drug: nitroglycerin     tolterodine 2 MG tablet  Commonly known as: DETROL     vitamin D 25 MCG (1000 UT) Caps        STOP taking these medications    DULoxetine 30 MG extended release capsule  Commonly known as: CYMBALTA     losartan 25 MG tablet  Commonly known as: COZAAR           Where to Get Your Medications      [facility]  [address]    Phone: [phone]   · apixaban 5 MG Tabs tablet  · carvedilol 12.5 MG tablet  · furosemide 20 MG tablet  · isosorbide mononitrate 30 MG extended release tablet  · sacubitril-valsartan 24-26 MG per tablet  · spironolactone 25 MG tablet           ISSUES TO BE ADDRESSED AT HOSPITAL FOLLOW-UP VISIT:    Advised patient to follow-up closely with PCP upon discharge for management of chronic medical issues  Please see the detailed discharge directions delivered from earlier today!     Condition on Discharge: gradually improving  Discharge Disposition: Home with Home

## 2022-01-02 NOTE — CARE COORDINATION
CHERI spoke with Pt daughter Nick Cheema ([phone]) over the phone regarding concerns with Pt being home alone since she works. Kenia James stated Pt had been at [facility] for about a month and a half then came home and two days later she ended up back at the hospital. Kenia James stated she would like to see if Pt could get into [facility] this admission. Kenia James stated Pt is fully vaccinated and has had her booster.  CHERI will notify the facility of the referral. Awaiting approval/denial.  [facility]  [phone]  Electronically signed by Cliff Fitzpatrick on 1/2/2022 at 4:27 PM

## 2022-01-02 NOTE — PROGRESS NOTES
Called patient's daughter Kim Zavaleta to inform of discharge plan and discuss home health care options. Kim Zavaleta expressed concern that patient would not be safe to be left unattended at home as she (daughter) works a full time job. Stated that she had been considering sending patient to short-term rehab following this hospital stay. Expressed to nurse that she would like to discuss discharge planning and options with a . Nurse has reached out to weekend ; left message; waiting on call back at this time. Have informed Dr Caprice Newberry. Electronically signed by Valdemar Mcneal RN on 1/2/2022 at 3:59 PM     Tony Mak  is aware of consult and is contacting patient's daughter.   Electronically signed by Valdemar Mcneal RN on 1/2/2022 at 4:23 PM

## 2022-01-02 NOTE — CARE COORDINATION
CHERI spoke with Pt nurse regarding concerns with Pt going home from her family. Pt nurse also has concerns with Pt discharging home alone. CHERI will reach out to Pt daughter Alessandra Worley regarding DC planning options. May we please have PT OT evaluation orders added to the system? Thank you.   Electronically signed by Malcom Nelson on 1/2/2022 at 4:17 PM

## 2022-01-02 NOTE — PROGRESS NOTES
1.  Coronary artery disease, prior CABG with four-vessel grafting 8/2017 [facility], [address]) with LIMA sequential graft to diagonal and LAD, SVG sequential graft to OM1 and RCA, prior ejection fraction 45 to 50%, ejection fraction 25 to 30%, left bundle branch block. 2.  Paroxysmal atrial fibrillation not on anticoagulation. 3.  Probable early dementia.     Subjective:  Ms. Saad Agee reports no chest pain or shortness of breath. Appears comfortable in bed. Remains in atrial fibrillation with good rate control. Objective:   /82   Pulse 86   Temp 97.7 °F (36.5 °C) (Oral)   Resp 16   Wt 156 lb 5 oz (70.9 kg)   SpO2 94%   BMI 28.59 kg/m²       Intake/Output Summary (Last 24 hours) at 1/2/2022 1353  Last data filed at 1/1/2022 1821  Gross per 24 hour   Intake 0 ml   Output 475 ml   Net -475 ml       Prior to Admission medications    Medication Sig Start Date End Date Taking?  Authorizing Provider   tolterodine (DETROL) 2 MG tablet Take 2 mg by mouth nightly   Yes Historical Provider, MD   allopurinol (ZYLOPRIM) 100 MG tablet Take 100 mg by mouth 2 times daily   Yes Historical Provider, MD   amLODIPine (NORVASC) 5 MG tablet Take 5 mg by mouth daily    Yes Historical Provider, MD   aspirin 81 MG EC tablet Take 81 mg by mouth daily   Yes Historical Provider, MD   atorvastatin (LIPITOR) 20 MG tablet Take 20 mg by mouth daily   Yes Historical Provider, MD   Cyanocobalamin 1000 MCG LOZG Take 1,000 mcg by mouth daily   Yes Historical Provider, MD   levothyroxine (SYNTHROID) 100 MCG tablet Take 100 mcg by mouth Daily   Yes Historical Provider, MD   losartan (COZAAR) 25 MG tablet Take 25 mg by mouth daily   Yes Historical Provider, MD   magnesium oxide (MAG-OX) 400 MG tablet Take 400 mg by mouth 2 times daily    Yes Historical Provider, MD   vitamin D 25 MCG (1000 UT) CAPS Take 1,000 Units by mouth daily    Yes Historical Provider, MD   ammonium lactate (LAC-HYDRIN) 12 % lotion Apply topically as needed for Dry Skin Apply topically as needed. Historical Provider, MD   nitroglycerin (NITRO-BID) 2 % ointment Place 0.5 inches onto the skin as needed     Historical Provider, MD        apixaban  5 mg Oral BID    carvedilol  12.5 mg Oral BID WC    isosorbide mononitrate  30 mg Oral Daily    levothyroxine  125 mcg Oral Daily    sacubitril-valsartan  1 tablet Oral BID    spironolactone  25 mg Oral Daily    sodium chloride flush  5-40 mL IntraVENous 2 times per day    allopurinol  100 mg Oral BID    aspirin  81 mg Oral Daily    atorvastatin  20 mg Oral Daily    vitamin B-12  1,000 mcg Oral Daily    magnesium oxide  400 mg Oral BID    trospium  20 mg Oral Nightly    Vitamin D  1,000 Units Oral Daily    furosemide  20 mg Oral BID       TELEMETRY: Atrial fibrillation     Physical Exam:      Physical Exam  Constitutional:       General: She is not in acute distress. Appearance: She is obese. She is not diaphoretic. HENT:      Mouth/Throat:      Pharynx: No oropharyngeal exudate. Eyes:      General: No scleral icterus. Right eye: No discharge. Left eye: No discharge. Neck:      Thyroid: No thyromegaly. Vascular: No JVD. Cardiovascular:      Rate and Rhythm: Normal rate and regular rhythm. No extrasystoles are present. Heart sounds: Normal heart sounds, S1 normal and S2 normal. No murmur heard. No systolic murmur is present. No diastolic murmur is present. No friction rub. No gallop. No S3 or S4 sounds. Comments: Mild jugular venous distention  No edema  No systolic or diastolic murmurs  Pulmonary:      Effort: Pulmonary effort is normal. No respiratory distress. Breath sounds: Normal breath sounds. No wheezing or rales. Chest:      Chest wall: No tenderness. Abdominal:      General: Bowel sounds are normal. There is no distension. Palpations: Abdomen is soft. There is no mass. Tenderness: There is no abdominal tenderness.  There is no guarding or rebound. Hernia: No hernia is present. Comments: No palpable organomegaly   Musculoskeletal:         General: Normal range of motion. Skin:     General: Skin is warm. Coloration: Skin is not pale. Findings: No rash. Neurological:      Mental Status: She is alert and oriented to person, place, and time. Cranial Nerves: No cranial nerve deficit. Deep Tendon Reflexes: Reflexes normal.                 Lab Data:  CBC:   Recent Labs     12/31/21  1435 01/01/22  0256 01/02/22  0128   WBC 7.9 6.8 7.3   HGB 12.2 12.2 11.6*   HCT 40.8 39.3 38.5   MCV 87.2 86.9 87.3    327 301     BMP:   Recent Labs     12/31/21  1435 01/01/22 0256 01/02/22  0128    140 140   K 4.4 4.2 4.3    106 104   CO2 24 22 23   PHOS  --   --  3.3   BUN 17 18 21   CREATININE 1.0* 1.1* 1.2*     LIVER PROFILE:   Recent Labs     12/31/21 1435 01/01/22  0256   AST 23 19   ALT 11 10   BILITOT 0.6 0.5   ALKPHOS 94 83     PT/INR:   Recent Labs     12/31/21  1435   PROTIME 13.9   INR 1.05     APTT:   Recent Labs     12/31/21  1435   APTT 33.4     BNP:  No results for input(s): BNP in the last 72 hours. CK, CKMB, Troponin: @LABRCNT (CKTOTAL:3, CKMB:3, TROPONINI:3)@    IMAGING:  ECHO Complete 2D W Doppler W Color    Result Date: 1/1/2022  Transthoracic Echocardiography Report (TTE)  Demographics   Patient Name   Nael Wheeler  Date of Study            01/01/2022   MRN            [id number]          Gender                   Female   Date of Birth  1940      Room Number              VEE-6820   Age            79 year(s)   Height:        62 inches       Referring Physician      Demarcus Archuleta   Weight:        156 pounds      Sonographer              Hernandez Hardy   BSA:           1.72 m^2        Interpreting Physician   Monse Woods MD   BMI:           28.53 kg/m^2  Procedure Type of Study   TTE procedure:ECHO NO CONTRAST WITH DOP/COLR.   Study Location: Echo Lab Technical Quality: Good visualization Patient Status: Inpatient BP: 129/94 mmHg Indications:Congestive heart failure and Chest pain. Conclusions   Summary  LV appears borderline dilated with severely reduced LV systolic function. LV ejection fraction estimated at 25 to 30%. Probable diastolic dysfunction but difficult to assess accurately due to  rhythm. Mild concentric LVH. RV appears normal in size with preserved systolic function. Mild left atrial enlargement. Normal right atrial size. Aortic valve appears trileaflet with moderate leaflet thickening but  preserved mobility. Mild aortic regurgitation. No stenosis. Mitral valve leaflets appear mildly thickened with mildly reduced leaflet  mobility. No stenosis. Mild mitral regurgitation. Mild tricuspid regurgitation. Mild pulmonic regurgitation. No significant pericardial effusion. Signature   ----------------------------------------------------------------  Electronically signed by Silvana Woods MD(Interpreting physician)  on 01/01/2022 04:40 PM  ----------------------------------------------------------------  M-Mode Measurements (cm)   LVIDd: 4.52 cm                         LVIDs: 3.74 cm  IVSd: 0.94 cm  LVPWd: 0.96 cm                         AO Root Dimension: 2 cm  % Ejection Fraction: 27 %              LA: 4.6 cm                                         LVOT: 2.4 cm  Doppler Measurements:   AV Peak Velocity:169 cm/s            MV Peak E-Wave: 94.7 cm/s  AV Peak Gradient: 11.42 mmHg         MV Peak A-Wave: 96 cm/s  AV Mean Gradient: 5 mmHg             MV E/A Ratio: 0.99 %  AV Area (Continuity):2.06 cm^2       MV Peak Gradient: 3.59 mmHg  TR Velocity:219 cm/s                 MV P1/2t: 153 msec  TR Gradient:19.18 mmHg               MVA by PHT1.44 cm^2  Estimated RAP:3 mmHg  RVSP:28 mmHg      XR CHEST PORTABLE    Result Date: 12/31/2021  Exam:   XR CHEST PORTABLE  Date:  12/31/2021 History:  Female, age  79 years; chest pain COMPARISON:  Chest x-ray dated November 17, 2021. Findings :  Moderate cardiomegaly. Mediastinal wires appear intact. Right perihilar opacity, unchanged, may reflect prominent pulmonary vasculature. Coarsening of interstitium. Suspected trace right pleural effusion. The bones show no acute pathology. Impression: Cardiomegaly, coarsening of interstitium with trace right pleural effusion. Consider mild/early fluid overload. Signed by Dr Kristy Diaz    Result Date: 1/2/2022  [place] Nuclear Stress Test Report Procedure date: 1/2/2022 Indications: chest pain Procedure: Stress was performed with injection of 0.4 mg Lexiscan. Vital signs and EKG were monitored. Technetium-99 sestamibi was injected in divided doses, approximately 10.3 mCi and 31.8 mCi respectively for rest and stress imaging. The patient was discharged in stable condition. Results: Patient had symptoms of dyspnea during infusion that resolved in recovery. Baseline EKG showed atrial fibrillation, left bundle branch block. During stress there were no significant EKG changes or rhythm changes. Baseline and peak blood pressures were 134/89, and 134/89 respectively. Baseline and peak heart rates were 87 and  107 respectively. EF=15% Lexiscan/Cardiolyte Nuclear Medicine Report Date of Procedure: 1/2/2022 The patient was injected with 07.1 millicuries (mCi) of Technetium (Tc99m). After an appropriate level of stress the patient was re-injected with 95.6 millicuries (mCi) of Technetium (Tc99m). Repeat gated images were then performed per standard protocol. Findings: 1. Analysis of the the stress and rest images reveals large inferior to inferolateral and apical predominantly fixed defect. 2.  Analysis of the gated images reveals severely reduced left ventricular function with a calculated ejection fraction of 15 %. Impression: There is large inferior to inferolateral and apical infarct with minimal ana-infarct ischemia, with a calculated ejection fraction of 15 %. Suggest: Clinical correlation is advised. EKG with left bundle branch block. Signed by Dr Patel Rene and Plan: This is a 79y.o. year old female with past medical history of coronary artery disease, prior four-vessel CABG 8/2017, prior ejection fraction 45 to 50%, possible early dementia/anxiety disorder, admitted with chest pain, negative troponins, echo with LV systolic function significantly depressed at 25 to 30% with paroxysmal atrial fibrillation, rate controlled. 1.  Lexiscan study reviewed. Large inferior to inferolateral predominantly fixed defect consistent with prior infarction. Will try and manage medically at this time. Add Imdur 30 mg once daily. If significant recurrent chest pain, will need consideration for cardiac catheterization. 2.  Tolerating Entresto and Coreg. Can increase Coreg to 12.5 mg twice daily. 3.  Remains in atrial fibrillation. Switch to Eliquis 5 mg twice daily. Continue rate control strategy at this time. Reevaluate LV systolic function in 3 months. If EF remains depressed less than 35%, consideration for biventricular ICD. 4.  Follow-up with cardiology within 2 weeks. 5.  Daughter at bedside and have discussed findings and treatment plan in detail with them.     Carolyn Senior MD, MD 1/2/2022 1:53 PM

## 2022-01-03 VITALS
WEIGHT: 155.7 LBS | HEART RATE: 82 BPM | OXYGEN SATURATION: 94 % | RESPIRATION RATE: 16 BRPM | BODY MASS INDEX: 28.48 KG/M2 | SYSTOLIC BLOOD PRESSURE: 100 MMHG | TEMPERATURE: 97.5 F | DIASTOLIC BLOOD PRESSURE: 61 MMHG

## 2022-01-03 LAB
ANION GAP SERPL CALCULATED.3IONS-SCNC: 14 MMOL/L (ref 7–19)
BUN BLDV-MCNC: 30 MG/DL (ref 8–23)
CALCIUM SERPL-MCNC: 9.7 MG/DL (ref 8.8–10.2)
CHLORIDE BLD-SCNC: 99 MMOL/L (ref 98–111)
CO2: 23 MMOL/L (ref 22–29)
CREAT SERPL-MCNC: 1.5 MG/DL (ref 0.5–0.9)
CREAT SERPL-MCNC: 1.5 MG/DL (ref 0.5–0.9)
EKG P AXIS: NORMAL DEGREES
EKG P-R INTERVAL: NORMAL MS
EKG Q-T INTERVAL: 378 MS
EKG QRS DURATION: 156 MS
EKG QTC CALCULATION (BAZETT): 460 MS
EKG T AXIS: 27 DEGREES
GFR AFRICAN AMERICAN: 40
GFR AFRICAN AMERICAN: 40
GFR NON-AFRICAN AMERICAN: 33
GFR NON-AFRICAN AMERICAN: 33
GLUCOSE BLD-MCNC: 127 MG/DL (ref 74–109)
HCT VFR BLD CALC: 36.6 % (ref 37–47)
HEMOGLOBIN: 11.2 G/DL (ref 12–16)
MCH RBC QN AUTO: 26.6 PG (ref 27–31)
MCHC RBC AUTO-ENTMCNC: 30.6 G/DL (ref 33–37)
MCV RBC AUTO: 86.9 FL (ref 81–99)
PDW BLD-RTO: 17 % (ref 11.5–14.5)
PLATELET # BLD: 313 K/UL (ref 130–400)
PMV BLD AUTO: 10.5 FL (ref 9.4–12.3)
POTASSIUM SERPL-SCNC: 4.5 MMOL/L (ref 3.5–5)
PRO-BNP: 9710 PG/ML (ref 0–1800)
RBC # BLD: 4.21 M/UL (ref 4.2–5.4)
SARS-COV-2, NAAT: NOT DETECTED
SODIUM BLD-SCNC: 136 MMOL/L (ref 136–145)
WBC # BLD: 8.2 K/UL (ref 4.8–10.8)

## 2022-01-03 PROCEDURE — 6370000000 HC RX 637 (ALT 250 FOR IP): Performed by: INTERNAL MEDICINE

## 2022-01-03 PROCEDURE — 93010 ELECTROCARDIOGRAM REPORT: CPT | Performed by: INTERNAL MEDICINE

## 2022-01-03 PROCEDURE — 80048 BASIC METABOLIC PNL TOTAL CA: CPT

## 2022-01-03 PROCEDURE — 97162 PT EVAL MOD COMPLEX 30 MIN: CPT

## 2022-01-03 PROCEDURE — 87635 SARS-COV-2 COVID-19 AMP PRB: CPT

## 2022-01-03 PROCEDURE — 6370000000 HC RX 637 (ALT 250 FOR IP): Performed by: HOSPITALIST

## 2022-01-03 PROCEDURE — 36415 COLL VENOUS BLD VENIPUNCTURE: CPT

## 2022-01-03 PROCEDURE — 97530 THERAPEUTIC ACTIVITIES: CPT

## 2022-01-03 PROCEDURE — 6370000000 HC RX 637 (ALT 250 FOR IP): Performed by: NURSE PRACTITIONER

## 2022-01-03 PROCEDURE — 83880 ASSAY OF NATRIURETIC PEPTIDE: CPT

## 2022-01-03 PROCEDURE — 85027 COMPLETE CBC AUTOMATED: CPT

## 2022-01-03 PROCEDURE — 82565 ASSAY OF CREATININE: CPT

## 2022-01-03 PROCEDURE — 2580000003 HC RX 258: Performed by: NURSE PRACTITIONER

## 2022-01-03 PROCEDURE — 99232 SBSQ HOSP IP/OBS MODERATE 35: CPT | Performed by: INTERNAL MEDICINE

## 2022-01-03 RX ORDER — FUROSEMIDE 20 MG/1
20 TABLET ORAL DAILY
Status: DISCONTINUED | OUTPATIENT
Start: 2022-01-04 | End: 2022-01-03 | Stop reason: HOSPADM

## 2022-01-03 RX ORDER — SPIRONOLACTONE 25 MG/1
12.5 TABLET ORAL DAILY
Status: DISCONTINUED | OUTPATIENT
Start: 2022-01-04 | End: 2022-01-03 | Stop reason: HOSPADM

## 2022-01-03 RX ORDER — FUROSEMIDE 20 MG/1
20 TABLET ORAL DAILY
Qty: 60 TABLET | Refills: 3 | Status: ON HOLD | OUTPATIENT
Start: 2022-01-04 | End: 2022-03-16 | Stop reason: SDUPTHER

## 2022-01-03 RX ADMIN — ASPIRIN 81 MG: 81 TABLET, COATED ORAL at 10:11

## 2022-01-03 RX ADMIN — CARVEDILOL 12.5 MG: 12.5 TABLET, FILM COATED ORAL at 10:10

## 2022-01-03 RX ADMIN — FUROSEMIDE 20 MG: 40 TABLET ORAL at 10:10

## 2022-01-03 RX ADMIN — ALLOPURINOL 100 MG: 100 TABLET ORAL at 10:10

## 2022-01-03 RX ADMIN — SODIUM CHLORIDE, PRESERVATIVE FREE 10 ML: 5 INJECTION INTRAVENOUS at 10:23

## 2022-01-03 RX ADMIN — LEVOTHYROXINE SODIUM 125 MCG: 125 TABLET ORAL at 06:10

## 2022-01-03 RX ADMIN — ATORVASTATIN CALCIUM 20 MG: 20 TABLET, FILM COATED ORAL at 10:11

## 2022-01-03 RX ADMIN — Medication 1000 UNITS: at 10:11

## 2022-01-03 RX ADMIN — Medication 400 MG: at 10:11

## 2022-01-03 RX ADMIN — CYANOCOBALAMIN TAB 500 MCG 1000 MCG: 500 TAB at 10:10

## 2022-01-03 RX ADMIN — SPIRONOLACTONE 25 MG: 25 TABLET ORAL at 10:10

## 2022-01-03 RX ADMIN — ISOSORBIDE MONONITRATE 30 MG: 30 TABLET, EXTENDED RELEASE ORAL at 10:10

## 2022-01-03 RX ADMIN — ACETAMINOPHEN 650 MG: 325 TABLET ORAL at 15:54

## 2022-01-03 RX ADMIN — SACUBITRIL AND VALSARTAN 1 TABLET: 24; 26 TABLET, FILM COATED ORAL at 10:10

## 2022-01-03 RX ADMIN — APIXABAN 5 MG: 5 TABLET, FILM COATED ORAL at 10:10

## 2022-01-03 ASSESSMENT — PAIN SCALES - GENERAL
PAINLEVEL_OUTOF10: 0
PAINLEVEL_OUTOF10: 9

## 2022-01-03 NOTE — PROGRESS NOTES
Cardiology Progress Note Joseph Cain MD      Patient:  Elayne Rivera  [id number]    Patient Active Problem List    Diagnosis Date Noted    Arthritis 01/02/2022     Priority: Low    Bladder infection 01/02/2022     Priority: Low    Chronic kidney disease 01/02/2022     Priority: Low    Colon polyps 01/02/2022     Priority: Low    Constipation 01/02/2022     Priority: Low    Gallbladder disease 01/02/2022     Priority: Low    GERD (gastroesophageal reflux disease) 01/02/2022     Priority: Low    Gout 01/02/2022     Priority: Low    Heart attack ([address] 01/02/2022     Priority: Low    Heart disease 01/02/2022     Priority: Low    Incontinence 01/02/2022     Priority: Low    Hypothyroidism 01/01/2022     Priority: Low    CHF (congestive heart failure), NYHA class I, acute on chronic, combined ([address] 12/31/2021     Priority: Low    Acute encephalopathy      Priority: Low    Visual hallucinations      Priority: Low    Parkinsonism ([address]      Priority: Low    Generalized weakness      Priority: Low    Essential hypertension      Priority: Low    Palliative care patient 11/18/2021     Priority: Low    Altered mental status 11/17/2021     Priority: Low    Coronary artery disease involving native coronary artery of native heart without angina pectoris 03/08/2021     Priority: Low     Overview Note:     CABG x4, 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       Left bundle branch block 03/08/2021     Priority: Low     Overview Note:     Chronic      Benign hypertension 03/08/2021     Priority: Low    Hyperlipidemia 03/08/2021     Priority: Low    Secondary hypertension 02/08/2021     Priority: Low       Admit Date:  12/31/2021    Admission Problem List: Present on Admission:   CHF (congestive heart failure), NYHA class I, acute on chronic, combined ([facility]   Hyperlipidemia   Left bundle branch block   Essential hypertension   (Resolved) Chest pain in adult   Hypothyroidism   Palliative care patient   Parkinsonism ([facility]   Generalized weakness   Benign hypertension   Coronary artery disease involving native coronary artery of native heart without angina pectoris   Arthritis   Chronic kidney disease   Constipation   Gallbladder disease   GERD (gastroesophageal reflux disease)   Gout   Heart disease   Incontinence      Cardiac Specific Data:  Specialty Problems        Cardiology Problems    Secondary hypertension        Benign hypertension        Coronary artery disease involving native coronary artery of native heart without angina pectoris        Hyperlipidemia        Left bundle branch block        Essential hypertension        * (Principal) CHF (congestive heart failure), NYHA class I, acute on chronic, combined ([facility]        Heart attack ([facility]        Heart disease            1.  Coronary artery disease, prior CABG with four-vessel grafting 8/2017 [facility], [address]) with LIMA sequential graft to diagonal and LAD, SVG sequential graft to OM1 and RCA, prior ejection fraction 45 to 50%, ejection fraction 25 to 30%, left bundle branch block. 2.  Paroxysmal atrial fibrillation not on anticoagulation. 3.  Probable early dementia. Subjective:  Ms. Kristian Roldan reports doing well. No chest pain or shortness of breath. Rate controlled atrial fibrillation. Mild increase in creatinine noted at 1.5 with BUN at 30. Hemodynamically stable with normal blood pressures. Objective:   /61   Pulse 82   Temp 97.5 °F (36.4 °C) (Temporal)   Resp 16   Wt 155 lb 11.2 oz (70.6 kg)   SpO2 94%   BMI 28.48 kg/m²       Intake/Output Summary (Last 24 hours) at 1/3/2022 1501  Last data filed at 1/3/2022 1104  Gross per 24 hour   Intake 660 ml   Output 1000 ml   Net -340 ml       Prior to Admission medications    Medication Sig Start Date End Date Taking?  Authorizing Provider   furosemide (LASIX) 20 MG tablet Take 1 tablet by mouth daily 1/4/22  Yes Franky Roland MD   isosorbide mononitrate (IMDUR) 30 MG extended release tablet Take 1 tablet by mouth daily 1/3/22 2/2/22 Yes Francis Hidalgo MD   apixaban (ELIQUIS) 5 MG TABS tablet Take 1 tablet by mouth 2 times daily 1/2/22 2/1/22 Yes Francis Hidalgo MD   carvedilol (COREG) 12.5 MG tablet Take 1 tablet by mouth 2 times daily (with meals) 1/2/22 2/1/22 Yes Francis Hidalgo MD   sacubitril-valsartan (ENTRESTO) 24-26 MG per tablet Take 1 tablet by mouth 2 times daily 1/2/22 2/1/22 Yes Yuki Guidry MD   spironolactone (ALDACTONE) 25 MG tablet Take 1 tablet by mouth daily 1/3/22 2/2/22 Yes Yuki Guidry MD   tolterodine (DETROL) 2 MG tablet Take 2 mg by mouth nightly   Yes Historical Provider, MD   allopurinol (ZYLOPRIM) 100 MG tablet Take 100 mg by mouth 2 times daily   Yes Historical Provider, MD   amLODIPine (NORVASC) 5 MG tablet Take 5 mg by mouth daily    Yes Historical Provider, MD   aspirin 81 MG EC tablet Take 81 mg by mouth daily   Yes Historical Provider, MD   atorvastatin (LIPITOR) 20 MG tablet Take 20 mg by mouth daily   Yes Historical Provider, MD   Cyanocobalamin 1000 MCG LOZG Take 1,000 mcg by mouth daily   Yes Historical Provider, MD   levothyroxine (SYNTHROID) 100 MCG tablet Take 100 mcg by mouth Daily   Yes Historical Provider, MD   magnesium oxide (MAG-OX) 400 MG tablet Take 400 mg by mouth 2 times daily    Yes Historical Provider, MD   vitamin D 25 MCG (1000 UT) CAPS Take 1,000 Units by mouth daily    Yes Historical Provider, MD   ammonium lactate (LAC-HYDRIN) 12 % lotion Apply topically as needed for Dry Skin Apply topically as needed.     Historical Provider, MD   nitroglycerin (NITRO-BID) 2 % ointment Place 0.5 inches onto the skin as needed     Historical Provider, MD Aundrea Herron ON 1/4/2022] furosemide  20 mg Oral Daily    apixaban  5 mg Oral BID    carvedilol  12.5 mg Oral BID WC    isosorbide mononitrate  30 mg Oral Daily    levothyroxine  125 mcg Oral Daily    sacubitril-valsartan  1 tablet Oral BID    spironolactone  25 mg Oral Daily    sodium chloride flush 5-40 mL IntraVENous 2 times per day    allopurinol  100 mg Oral BID    aspirin  81 mg Oral Daily    atorvastatin  20 mg Oral Daily    vitamin B-12  1,000 mcg Oral Daily    magnesium oxide  400 mg Oral BID    trospium  20 mg Oral Nightly    Vitamin D  1,000 Units Oral Daily       TELEMETRY: Atrial fibrillation     Physical Exam:      Physical Exam  Constitutional:       General: She is not in acute distress. Appearance: She is obese. She is not diaphoretic. HENT:      Mouth/Throat:      Pharynx: No oropharyngeal exudate. Eyes:      General: No scleral icterus. Right eye: No discharge. Left eye: No discharge. Neck:      Thyroid: No thyromegaly. Vascular: No JVD. Cardiovascular:      Rate and Rhythm: Normal rate and regular rhythm. No extrasystoles are present. Heart sounds: Normal heart sounds, S1 normal and S2 normal. No murmur heard. No systolic murmur is present. No diastolic murmur is present. No friction rub. No gallop. No S3 or S4 sounds. Comments: No JVD  No edema    Pulmonary:      Effort: Pulmonary effort is normal. No respiratory distress. Breath sounds: Normal breath sounds. No wheezing or rales. Chest:      Chest wall: No tenderness. Abdominal:      General: Bowel sounds are normal. There is no distension. Palpations: Abdomen is soft. There is no mass. Tenderness: There is no abdominal tenderness. There is no guarding or rebound. Hernia: No hernia is present. Comments: No palpable organomegaly   Musculoskeletal:         General: Normal range of motion. Skin:     General: Skin is warm. Coloration: Skin is not pale. Findings: No rash. Neurological:      Mental Status: She is alert and oriented to person, place, and time. Cranial Nerves: No cranial nerve deficit.       Deep Tendon Reflexes: Reflexes normal.                 Lab Data:  CBC:   Recent Labs     01/01/22  0256 01/02/22  0128 01/03/22  0234   WBC aortic regurgitation. No stenosis. Mitral valve leaflets appear mildly thickened with mildly reduced leaflet  mobility. No stenosis. Mild mitral regurgitation. Mild tricuspid regurgitation. Mild pulmonic regurgitation. No significant pericardial effusion. Signature   ----------------------------------------------------------------  Electronically signed by Aravind Woods MD(Interpreting physician)  on 01/01/2022 04:40 PM  ----------------------------------------------------------------  M-Mode Measurements (cm)   LVIDd: 4.52 cm                         LVIDs: 3.74 cm  IVSd: 0.94 cm  LVPWd: 0.96 cm                         AO Root Dimension: 2 cm  % Ejection Fraction: 27 %              LA: 4.6 cm                                         LVOT: 2.4 cm  Doppler Measurements:   AV Peak Velocity:169 cm/s            MV Peak E-Wave: 94.7 cm/s  AV Peak Gradient: 11.42 mmHg         MV Peak A-Wave: 96 cm/s  AV Mean Gradient: 5 mmHg             MV E/A Ratio: 0.99 %  AV Area (Continuity):2.06 cm^2       MV Peak Gradient: 3.59 mmHg  TR Velocity:219 cm/s                 MV P1/2t: 153 msec  TR Gradient:19.18 mmHg               MVA by PHT1.44 cm^2  Estimated RAP:3 mmHg  RVSP:28 mmHg      XR CHEST PORTABLE    Result Date: 12/31/2021  Exam:   XR CHEST PORTABLE  Date:  12/31/2021 History:  Female, age  79 years; chest pain COMPARISON:  Chest x-ray dated November 17, 2021. Findings : Moderate cardiomegaly. Mediastinal wires appear intact. Right perihilar opacity, unchanged, may reflect prominent pulmonary vasculature. Coarsening of interstitium. Suspected trace right pleural effusion. The bones show no acute pathology. Impression: Cardiomegaly, coarsening of interstitium with trace right pleural effusion. Consider mild/early fluid overload.  Signed by Dr Lennox Lemus    Result Date: 1/2/2022  [place] Nuclear Stress Test Report Procedure date: 1/2/2022 Indications: chest pain Procedure: Stress was performed with injection of 0.4 mg Lexiscan. Vital signs and EKG were monitored. Technetium-99 sestamibi was injected in divided doses, approximately 10.3 mCi and 31.8 mCi respectively for rest and stress imaging. The patient was discharged in stable condition. Results: Patient had symptoms of dyspnea during infusion that resolved in recovery. Baseline EKG showed atrial fibrillation, left bundle branch block. During stress there were no significant EKG changes or rhythm changes. Baseline and peak blood pressures were 134/89, and 134/89 respectively. Baseline and peak heart rates were 87 and  107 respectively. EF=15% Lexiscan/[facility] Nuclear Medicine Report Date of Procedure: 1/2/2022 The patient was injected with 35.7 millicuries (mCi) of Technetium (Tc99m). After an appropriate level of stress the patient was re-injected with 49.2 millicuries (mCi) of Technetium (Tc99m). Repeat gated images were then performed per standard protocol. Findings: 1. Analysis of the the stress and rest images reveals large inferior to inferolateral and apical predominantly fixed defect. 2.  Analysis of the gated images reveals severely reduced left ventricular function with a calculated ejection fraction of 15 %. Impression: There is large inferior to inferolateral and apical infarct with minimal ana-infarct ischemia, with a calculated ejection fraction of 15 %. Suggest: Clinical correlation is advised. EKG with left bundle branch block. Signed by Dr Laurence Tanner and Plan: This is a 79 y. o. year old female with past medical history of coronary artery disease, prior four-vessel CABG 8/2017, prior ejection fraction 45 to 50%, possible early dementia/anxiety disorder, admitted with chest pain, negative troponins, echo with LV systolic function significantly depressed at 25 to 30% with paroxysmal atrial fibrillation, rate controlled. 1.  Mild increase in creatinine noted.   Can reduce Lasix to 20

## 2022-01-03 NOTE — CARE COORDINATION
Confirmed pt can admit to the facility today PENDING covid results and will require EMS transport for dc.      [facility] [address]: [phone] R:[phone]

## 2022-01-03 NOTE — PLAN OF CARE
Problem: Skin Integrity:  Goal: Will show no infection signs and symptoms  Description: Will show no infection signs and symptoms  Outcome: Ongoing  Goal: Absence of new skin breakdown  Description: Absence of new skin breakdown  Outcome: Ongoing     Problem: Falls - Risk of:  Goal: Will remain free from falls  Description: Will remain free from falls  Outcome: Ongoing  Goal: Absence of physical injury  Description: Absence of physical injury  Outcome: Ongoing   Electronically signed by Miguel Torrez RN on 1/3/2022 at 5:01 AM

## 2022-01-03 NOTE — PROGRESS NOTES
Physical Therapy    Facility/Department: [facility] PROGRESSIVE CARE  Initial Assessment    NAME: Jerald Guan  : 1940  MRN: [id number]    Date of Service: 1/3/2022    Discharge Recommendations:  Continue to assess pending progress,Patient would benefit from continued therapy after discharge        Assessment   Body structures, Functions, Activity limitations: Decreased functional mobility ; Decreased strength;Decreased ADL status; Decreased balance;Decreased safe awareness;Decreased endurance  Assessment: Pt WEAK OVERALL BUT ABLE TO STAND WITH ASSIST AND TAKE SMALL SIDESTEPS. WILL BENEFIT FROM CONTINUED THERAPY PRIOR TO RETURNING HOME. PT Education: PT Role;Plan of Care  REQUIRES PT FOLLOW UP: Yes  Activity Tolerance  Activity Tolerance: Patient Tolerated treatment well;Patient limited by fatigue       Patient Diagnosis(es): The primary encounter diagnosis was Acute chest pain. A diagnosis of Acute congestive heart failure, unspecified heart failure type [facility]) was also pertinent to this visit. has a past medical history of Arthritis, Bladder infection, CAD (coronary artery disease), Cardiovascular disease, Chronic kidney disease, Colon polyps, Constipation, Gallbladder disease, GERD (gastroesophageal reflux disease), Gout, Heart attack ([address] Heart disease, Hyperlipidemia, Hypertension, Incontinence, and Thyroid disease. has a past surgical history that includes Cholecystectomy; Hysterectomy; and Coronary artery bypass graft.     Restrictions  Restrictions/Precautions  Restrictions/Precautions: Fall Risk  Position Activity Restriction  Other position/activity restrictions: PUREWICK  Vision/Hearing  Vision: Within Functional Limits  Hearing: Within functional limits     Subjective  General  Diagnosis: CHF  Subjective  Subjective: \"I'LL PROBABLY BE DEAD WEIGHT\"  Pain Screening  Patient Currently in Pain: No (BUT DOES REPORT CHRONIC BACK PAIN AT TIMES)  Vital Signs  Patient Currently in Pain: No (BUT DOES REPORT CHRONIC BACK PAIN AT TIMES)       Orientation  Orientation  Overall Orientation Status: Within Functional Limits  Social/Functional History  Social/Functional History  Additional Comments: Pt HOME FOR A FEW DAYS WITH DTR AFTER BEING D/C FROM SNF. PLANS FOR ANOTHER SNF. Pt REPORTS USES RW.  Cognition        Objective     Observation/Palpation  Posture: Fair    PROM RLE (degrees)  RLE PROM: WNL  PROM LLE (degrees)  LLE PROM: WNL  Strength RLE  Comment: GROSSLY +3/5  Strength LLE  Comment: GROSSLY +3/5        Bed mobility  Supine to Sit: Moderate assistance  Sit to Supine: Moderate assistance  Transfers  Sit to Stand: Minimal Assistance; Moderate Assistance  Stand to sit: Minimal Assistance; Moderate Assistance  Comment: STOOD EOB X 2-3 MIN WITH MIN ASSIST. ABLE TO TAKE A FEW SMALL SIDE STEPS HOLDING TO STRAIGHT CHAIR.   Ambulation  Ambulation?: No     Balance  Sitting - Dynamic: Fair  Standing - Dynamic: Poor;+        Plan   Plan  Times per week: AT LEAST 5-6  Current Treatment Recommendations: Strengthening,Balance Training,Functional Mobility Training,Transfer Training,Gait Training,Safety Education & Training,Patient/Caregiver Education & Training  Safety Devices  Type of devices: Bed alarm in place,Call light within reach    G-Code       OutComes Score                                                  AM-PAC Score             Goals  Short term goals  Time Frame for Short term goals: 14 DAYS  Short term goal 1: BED MOB SBA  Short term goal 2: TRANSFERS SBA  Short term goal 3: ' RW SBA       Therapy Time   Individual Concurrent Group Co-treatment   Time In           Time Out           Minutes                   Geri Ruth, PT

## 2022-01-03 NOTE — CARE COORDINATION
Pt is accepted to [facility] and SW spoke with the pt who is also in agreement with the therapy rehab services. Awaiting response from [facility] admissions for pt admit today. Will need updated covid results prior to dc. [facility]: [phone] V:[phone]    Daniel Marie with therapy to request completed eval for services at the SNF and to assist with determining dc transport.

## 2022-01-03 NOTE — PROGRESS NOTES
[place], [place], [address]    DEPARTMENT OF HOSPITALIST MEDICINE      PROGRESS  NOTE:    NOTE: Portions of this note have been copied forward, however, changed to reflect the most current clinical status of this patient. Patient:  Amparo Hill  YOB: 1940  Date of Service: 1/2/2022  MRN: [id number]   Acct: [de-identified]   Primary Care Physician: Alecia Mckeon  Advance Directive: DNR-CC  Admit Date: 12/31/2021       Hospital Day: 2      CHIEF COMPLAINT:  Chief Complaint   Patient presents with    Chest Pain    Atrial Fibrillation       SUBJECTIVE:  Patient lying in bed during my morning rounds. She has been cleared by cardiology for discharge. She wants to go home but family believes that she is very weak and they want her to go to skilled nursing facility. CUMULATIVE  HOSPITAL  COURSE:    Patient is feeling better today. Shortness of breath has improved. She is not requiring any oxygen. Patient has large inferior/inferolateral wall motion defect consistent with prior infarction with EF around 15% on Lexiscan nuclear stress test that was done today. Patient has been cleared from cardiology standpoint to be discharged. Multiple med changes done by cardiology as reflected in the med rec sheet below. Patient needs very close follow-up with cardiology as an outpatient. She may need biventricular ICD placement as per cardiology recommendations from today as below:     01/01/2022  Patient is diuresing well on gentle IV diuretics. 2D echo is being done today which will be followed. Patient's TSH level was significantly high at 53.95 hence a free T4 level was done which was borderline at 1. I ordered 50 mcg of levothyroxine IV x1 dose and increase her Synthroid dosing to 125 mg orally daily. She is advised to take her thyroid medication first thing in the morning and wait for least 1 hour before taking any other medications or food, for maximum benefits.   Awaiting Constipation     Gallbladder disease     GERD (gastroesophageal reflux disease)     Gout     Heart [facility] ([facility] [address]     Heart disease     Hyperlipidemia     Hypertension     Incontinence     Thyroid disease          PAST SURGICAL HISTORY:  Past Surgical History:   Procedure Laterality Date    CHOLECYSTECTOMY      CORONARY ARTERY BYPASS GRAFT      heart bypass    HYSTERECTOMY          FAMILY HISTORY:  Family History   Problem Relation Age of Onset    Heart Attack Father     Cancer Mother            OBJECTIVE:  /78   Pulse 83   Temp 97.7 °F (36.5 °C) (Oral)   Resp 16   Wt 156 lb 5 oz (70.9 kg)   SpO2 94%   BMI 28.59 kg/m²   I/O this shift:   In: 18 [P.O.:480]  Out: 650 [Urine:650]    PHYSICAL  EXAMINATION:    KELLY:  Awake, alert, oriented x 3, patient appears tired and fatigued   Head/Eyes:  Normocephalic, atraumatic, EOMI and PERRLA bilaterally   Respiratory:   Bilateral decreased air entry in both lung fields, mild B/L crackles, scattered bilateral rales   Cardiovascular:  Regular rate and rhythm, S1+S2+0, no murmurs/rubs   Abdomen:   Soft, non-tender, bowel sounds +ve, no organomegaly   Extremities: Moves all, decreased range of motion, +1 bilateral lower leg edema   Neurologic: Awake, alert, oriented x 3, cranial nerves II-XII intact, no focal neurological deficits, sensory system intact   Psychiatric: Normal mood, non-suicidal       CURRENT MEDICATIONS:  Scheduled:   apixaban  5 mg Oral BID    carvedilol  12.5 mg Oral BID     isosorbide mononitrate  30 mg Oral Daily    levothyroxine  125 mcg Oral Daily    sacubitril-valsartan  1 tablet Oral BID    spironolactone  25 mg Oral Daily    sodium chloride flush  5-40 mL IntraVENous 2 times per day    allopurinol  100 mg Oral BID    aspirin  81 mg Oral Daily    atorvastatin  20 mg Oral Daily    vitamin B-12  1,000 mcg Oral Daily    magnesium oxide  400 mg Oral BID    trospium  20 mg Oral Nightly    Vitamin D  1,000 Units Oral Daily  furosemide  20 mg Oral BID        PRN:  acetaminophen, 650 mg, Q4H PRN  sodium chloride flush, 5-40 mL, PRN  sodium chloride, 25 mL, PRN  ondansetron, 4 mg, Q8H PRN   Or  ondansetron, 4 mg, Q6H PRN  polyethylene glycol, 17 g, Daily PRN  magnesium sulfate, 2,000 mg, PRN  potassium chloride, 40 mEq, PRN   Or  potassium alternative oral replacement, 40 mEq, PRN   Or  potassium chloride, 10 mEq, PRN        Infusions:   sodium chloride         Laboratory Data:  Recent Labs     12/31/21  1435 01/01/22  0256 01/02/22  0128   WBC 7.9 6.8 7.3   HGB 12.2 12.2 11.6*    327 301     Recent Labs     12/31/21  1435 01/01/22 0256 01/02/22  0128    140 140   K 4.4 4.2 4.3    106 104   CO2 24 22 23   BUN 17 18 21   CREATININE 1.0* 1.1* 1.2*   GLUCOSE 92 91 86     Recent Labs     12/31/21  1435 01/01/22  0256   AST 23 19   ALT 11 10   BILITOT 0.6 0.5   ALKPHOS 94 83     Troponin T:   Recent Labs     12/31/21  1435 12/31/21  2048 01/01/22  0942   TROPONINI 0.01 <0.01 0.01     Pro-BNP: No results for input(s): BNP in the last 72 hours. INR:   Recent Labs     12/31/21 1435   INR 1.05     UA:No results for input(s): NITRITE, COLORU, PHUR, LABCAST, WBCUA, RBCUA, MUCUS, TRICHOMONAS, YEAST, BACTERIA, CLARITYU, SPECGRAV, LEUKOCYTESUR, UROBILINOGEN, BILIRUBINUR, BLOODU, GLUCOSEU, AMORPHOUS in the last 72 hours. Invalid input(s): [place]  A1C: No results for input(s): LABA1C in the last 72 hours. ABG:No results for input(s): PHART, IMK9WDQ, PO2ART, QEN0IMA, BEART, HGBAE, T7PDXRUY, CARBOXHGBART in the last 72 hours. Imaging:    XR CHEST PORTABLE    Result Date: 12/31/2021  Impression: Cardiomegaly, coarsening of interstitium with trace right pleural effusion. Consider mild/early fluid overload.  Signed by Dr Myra Mcburney:    Principal Problem:    CHF (congestive heart failure), NYHA class I, acute on chronic, combined ([address]  Active Problems:    Coronary artery disease involving native coronary artery of native heart without angina pectoris    Left bundle branch block    Benign hypertension    Hyperlipidemia    Palliative care patient    Parkinsonism ([address]    Generalized weakness    Essential hypertension    Hypothyroidism    Arthritis    Chronic kidney disease    Constipation    Gallbladder disease    GERD (gastroesophageal reflux disease)    Gout    Heart disease    Incontinence  Resolved Problems:    Chest pain in adult      Continue with current medications  Cardiac enzymes x3 were done which have been normal  Continue with cardiopulmonary monitoring  Cardiology consult and recommendations reviewed, appreciated and agreed with  Cardiology has multiple med adjustments done for the patient  Patient had [place] nuclear stress test done which showed severely depressed EF of 15%  Cardiology wants to follow-up with her as an outpatient for probable biventricular ICD placement  Strict I's and O's  Daily weights  Patient is a TSH level was significantly elevated at 53.95 and free TSH level was borderline at 1  Patient given 50 mcg IV levothyroxine on 1/1/2022 and Synthroid increased from 100 to 125 mcg p.o. daily  Strictly advised patient to take thyroid medication first thing in the morning and wait for at least 1 hour before taking any other medications or food  Recheck thyroid function test in a.m. Patient cleared by cardiology to be discharged home with home health care  PT/OT evaluation ordered  Case management consult for DC planning      Chronic medical issues . .. Continue with home meds. Monitor patient closely while admitted. Advised very close f/u with patient's PCP as an outpatient to address chronic medical issues. Repeat labs in a.m. Electrolyte replacement as per protocol. Patient will be monitored very closely on the floor. Further recommendations as per the hospital course. Discharge Plan:  She has been cleared by cardiology for discharge.  She wants to go home but family believes that she is very weak and they want her to go to skilled nursing facility. Patient  is on DVT prophylaxis  Current medications reviewed  Lab work reviewed  Radiology/Chest x-ray films reviewed  Treatment recommendations from suspecialities reviewed, appreciated and agreed with  Discussed with the nurse and addressed all questions/concerns  Discussed with Patient and/or Family at the bedside in detail . .. they understand and agree with the management plan. Glenroy Abrams MD  1/2/2022 7:36 PM      DISCLAIMER: This note was created with electronic voice recognition which does have occasional errors. If you have any questions regarding the content within the note please do not hesitate to contact me. .. Thanks.

## 2022-01-03 NOTE — CARE COORDINATION
Home Health referral received. This patient has been accepted to [facility] for rehab, and is planning to discharge from hospital once covid results are back. Will sign off.    Electronically signed by Surekha Allen RN on 1/3/22 at 3:56 PM CST

## 2022-01-03 NOTE — PROGRESS NOTES
Report called to Ted HOLT at [facility]. EMS notified for transport.      Electronically signed by Jose Maria Álvarez RN on 1/3/2022 at 4:17 PM

## 2022-01-04 NOTE — DISCHARGE SUMMARY
cardiology as an outpatient. She may need biventricular ICD placement as per cardiology recommendations from today as below:         01/01/2022  Patient is diuresing well on gentle IV diuretics. 2D echo is being done today which will be followed. Patient's TSH level was significantly high at 53.95 hence a free T4 level was done which was borderline at 1. I ordered 50 mcg of levothyroxine IV x1 dose and increase her Synthroid dosing to 125 mg orally daily. She is advised to take her thyroid medication first thing in the morning and wait for least 1 hour before taking any other medications or food, for maximum benefits. Awaiting cardiology evaluation.     12/31/2021  History Of Present Illness: The patient is a 79 y. o. female who presented to [address] [place] [address] ED complaining of chest pain and shortness of breath. Pt was discharged from [facility] days ago currently living with her daughter. She has history of CHF, CKD, HTN, parkinsonism and CAD s/p CABG. She is a poor historian and history of present illness is obtained primarily from her daughter. Pt's daughter tells me that mom has had increasing shortness of breath over past couple of days. Pt is said to mostly be in bed or wheelchair able to stand to bedside commode however over past couple of days she has been short of breath getting oob to bedside commode. She has had report of vague chest pain. She has history of CHF per daughter who relates that patient was not continued on diuretic medication at recent discharge from hospital. Pt was discharged 11/17 to nursing home after evaluation of altered mental status, hypertension, visual hallucinations with acute worsening of renal function per daughter. Troponin and EKG were negative for ACS.  She was noted to be volume overloaded in the ED and given lasix 40mg IV admitted for further evaluation and treatment.    OBJECTIVE:  /61   Pulse 82   Temp 97.5 °F (36.4 °C) (Temporal)   Resp 16   Wt 155 lb 11.2 oz (70.6 kg)   SpO2 94%   BMI 28.48 kg/m²       Heart: RRR   Lungs: Bilateral decreased air entry, scattered bilateral rales    Abdomen: Soft, non-tender   Extremities: + Trace bilateral pitting edema   Neurologic: Alert and oriented   Skin: Warm and dry          Laboratory Data:  Recent Labs     01/01/22  0256 01/02/22  0128 01/03/22  0234   WBC 6.8 7.3 8.2   HGB 12.2 11.6* 11.2*    301 313     Recent Labs     01/01/22  0256 01/02/22  0128 01/03/22  0234    140 136   K 4.2 4.3 4.5    104 99   CO2 22 23 23   BUN 18 21 30*   CREATININE 1.1* 1.2* 1.5*  1.5*   GLUCOSE 91 86 127*     Recent Labs     01/01/22  0256   AST 19   ALT 10   BILITOT 0.5   ALKPHOS 83     Troponin T:   Recent Labs     12/31/21 2048 01/01/22  0942   TROPONINI <0.01 0.01     Pro-BNP: No results for input(s): BNP in the last 72 hours. INR:   No results for input(s): INR in the last 72 hours. UA:No results for input(s): NITRITE, COLORU, PHUR, LABCAST, WBCUA, RBCUA, MUCUS, TRICHOMONAS, YEAST, BACTERIA, CLARITYU, SPECGRAV, LEUKOCYTESUR, UROBILINOGEN, BILIRUBINUR, BLOODU, GLUCOSEU, AMORPHOUS in the last 72 hours. Invalid input(s): [address]  A1C: No results for input(s): LABA1C in the last 72 hours. ABG:No results for input(s): PHART, NTC3FQQ, PO2ART, LCT7EOT, BEART, HGBAE, D3JYNUCY, CARBOXHGBART in the last 72 hours. Impressions of imaging performed in 48 hours before discharge:    NM MYOCARDIAL SPECT REST EXERCISE OR RX    Result Date: 1/2/2022  Impression: There is large inferior to inferolateral and apical infarct with minimal ana-infarct ischemia, with a calculated ejection fraction of 15 %. Suggest: Clinical correlation is advised. EKG with left bundle branch block.  Signed by Dr Andra Valentin          Medication List      START taking these medications    apixaban 5 MG Tabs tablet  Commonly known as: ELIQUIS  Take 1 tablet by mouth 2 times daily     carvedilol 12.5 MG tablet  Commonly known as: COREG  Take 1 tablet by mouth 2 times daily (with meals)     furosemide 20 MG tablet  Commonly known as: LASIX  Take 1 tablet by mouth daily  Start taking on: January 4, 2022     isosorbide mononitrate 30 MG extended release tablet  Commonly known as: IMDUR  Take 1 tablet by mouth daily     sacubitril-valsartan 24-26 MG per tablet  Commonly known as: ENTRESTO  Take 1 tablet by mouth 2 times daily     spironolactone 25 MG tablet  Commonly known as: ALDACTONE  Take 1 tablet by mouth daily        CONTINUE taking these medications    allopurinol 100 MG tablet  Commonly known as: ZYLOPRIM     amLODIPine 5 MG tablet  Commonly known as: NORVASC     ammonium lactate 12 % lotion  Commonly known as: LAC-HYDRIN     aspirin 81 MG EC tablet     atorvastatin 20 MG tablet  Commonly known as: LIPITOR     Cyanocobalamin 1000 MCG Lozg     levothyroxine 100 MCG tablet  Commonly known as: SYNTHROID     magnesium oxide 400 MG tablet  Commonly known as: MAG-OX     nitro-bid 2 % ointment  Generic drug: nitroglycerin     tolterodine 2 MG tablet  Commonly known as: DETROL     vitamin D 25 MCG (1000 UT) Caps        STOP taking these medications    DULoxetine 30 MG extended release capsule  Commonly known as: CYMBALTA     losartan 25 MG tablet  Commonly known as: COZAAR           Where to Get Your Medications      These medications were sent to [facility] [place] [address] [address]    Phone: [phone]   · apixaban 5 MG Tabs tablet  · carvedilol 12.5 MG tablet  · furosemide 20 MG tablet  · isosorbide mononitrate 30 MG extended release tablet  · sacubitril-valsartan 24-26 MG per tablet  · spironolactone 25 MG tablet           ISSUES TO BE ADDRESSED AT HOSPITAL FOLLOW-UP VISIT:    Advised patient to follow-up closely with PCP upon discharge for management of chronic medical issues  Please see the detailed discharge directions delivered from earlier today! Condition on Discharge: gradually improving  Discharge Disposition: Skilled nursing facility    Recommended Follow Up:  No follow-up provider specified. Followup Appointments Scheduled at Time of Discharge:  Future Appointments   Date Time Provider Jennifer Brennan   3/28/2022  2:30 PM DO FLAKO Moreno [id number]        Discharge Instructions:   Please see the discharge paperwork. Patient was seen at bedside today, and the examination shows improvement since yesterday. Detailed discharge directions delivered to the patient by myself and our nursing staff, who verbalizes understanding and is very happy and satisfied with the plan. Patient has been advised to continue all medications as prescribed and advised, and f/u with PCP within 1 week. Patient is stable from medical standpoint to be discharged. Total time spent during patient evaluation and assessment, discussion with the nurse/family, addressing discharge medications/scripts and coordination of care for safe discharge was in excess of 35 minutes.       Signed Electronically:    Kristine Hatch MD  8:02 PM 1/3/2022

## 2022-01-05 ENCOUNTER — TELEPHONE (OUTPATIENT)
Dept: CARDIOLOGY CLINIC | Age: 82
End: 2022-01-05

## 2022-01-05 NOTE — TELEPHONE ENCOUNTER
Pharm called stating they got a script from Dr. Dany Schilling for Lasix 20 mg 1 qd. Then they got a script from Dr. Mukesh Orlando for Lasix 20 mg BID. pls advise.

## 2022-01-25 ENCOUNTER — TELEPHONE (OUTPATIENT)
Dept: CARDIOLOGY CLINIC | Age: 82
End: 2022-01-25

## 2022-01-25 ENCOUNTER — OFFICE VISIT (OUTPATIENT)
Dept: CARDIOLOGY CLINIC | Age: 82
End: 2022-01-25
Payer: MEDICARE

## 2022-01-25 VITALS
HEART RATE: 54 BPM | WEIGHT: 155 LBS | OXYGEN SATURATION: 97 % | SYSTOLIC BLOOD PRESSURE: 108 MMHG | HEIGHT: 62 IN | DIASTOLIC BLOOD PRESSURE: 68 MMHG | BODY MASS INDEX: 28.52 KG/M2

## 2022-01-25 DIAGNOSIS — I25.10 CORONARY ARTERY DISEASE INVOLVING NATIVE CORONARY ARTERY OF NATIVE HEART WITHOUT ANGINA PECTORIS: ICD-10-CM

## 2022-01-25 DIAGNOSIS — I10 ESSENTIAL HYPERTENSION: Primary | ICD-10-CM

## 2022-01-25 DIAGNOSIS — I50.22 CHRONIC SYSTOLIC CONGESTIVE HEART FAILURE (HCC): ICD-10-CM

## 2022-01-25 DIAGNOSIS — E78.5 HYPERLIPIDEMIA, UNSPECIFIED HYPERLIPIDEMIA TYPE: ICD-10-CM

## 2022-01-25 PROCEDURE — 1123F ACP DISCUSS/DSCN MKR DOCD: CPT | Performed by: NURSE PRACTITIONER

## 2022-01-25 PROCEDURE — G8400 PT W/DXA NO RESULTS DOC: HCPCS | Performed by: NURSE PRACTITIONER

## 2022-01-25 PROCEDURE — G8427 DOCREV CUR MEDS BY ELIG CLIN: HCPCS | Performed by: NURSE PRACTITIONER

## 2022-01-25 PROCEDURE — 1090F PRES/ABSN URINE INCON ASSESS: CPT | Performed by: NURSE PRACTITIONER

## 2022-01-25 PROCEDURE — 4040F PNEUMOC VAC/ADMIN/RCVD: CPT | Performed by: NURSE PRACTITIONER

## 2022-01-25 PROCEDURE — 99214 OFFICE O/P EST MOD 30 MIN: CPT | Performed by: NURSE PRACTITIONER

## 2022-01-25 PROCEDURE — 1036F TOBACCO NON-USER: CPT | Performed by: NURSE PRACTITIONER

## 2022-01-25 PROCEDURE — 1111F DSCHRG MED/CURRENT MED MERGE: CPT | Performed by: NURSE PRACTITIONER

## 2022-01-25 PROCEDURE — G8417 CALC BMI ABV UP PARAM F/U: HCPCS | Performed by: NURSE PRACTITIONER

## 2022-01-25 PROCEDURE — G8484 FLU IMMUNIZE NO ADMIN: HCPCS | Performed by: NURSE PRACTITIONER

## 2022-01-25 ASSESSMENT — ENCOUNTER SYMPTOMS
WHEEZING: 0
CHEST TIGHTNESS: 0
SHORTNESS OF BREATH: 0
COUGH: 0
SORE THROAT: 0

## 2022-01-25 NOTE — TELEPHONE ENCOUNTER
Left message with Adeline Quiñonez letting her know that Reji James would like the patient to be placed on a low sodium diet, limited to 1200 cc's of water a day, and she would like her feet elevated while the patient is sitting throughout the day. She will forward information to patients nurse.        Also requested a faxed copy of patients medication list.

## 2022-01-25 NOTE — PROGRESS NOTES
[id number] [facility] Cardiology   Established Patient Office Visit   Yvette vd. [id number] [facility]  [phone]        OFFICE VISIT:  2022    Laurence Bowdenal - : 1940    Reason For Visit:  Terrie Schneider is a 79 y.o. female who is here for Follow-up    1. Essential hypertension    2. Hyperlipidemia, unspecified hyperlipidemia type    3. Coronary artery disease involving native coronary artery of native heart without angina pectoris    4. Chronic systolic congestive heart failure ([address]      Patient with a history of coronary artery disease/CABG, hypertension, and hyperlipidemia.     She is a patient of Dr Luba Magana  Patient with history of coronary artery disease/CABG . Possible early dementia, patient presented to the ER on 2021 with chest pain. Negative troponins. Echo showed 25 to 30%. Was in paroxysmal atrial fib but rate controlled. Lexiscan and 2D echo done. Worden Cuong showed large inferior to inferior lateral predominantly from prior infarction. Recommendation to manage medically. Imdur was added. LV appears borderline dilated with severely reduced LV systolic function. LV ejection fraction estimated at 25 to 30%. Probable diastolic dysfunction but difficult to assess accurately due to rhythm. Mild concentric LVH. RV appears normal in size with preserved systolic function. Mild left atrial enlargement. Normal right atrial size. Aortic valve appears trileaflet with moderate leaflet thickening but preserved mobility. Mild aortic regurgitation. No stenosis. Mitral valve leaflets appear mildly thickened with mildly reduced leaflet mobility. No stenosis. Mild mitral regurgitation. Mild tricuspid regurgitation. Mild pulmonic regurgitation. No significant pericardial effusion. Patient tolerating Entresto and Coreg. Patient was switched to Eliquis 5 mg twice daily.   Recommendation from cardiology to recheck 2D echo in 3 months and if the EF still reduced to 35% there would be consideration for biventricular ICD. Patient presents to clinic today for follow-up. Patient accompanied by daughter. Patient currently resides at [facility]. Only complaint/concern daughter has for her mother is at that she has noted some lower extremity edema. In discussing further with patient and daughter she sits a lot in a wheelchair at the facility. Did discuss with patient and daughter the importance of keeping feet elevated when she is sitting or at night when she is laying down. Recommended getting patient a footstool. And will contact [facility] regarding a low-sodium diet as well as keeping patient's feet elevated when she is sitting down. Patient denies any chest pain, pressure or tightness. There is no shortness of breath, orthopnea or PND.   Patient denies any lightheadedness, dizziness or syncope        Subjective    Nancy Mobley is a 79 y.o. female with the [email or web address]:    Patient Active Problem List    Diagnosis Date Noted    Arthritis 01/02/2022    Bladder infection 01/02/2022    Chronic kidney disease 01/02/2022    Colon polyps 01/02/2022    Constipation 01/02/2022    Gallbladder disease 01/02/2022    GERD (gastroesophageal reflux disease) 01/02/2022    Gout 01/02/2022    Heart attack ([address] 01/02/2022    Heart disease 01/02/2022    Incontinence 01/02/2022    Hypothyroidism 01/01/2022    CHF (congestive heart failure), NYHA class I, acute on chronic, combined ([address] 12/31/2021    Acute encephalopathy     Visual hallucinations     Parkinsonism ([address]     Generalized weakness     Essential hypertension     Palliative care patient 11/18/2021    Altered mental status 11/17/2021    Coronary artery disease involving native coronary artery of native heart without angina pectoris 03/08/2021    Left bundle branch block 03/08/2021    Benign hypertension 03/08/2021    Hyperlipidemia 03/08/2021    Secondary hypertension 02/08/2021 Current Outpatient Medications   Medication Sig Dispense Refill    furosemide (LASIX) 20 MG tablet Take 1 tablet by mouth daily (Patient taking differently: Take 20 mg by mouth daily Take one tablet daily) 60 tablet 3    isosorbide mononitrate (IMDUR) 30 MG extended release tablet Take 1 tablet by mouth daily 30 tablet 0    apixaban (ELIQUIS) 5 MG TABS tablet Take 1 tablet by mouth 2 times daily 60 tablet 0    carvedilol (COREG) 12.5 MG tablet Take 1 tablet by mouth 2 times daily (with meals) 60 tablet 0    sacubitril-valsartan (ENTRESTO) 24-26 MG per tablet Take 1 tablet by mouth 2 times daily 60 tablet 0    spironolactone (ALDACTONE) 25 MG tablet Take 1 tablet by mouth daily 30 tablet 0    tolterodine (DETROL) 2 MG tablet Take 2 mg by mouth nightly      allopurinol (ZYLOPRIM) 100 MG tablet Take 100 mg by mouth 2 times daily      amLODIPine (NORVASC) 5 MG tablet Take 5 mg by mouth daily       ammonium lactate (LAC-HYDRIN) 12 % lotion Apply topically as needed for Dry Skin Apply topically as needed.  aspirin 81 MG EC tablet Take 81 mg by mouth daily      atorvastatin (LIPITOR) 20 MG tablet Take 20 mg by mouth daily      Cyanocobalamin 1000 MCG LOZG Take 1,000 mcg by mouth daily      levothyroxine (SYNTHROID) 100 MCG tablet Take 100 mcg by mouth Daily      magnesium oxide (MAG-OX) 400 MG tablet Take 400 mg by mouth 2 times daily       nitroglycerin (NITRO-BID) 2 % ointment Place 0.5 inches onto the skin as needed       vitamin D 25 MCG (1000 UT) CAPS Take 1,000 Units by mouth daily        No current facility-administered medications for this visit. Allergies: Patient has no known allergies.   Past Medical History:   Diagnosis Date    Arthritis     Bladder infection     CAD (coronary artery disease)     Cardiovascular disease     Chronic kidney disease     Colon polyps     Constipation     Gallbladder disease     GERD (gastroesophageal reflux disease)     Gout     Heart [address] ([facility] [address]     Heart disease     Hyperlipidemia     Hypertension     Incontinence     Thyroid disease      Past Surgical History:   Procedure Laterality Date    CHOLECYSTECTOMY      CORONARY ARTERY BYPASS GRAFT      heart bypass    HYSTERECTOMY       Family History   Problem Relation Age of Onset    Heart Attack Father     Cancer Mother      Social History     Tobacco Use    Smoking status: Never Smoker    Smokeless tobacco: Never Used   Substance Use Topics    Alcohol use: Not Currently          Review of Systems:    Review of Systems   Constitutional: Negative for activity change, chills, diaphoresis, fatigue and fever. HENT: Negative for congestion and sore throat. Respiratory: Negative for cough, chest tightness, shortness of breath and wheezing. Cardiovascular: Positive for leg swelling (trace to minimal ). Negative for chest pain. Neurological: Negative for dizziness, syncope, light-headedness and headaches. Psychiatric/Behavioral: Negative for confusion. The patient is not nervous/anxious. Objective:    /68   Pulse 54   Ht 5' 2\" (1.575 m)   Wt 155 lb (70.3 kg)   SpO2 97%   BMI 28.35 kg/m²     GENERAL - well developed and well nourished, conversant  HEENT   PERRLA, Hearing appears normal, gentleman lids are normal.  External inspection of ears and nose appear normal.  NECK - no thyromegaly, no JVD, trachea is in the midline  CARDIOVASCULAR  PMI is in the mid line clavicular position, Normal S1 and S2 with a grade 1/6 systolic murmur. No S3 or S4    PULMONARY  no respiratory distress. No wheezes or rales. Lungs are clear to ausculation, normal respiratory effort. ABDOMEN   soft, non tender, no rebound  MUSCULOSKELETAL   range of motion of the upper and lower extermites appears normal and equal and is without pain   EXTREMITIES - no significant edema   NEUROLOGIC  gait and station are normal  SKIN - turgor is normal, can warm and dry.   PSYCHIATRIC - normal mood and affect, alert and orientated x 3,      ASSESSMENT:    ALL THE CARDIOLOGY PROBLEMS ARE LISTED ABOVE; HOWEVER, THE FOLLOWING SPECIFIC CARDIAC PROBLEMS / CONDITIONS WERE ADDRESSED AND TREATED DURING THE OFFICE VISIT TODAY:                                                                                            MEDICAL DECISION MAKING             Cardiac Specific Problem / Diagnosis  Discussion and Data Reviewed Diagnostic Procedures Ordered Management Options Selected           1. CAD  Stable   Review and summation of old records:    No chest pain. Patient is on Coreg, Imdur, aspirin, Lipitor. No Continue current medications:     Yes           2. Paroxysmal atrial fib  Stable   Patient is on Eliquis 5 mg twice daily for stroke prevention. Coreg 12.5 mg twice daily. No Continue current medications:    Yes           3. Chronic systolic congestive heart failure Stable  trace to mild lower extremity edema. Did discuss importance of keeping legs elevated, low-sodium diet, and watching fluid intake. Patient is on Lasix 20 mg daily. Coreg 12.5 mg twice daily. Entresto 1 tablet twice daily. Spironolactone 25 mg daily. Recommend on return to clinic in March to obtain/order the 2D echo to evaluate current EF and consideration for ICD placement. No Continue current medications:       Yes           4. Hypertension Well Controlled  patient is on Norvasc 5 mg daily. Coreg 12.5 mg twice daily. No Continue current medications:       Yes     No orders of the defined types were placed in this encounter. No orders of the defined types were placed in this encounter. Discussed with patient and adult child. Return for Keep appt with Dr Tony Almeida. I greatly appreciate the opportunity to meet Mitzi Rawls and your confidence in allowing me to participate in her cardiovascular care.     GIAN Morris NP  1/25/2022 10:57 AM CST                    This dictation was generated by voice recognition computer software. Although all attempts are made to edit dictation for accuracy, there may be errors in the transcription that are not intended.

## 2022-03-13 ENCOUNTER — HOSPITAL ENCOUNTER (INPATIENT)
Age: 82
LOS: 12 days | Discharge: NURSING, REHAB OR LONG-TERM CARE | DRG: 291 | End: 2022-03-25
Attending: EMERGENCY MEDICINE | Admitting: INTERNAL MEDICINE
Payer: MEDICARE

## 2022-03-13 ENCOUNTER — APPOINTMENT (OUTPATIENT)
Dept: GENERAL RADIOLOGY | Age: 82
DRG: 291 | End: 2022-03-13
Payer: MEDICARE

## 2022-03-13 ENCOUNTER — APPOINTMENT (OUTPATIENT)
Dept: CT IMAGING | Age: 82
DRG: 291 | End: 2022-03-13
Payer: MEDICARE

## 2022-03-13 DIAGNOSIS — N17.9 AKI (ACUTE KIDNEY INJURY) (HCC): ICD-10-CM

## 2022-03-13 DIAGNOSIS — R06.89 DYSPNEA AND RESPIRATORY ABNORMALITIES: ICD-10-CM

## 2022-03-13 DIAGNOSIS — R53.83 OTHER FATIGUE: ICD-10-CM

## 2022-03-13 DIAGNOSIS — Z79.01 CHRONIC ANTICOAGULATION: ICD-10-CM

## 2022-03-13 DIAGNOSIS — I50.23 ACUTE ON CHRONIC SYSTOLIC CONGESTIVE HEART FAILURE (HCC): Primary | ICD-10-CM

## 2022-03-13 DIAGNOSIS — R06.00 DYSPNEA AND RESPIRATORY ABNORMALITIES: ICD-10-CM

## 2022-03-13 DIAGNOSIS — I48.20 CHRONIC A-FIB (HCC): ICD-10-CM

## 2022-03-13 LAB
ALBUMIN SERPL-MCNC: 4.1 G/DL (ref 3.5–5.2)
ALP BLD-CCNC: 90 U/L (ref 35–104)
ALT SERPL-CCNC: 11 U/L (ref 5–33)
ANION GAP SERPL CALCULATED.3IONS-SCNC: 14 MMOL/L (ref 7–19)
ANION GAP SERPL CALCULATED.3IONS-SCNC: 15 MMOL/L (ref 7–19)
AST SERPL-CCNC: 17 U/L (ref 5–32)
BASOPHILS ABSOLUTE: 0 K/UL (ref 0–0.2)
BASOPHILS RELATIVE PERCENT: 0.5 % (ref 0–1)
BILIRUB SERPL-MCNC: 0.7 MG/DL (ref 0.2–1.2)
BILIRUBIN URINE: NEGATIVE
BLOOD, URINE: NEGATIVE
BUN BLDV-MCNC: 46 MG/DL (ref 8–23)
BUN BLDV-MCNC: 47 MG/DL (ref 8–23)
CALCIUM SERPL-MCNC: 10.2 MG/DL (ref 8.8–10.2)
CALCIUM SERPL-MCNC: 9.9 MG/DL (ref 8.8–10.2)
CHLORIDE BLD-SCNC: 101 MMOL/L (ref 98–111)
CHLORIDE BLD-SCNC: 102 MMOL/L (ref 98–111)
CHOLESTEROL, TOTAL: 111 MG/DL (ref 160–199)
CLARITY: CLEAR
CO2: 19 MMOL/L (ref 22–29)
CO2: 21 MMOL/L (ref 22–29)
COLOR: YELLOW
CREAT SERPL-MCNC: 1.7 MG/DL (ref 0.5–0.9)
CREAT SERPL-MCNC: 1.8 MG/DL (ref 0.5–0.9)
EOSINOPHILS ABSOLUTE: 0.1 K/UL (ref 0–0.6)
EOSINOPHILS RELATIVE PERCENT: 1.6 % (ref 0–5)
GFR AFRICAN AMERICAN: 33
GFR AFRICAN AMERICAN: 35
GFR NON-AFRICAN AMERICAN: 27
GFR NON-AFRICAN AMERICAN: 29
GLUCOSE BLD-MCNC: 125 MG/DL (ref 74–109)
GLUCOSE BLD-MCNC: 97 MG/DL (ref 74–109)
GLUCOSE URINE: NEGATIVE MG/DL
HCT VFR BLD CALC: 32 % (ref 37–47)
HCT VFR BLD CALC: 35.2 % (ref 37–47)
HDLC SERPL-MCNC: 53 MG/DL (ref 65–121)
HEMOGLOBIN: 10.6 G/DL (ref 12–16)
HEMOGLOBIN: 9.6 G/DL (ref 12–16)
IMMATURE GRANULOCYTES #: 0 K/UL
INR BLD: 1.86 (ref 0.88–1.18)
IRON SATURATION: 9 % (ref 14–50)
IRON: 21 UG/DL (ref 37–145)
KETONES, URINE: NEGATIVE MG/DL
LDL CHOLESTEROL CALCULATED: 50 MG/DL
LEUKOCYTE ESTERASE, URINE: NEGATIVE
LYMPHOCYTES ABSOLUTE: 0.9 K/UL (ref 1.1–4.5)
LYMPHOCYTES RELATIVE PERCENT: 14 % (ref 20–40)
MAGNESIUM: 2.6 MG/DL (ref 1.6–2.4)
MCH RBC QN AUTO: 27.2 PG (ref 27–31)
MCH RBC QN AUTO: 27.2 PG (ref 27–31)
MCHC RBC AUTO-ENTMCNC: 30 G/DL (ref 33–37)
MCHC RBC AUTO-ENTMCNC: 30.1 G/DL (ref 33–37)
MCV RBC AUTO: 90.5 FL (ref 81–99)
MCV RBC AUTO: 90.7 FL (ref 81–99)
MONOCYTES ABSOLUTE: 0.7 K/UL (ref 0–0.9)
MONOCYTES RELATIVE PERCENT: 11.2 % (ref 0–10)
NEUTROPHILS ABSOLUTE: 4.7 K/UL (ref 1.5–7.5)
NEUTROPHILS RELATIVE PERCENT: 72.4 % (ref 50–65)
NITRITE, URINE: NEGATIVE
PDW BLD-RTO: 16.4 % (ref 11.5–14.5)
PDW BLD-RTO: 16.6 % (ref 11.5–14.5)
PH UA: 6 (ref 5–8)
PLATELET # BLD: 174 K/UL (ref 130–400)
PLATELET # BLD: 197 K/UL (ref 130–400)
PMV BLD AUTO: 10.5 FL (ref 9.4–12.3)
PMV BLD AUTO: 11.1 FL (ref 9.4–12.3)
POTASSIUM SERPL-SCNC: 4.7 MMOL/L (ref 3.5–5)
POTASSIUM SERPL-SCNC: 5 MMOL/L (ref 3.5–5)
PRO-BNP: ABNORMAL PG/ML (ref 0–1800)
PROTEIN UA: NEGATIVE MG/DL
PROTHROMBIN TIME: 21.4 SEC (ref 12–14.6)
RBC # BLD: 3.53 M/UL (ref 4.2–5.4)
RBC # BLD: 3.89 M/UL (ref 4.2–5.4)
SARS-COV-2, NAAT: NOT DETECTED
SODIUM BLD-SCNC: 136 MMOL/L (ref 136–145)
SODIUM BLD-SCNC: 136 MMOL/L (ref 136–145)
SPECIFIC GRAVITY UA: 1.01 (ref 1–1.03)
T4 FREE: 1.39 NG/DL (ref 0.93–1.7)
TOTAL IRON BINDING CAPACITY: 225 UG/DL (ref 250–400)
TOTAL PROTEIN: 7.5 G/DL (ref 6.6–8.7)
TRIGL SERPL-MCNC: 40 MG/DL (ref 0–149)
TROPONIN: <0.01 NG/ML (ref 0–0.03)
TSH REFLEX FT4: 17.94 UIU/ML (ref 0.35–5.5)
UROBILINOGEN, URINE: 1 E.U./DL
WBC # BLD: 6 K/UL (ref 4.8–10.8)
WBC # BLD: 6.4 K/UL (ref 4.8–10.8)

## 2022-03-13 PROCEDURE — 80053 COMPREHEN METABOLIC PANEL: CPT

## 2022-03-13 PROCEDURE — 71045 X-RAY EXAM CHEST 1 VIEW: CPT

## 2022-03-13 PROCEDURE — 99283 EMERGENCY DEPT VISIT LOW MDM: CPT

## 2022-03-13 PROCEDURE — 36415 COLL VENOUS BLD VENIPUNCTURE: CPT

## 2022-03-13 PROCEDURE — 96374 THER/PROPH/DIAG INJ IV PUSH: CPT

## 2022-03-13 PROCEDURE — 2140000000 HC CCU INTERMEDIATE R&B

## 2022-03-13 PROCEDURE — 2580000003 HC RX 258

## 2022-03-13 PROCEDURE — 80061 LIPID PANEL: CPT

## 2022-03-13 PROCEDURE — 85610 PROTHROMBIN TIME: CPT

## 2022-03-13 PROCEDURE — 51702 INSERT TEMP BLADDER CATH: CPT

## 2022-03-13 PROCEDURE — 83880 ASSAY OF NATRIURETIC PEPTIDE: CPT

## 2022-03-13 PROCEDURE — 84443 ASSAY THYROID STIM HORMONE: CPT

## 2022-03-13 PROCEDURE — 85025 COMPLETE CBC W/AUTO DIFF WBC: CPT

## 2022-03-13 PROCEDURE — 84484 ASSAY OF TROPONIN QUANT: CPT

## 2022-03-13 PROCEDURE — 83550 IRON BINDING TEST: CPT

## 2022-03-13 PROCEDURE — 81003 URINALYSIS AUTO W/O SCOPE: CPT

## 2022-03-13 PROCEDURE — 6360000002 HC RX W HCPCS: Performed by: EMERGENCY MEDICINE

## 2022-03-13 PROCEDURE — 83735 ASSAY OF MAGNESIUM: CPT

## 2022-03-13 PROCEDURE — 84439 ASSAY OF FREE THYROXINE: CPT

## 2022-03-13 PROCEDURE — 93005 ELECTROCARDIOGRAM TRACING: CPT | Performed by: EMERGENCY MEDICINE

## 2022-03-13 PROCEDURE — 85027 COMPLETE CBC AUTOMATED: CPT

## 2022-03-13 PROCEDURE — 6370000000 HC RX 637 (ALT 250 FOR IP)

## 2022-03-13 PROCEDURE — 83540 ASSAY OF IRON: CPT

## 2022-03-13 PROCEDURE — 74176 CT ABD & PELVIS W/O CONTRAST: CPT

## 2022-03-13 PROCEDURE — 87635 SARS-COV-2 COVID-19 AMP PRB: CPT

## 2022-03-13 RX ORDER — TROSPIUM CHLORIDE 20 MG/1
20 TABLET, FILM COATED ORAL NIGHTLY
Status: DISCONTINUED | OUTPATIENT
Start: 2022-03-13 | End: 2022-03-25 | Stop reason: HOSPADM

## 2022-03-13 RX ORDER — ACETAMINOPHEN 650 MG/1
650 SUPPOSITORY RECTAL EVERY 6 HOURS PRN
Status: DISCONTINUED | OUTPATIENT
Start: 2022-03-13 | End: 2022-03-25 | Stop reason: HOSPADM

## 2022-03-13 RX ORDER — ONDANSETRON 2 MG/ML
4 INJECTION INTRAMUSCULAR; INTRAVENOUS EVERY 6 HOURS PRN
Status: DISCONTINUED | OUTPATIENT
Start: 2022-03-13 | End: 2022-03-25 | Stop reason: HOSPADM

## 2022-03-13 RX ORDER — ACETAMINOPHEN 325 MG/1
650 TABLET ORAL EVERY 6 HOURS PRN
COMMUNITY

## 2022-03-13 RX ORDER — AMLODIPINE BESYLATE 5 MG/1
5 TABLET ORAL DAILY
Status: DISCONTINUED | OUTPATIENT
Start: 2022-03-13 | End: 2022-03-25 | Stop reason: HOSPADM

## 2022-03-13 RX ORDER — FUROSEMIDE 10 MG/ML
40 INJECTION INTRAMUSCULAR; INTRAVENOUS DAILY
Status: DISCONTINUED | OUTPATIENT
Start: 2022-03-14 | End: 2022-03-15

## 2022-03-13 RX ORDER — SODIUM CHLORIDE 0.9 % (FLUSH) 0.9 %
5-40 SYRINGE (ML) INJECTION EVERY 12 HOURS SCHEDULED
Status: DISCONTINUED | OUTPATIENT
Start: 2022-03-13 | End: 2022-03-25 | Stop reason: HOSPADM

## 2022-03-13 RX ORDER — ATORVASTATIN CALCIUM 20 MG/1
20 TABLET, FILM COATED ORAL DAILY
Status: DISCONTINUED | OUTPATIENT
Start: 2022-03-13 | End: 2022-03-25 | Stop reason: HOSPADM

## 2022-03-13 RX ORDER — SACUBITRIL AND VALSARTAN 24; 26 MG/1; MG/1
1 TABLET, FILM COATED ORAL 2 TIMES DAILY
COMMUNITY

## 2022-03-13 RX ORDER — ACETAMINOPHEN 325 MG/1
650 TABLET ORAL EVERY 6 HOURS PRN
Status: DISCONTINUED | OUTPATIENT
Start: 2022-03-13 | End: 2022-03-25 | Stop reason: HOSPADM

## 2022-03-13 RX ORDER — ISOSORBIDE MONONITRATE 60 MG/1
30 TABLET, EXTENDED RELEASE ORAL DAILY
Status: DISCONTINUED | OUTPATIENT
Start: 2022-03-13 | End: 2022-03-25 | Stop reason: HOSPADM

## 2022-03-13 RX ORDER — LEVOTHYROXINE SODIUM 0.1 MG/1
100 TABLET ORAL DAILY
Status: DISCONTINUED | OUTPATIENT
Start: 2022-03-13 | End: 2022-03-16

## 2022-03-13 RX ORDER — SODIUM CHLORIDE 0.9 % (FLUSH) 0.9 %
5-40 SYRINGE (ML) INJECTION PRN
Status: DISCONTINUED | OUTPATIENT
Start: 2022-03-13 | End: 2022-03-25 | Stop reason: HOSPADM

## 2022-03-13 RX ORDER — NYSTATIN 10B UNIT
POWDER (EA) MISCELLANEOUS 2 TIMES DAILY
COMMUNITY

## 2022-03-13 RX ORDER — VITAMIN B COMPLEX
1000 TABLET ORAL DAILY
Status: DISCONTINUED | OUTPATIENT
Start: 2022-03-13 | End: 2022-03-25 | Stop reason: HOSPADM

## 2022-03-13 RX ORDER — LOPERAMIDE HYDROCHLORIDE 2 MG/1
2 CAPSULE ORAL 4 TIMES DAILY PRN
COMMUNITY

## 2022-03-13 RX ORDER — ONDANSETRON 4 MG/1
4 TABLET, ORALLY DISINTEGRATING ORAL EVERY 8 HOURS PRN
Status: DISCONTINUED | OUTPATIENT
Start: 2022-03-13 | End: 2022-03-25 | Stop reason: HOSPADM

## 2022-03-13 RX ORDER — POLYETHYLENE GLYCOL 3350 17 G/17G
17 POWDER, FOR SOLUTION ORAL DAILY PRN
Status: DISCONTINUED | OUTPATIENT
Start: 2022-03-13 | End: 2022-03-16

## 2022-03-13 RX ORDER — CHOLECALCIFEROL (VITAMIN D3) 125 MCG
1000 CAPSULE ORAL DAILY
Status: DISCONTINUED | OUTPATIENT
Start: 2022-03-13 | End: 2022-03-25 | Stop reason: HOSPADM

## 2022-03-13 RX ORDER — SPIRONOLACTONE 25 MG/1
25 TABLET ORAL DAILY
Status: DISCONTINUED | OUTPATIENT
Start: 2022-03-13 | End: 2022-03-25 | Stop reason: HOSPADM

## 2022-03-13 RX ORDER — FUROSEMIDE 10 MG/ML
20 INJECTION INTRAMUSCULAR; INTRAVENOUS ONCE
Status: COMPLETED | OUTPATIENT
Start: 2022-03-13 | End: 2022-03-13

## 2022-03-13 RX ORDER — LOPERAMIDE HYDROCHLORIDE 2 MG/1
2 CAPSULE ORAL 4 TIMES DAILY PRN
Status: DISCONTINUED | OUTPATIENT
Start: 2022-03-13 | End: 2022-03-25 | Stop reason: HOSPADM

## 2022-03-13 RX ORDER — SODIUM CHLORIDE 9 MG/ML
25 INJECTION, SOLUTION INTRAVENOUS PRN
Status: DISCONTINUED | OUTPATIENT
Start: 2022-03-13 | End: 2022-03-25 | Stop reason: HOSPADM

## 2022-03-13 RX ORDER — ALLOPURINOL 100 MG/1
100 TABLET ORAL 2 TIMES DAILY
Status: DISCONTINUED | OUTPATIENT
Start: 2022-03-13 | End: 2022-03-25 | Stop reason: HOSPADM

## 2022-03-13 RX ORDER — CARVEDILOL 12.5 MG/1
12.5 TABLET ORAL 2 TIMES DAILY WITH MEALS
Status: DISCONTINUED | OUTPATIENT
Start: 2022-03-13 | End: 2022-03-25 | Stop reason: HOSPADM

## 2022-03-13 RX ORDER — LANOLIN ALCOHOL/MO/W.PET/CERES
400 CREAM (GRAM) TOPICAL 2 TIMES DAILY
Status: DISCONTINUED | OUTPATIENT
Start: 2022-03-13 | End: 2022-03-25 | Stop reason: HOSPADM

## 2022-03-13 RX ORDER — ASPIRIN 81 MG/1
81 TABLET ORAL DAILY
Status: DISCONTINUED | OUTPATIENT
Start: 2022-03-13 | End: 2022-03-25 | Stop reason: HOSPADM

## 2022-03-13 RX ADMIN — SACUBITRIL AND VALSARTAN 1 TABLET: 24; 26 TABLET, FILM COATED ORAL at 20:48

## 2022-03-13 RX ADMIN — SODIUM CHLORIDE, PRESERVATIVE FREE 10 ML: 5 INJECTION INTRAVENOUS at 20:54

## 2022-03-13 RX ADMIN — ALLOPURINOL 100 MG: 100 TABLET ORAL at 20:48

## 2022-03-13 RX ADMIN — APIXABAN 5 MG: 5 TABLET, FILM COATED ORAL at 20:48

## 2022-03-13 RX ADMIN — Medication 400 MG: at 20:48

## 2022-03-13 RX ADMIN — CARVEDILOL 12.5 MG: 12.5 TABLET, FILM COATED ORAL at 20:48

## 2022-03-13 RX ADMIN — FUROSEMIDE 20 MG: 10 INJECTION, SOLUTION INTRAMUSCULAR; INTRAVENOUS at 16:07

## 2022-03-13 RX ADMIN — TROSPIUM CHLORIDE 20 MG: 20 TABLET, FILM COATED ORAL at 20:48

## 2022-03-13 ASSESSMENT — PAIN DESCRIPTION - ONSET
ONSET: ON-GOING
ONSET: ON-GOING

## 2022-03-13 ASSESSMENT — PAIN DESCRIPTION - FREQUENCY
FREQUENCY: CONTINUOUS
FREQUENCY: CONTINUOUS

## 2022-03-13 ASSESSMENT — PAIN DESCRIPTION - DESCRIPTORS
DESCRIPTORS: ACHING
DESCRIPTORS: ACHING

## 2022-03-13 ASSESSMENT — PAIN DESCRIPTION - PROGRESSION
CLINICAL_PROGRESSION: NOT CHANGED
CLINICAL_PROGRESSION: NOT CHANGED

## 2022-03-13 ASSESSMENT — ENCOUNTER SYMPTOMS
COUGH: 1
COUGH: 0
ABDOMINAL PAIN: 0
SHORTNESS OF BREATH: 1
ABDOMINAL DISTENTION: 1

## 2022-03-13 ASSESSMENT — PAIN DESCRIPTION - LOCATION
LOCATION: GENERALIZED
LOCATION: GENERALIZED

## 2022-03-13 ASSESSMENT — PAIN DESCRIPTION - ORIENTATION: ORIENTATION: OTHER (COMMENT)

## 2022-03-13 ASSESSMENT — PAIN - FUNCTIONAL ASSESSMENT
PAIN_FUNCTIONAL_ASSESSMENT: PREVENTS OR INTERFERES SOME ACTIVE ACTIVITIES AND ADLS
PAIN_FUNCTIONAL_ASSESSMENT: PREVENTS OR INTERFERES SOME ACTIVE ACTIVITIES AND ADLS

## 2022-03-13 ASSESSMENT — PAIN DESCRIPTION - PAIN TYPE
TYPE: CHRONIC PAIN
TYPE: CHRONIC PAIN

## 2022-03-13 ASSESSMENT — PAIN SCALES - GENERAL
PAINLEVEL_OUTOF10: 2
PAINLEVEL_OUTOF10: 1

## 2022-03-13 NOTE — H&P
[facility] - History & Physical      PCP: Jack Morris    Date of Admission: 3/13/2022    Date of Service: 3/13/2022    Chief Complaint:  Shortness of breath    History Of Present Illness: The patient is a 79 y.o. female who presented to [facility] ER with PMH CAD, CABG, anxiety, HTN, EF around 15-20%, possible early dementia complaining of worsening shortness of breath. Patient is a poor historian and history of present illness is obtained primarily from her daughter and ER documentation. Patient was recently hospitalized in January due to similar concerns, medication adjustments were performed and patient was discharged to SNF. The patient was discharged from [facility] on Tuesday per report she said shortness of air and worsening since Tuesday. Brought to the ER by family by private vehicle today. Patient was seen by cardiology for routine follow-up and was tolerating Entresto and Coreg and was switched to Eliquis 5 twice daily. With recommendations to recheck 2D echo in 3 months and if the EF still reduced to 35% there would be consideration for biventricular ICD. Daughter states that patient has had poor intake, dry cough, short of breath with minimal exertion, and lethargy. She has just been on Macrobid since Tuesday as well she was post to take her last pill for UTI today as well. Endorses poor intake, dry cough, dyspnea at rest, generalized weakness, and fatigue. Denies fever, chills, nausea, vomiting, diarrhea, abdominal pain, and urinary symptoms. Work up in [address] mild cardiomegaly,no obvious pulmonary vascular congestion. Hgb 10.6, HCT 35.2, creatinine 1.8 BUN 46, troponin negative, BNP 12,185, and urinalysis negative. Received Lasix 20mg IV while in ER. Patient is to be admitted to hospitalist service due to acute on chronic congestive heart failure with consultation to cardiology.   Past Medical History:        Diagnosis Date    Arthritis     Bladder infection     CAD (coronary artery disease)     Cardiovascular disease     Chronic kidney disease     Colon polyps     Constipation     Gallbladder disease     GERD (gastroesophageal reflux disease)     Gout     Heart attack ([address]     Heart disease     Hyperlipidemia     Hypertension     Incontinence     Thyroid disease        Past Surgical History:        Procedure Laterality Date    CHOLECYSTECTOMY      CORONARY ARTERY BYPASS GRAFT      heart bypass    HYSTERECTOMY         Home Medications:  Prior to Admission medications    Medication Sig Start Date End Date Taking?  Authorizing Provider   sacubitril-valsartan (ENTRESTO) 24-26 MG per tablet Take 1 tablet by mouth 2 times daily   Yes Historical Provider, MD   loperamide (IMODIUM A-D) 2 MG capsule Take 2 mg by mouth 4 times daily as needed for Diarrhea   Yes Historical Provider, MD   nystatin (MYCOSTATIN) POWD powder Apply topically 2 times daily   Yes Historical Provider, MD   acetaminophen (TYLENOL) 325 MG tablet Take 650 mg by mouth every 6 hours as needed for Pain   Yes Historical Provider, MD   furosemide (LASIX) 20 MG tablet Take 1 tablet by mouth daily  Patient taking differently: Take 20 mg by mouth 2 times daily Take one tablet daily 1/4/22  Yes Joseph Cain MD   isosorbide mononitrate (IMDUR) 30 MG extended release tablet Take 1 tablet by mouth daily 1/3/22 3/13/22 Yes Francis Hidalgo MD   tolterodine (DETROL) 2 MG tablet Take 2 mg by mouth nightly   Yes Historical Provider, MD   aspirin 81 MG EC tablet Take 81 mg by mouth daily   Yes Historical Provider, MD   Cyanocobalamin 1000 MCG LOZG Take 1,000 mcg by mouth daily   Yes Historical Provider, MD   levothyroxine (SYNTHROID) 100 MCG tablet Take 100 mcg by mouth Daily   Yes Historical Provider, MD   magnesium oxide (MAG-OX) 400 MG tablet Take 400 mg by mouth 2 times daily    Yes Historical Provider, MD   vitamin D 25 MCG (1000 UT) CAPS Take 1,000 Units by mouth daily    Yes Historical Provider, MD   carvedilol (COREG) 12.5 MG tablet Take 1 tablet by mouth 2 times daily (with meals) 1/2/22 2/1/22  Francis Hidalgo MD   spironolactone (ALDACTONE) 25 MG tablet Take 1 tablet by mouth daily 1/3/22 2/2/22  Francis Hidalgo MD   allopurinol (ZYLOPRIM) 100 MG tablet Take 100 mg by mouth 2 times daily    Historical Provider, MD   amLODIPine (NORVASC) 5 MG tablet Take 5 mg by mouth daily     Historical Provider, MD   ammonium lactate (LAC-HYDRIN) 12 % lotion Apply topically as needed for Dry Skin Apply topically as needed. Historical Provider, MD   atorvastatin (LIPITOR) 20 MG tablet Take 20 mg by mouth daily    Historical Provider, MD   nitroglycerin (NITRO-BID) 2 % ointment Place 0.5 inches onto the skin as needed     Historical Provider, MD       Allergies:    Patient has no known allergies. Social History:    The patient currently lives home with daughter  Tobacco:   reports that she has never smoked. She has never used smokeless tobacco.  Alcohol:   reports previous alcohol use. Illicit Drugs: denies    Family History:      Problem Relation Age of Onset    Heart Attack Father     Cancer Mother        Review of Systems:   Review of Systems   Constitutional: Positive for activity change, appetite change and fatigue. HENT: Negative. Respiratory: Positive for cough (dry) and shortness of breath. Gastrointestinal: Positive for abdominal distention. Genitourinary: Positive for decreased urine volume. Musculoskeletal: Positive for arthralgias and myalgias. Skin: Positive for pallor. Neurological: Positive for weakness. 14 point review of systems is negative except as specifically addressed above. Physical Examination:  BP (!) 117/96   Pulse 82   Temp 97.7 °F (36.5 °C) (Oral)   Resp 24   Wt 169 lb (76.7 kg)   SpO2 97%   BMI 30.91 kg/m²   Physical Exam  Vitals and nursing note reviewed. Constitutional:       General: She is not in acute distress. Appearance: Normal appearance.  She is not ill-appearing. HENT:      Mouth/Throat:      Mouth: Mucous membranes are dry. Pharynx: Oropharynx is clear. Eyes:      Extraocular Movements: Extraocular movements intact. Conjunctiva/sclera: Conjunctivae normal.      Pupils: Pupils are equal, round, and reactive to light. Cardiovascular:      Rate and Rhythm: Normal rate. Rhythm irregular. Pulses: Normal pulses. Heart sounds: Normal heart sounds. No murmur heard. Pulmonary:      Effort: Pulmonary effort is normal. No respiratory distress. Breath sounds: Rales present. Chest:      Chest wall: No tenderness. Abdominal:      General: Bowel sounds are normal. There is no distension. Palpations: Abdomen is soft. Tenderness: There is no abdominal tenderness. There is no guarding or rebound. Musculoskeletal:         General: No swelling. Normal range of motion. Cervical back: Normal range of motion and neck supple. No rigidity or tenderness. Right lower le+ Pitting Edema present. Left lower le+ Pitting Edema present. Skin:     General: Skin is warm and dry. Capillary Refill: Capillary refill takes less than 2 seconds. Neurological:      General: No focal deficit present. Mental Status: She is alert and oriented to person, place, and time. GCS: GCS eye subscore is 4. GCS verbal subscore is 4. GCS motor subscore is 6. Cranial Nerves: No cranial nerve deficit. Motor: Weakness present. Comments: Solomence   Psychiatric:         Mood and Affect: Mood normal.         Behavior: Behavior normal.         Cognition and Memory: Cognition is impaired.         Diagnostic Data:  CBC:  Recent Labs     22  1533   WBC 6.4   HGB 10.6*   HCT 35.2*        BMP:  Recent Labs     22  1533      K 4.7      CO2 21*   BUN 46*   CREATININE 1.8*   CALCIUM 10.2     Recent Labs     22  1533   AST 17   ALT 11   BILITOT 0.7   ALKPHOS 90     Coag Panel: Recent Labs     03/13/22  1533   INR 1.86*   PROTIME 21.4*     Cardiac Enzymes:   Recent Labs     03/13/22  1533   TROPONINI <0.01     Urinalysis:  Lab Results   Component Value Date    NITRU Negative 03/13/2022    BLOODU Negative 03/13/2022    SPECGRAV 1.012 03/13/2022    GLUCOSEU Negative 03/13/2022       XR CHEST PORTABLE    Result Date: 3/13/2022  EXAMINATION: XR CHEST PORTABLE 3/13/2022 4:57 PM HISTORY: XR CHEST PORTABLE 3/13/2022 3:19 PM HISTORY: Short of breath COMPARISON: December 31, 2021. FINDINGS: The lungs are clear. Cardiac silhouettes mildly enlarged. There is fullness in the right hilum. Wires are present previous median sternotomy. . The osseous structures and surrounding soft tissues demonstrate no acute abnormality. 1. Mild cardiomegaly no obvious pulmonary vascular congestion.  Signed by Dr Yolanda Arana    Assessment/Plan:    Acute on chronic systolic (congestive) heart failure    - Cardiology consultation recommendations appreciated   -CT abdomen/pelvis   -VL Dup Bilateral lower extremities              - ECHO    -Continuous pulse oximetry               - IV Lasix               - Low-sodium diet              - Monitor I's and O's closely              - Daily weights              - Monitor on telemetry   -Daily labs    JENNA (acute kidney injury)               - Monitor I's and O's closely              - Monitor labs closely              - Avoid hypotension              - Avoid nephrotoxic agents    Chronic atrial fibrillation    -Resume home medication: Coreg   - Serial and PRN EKGs  - Monitor on telemetry     DVT prophylactic: Eliquis     Signed:  GIAN Cannon - CNP, 3/13/2022 4:23 PM

## 2022-03-13 NOTE — PROGRESS NOTES
Pharmacy Renal Dose Adjustment      Recent Labs     03/13/22  1533   BUN 46*       Recent Labs     03/13/22  1533   CREATININE 1.8*       Estimated Creatinine Clearance: 23 mL/min (A) (based on SCr of 1.8 mg/dL (H)). Plan: Changed Lovenox to 30 mg SC daily due to patients renal function. Pharmacy will continue to follow and make adjustments as needed.     Electronically signed by Levi Padilla [facility] on 3/13/2022 at 4:25 PM

## 2022-03-13 NOTE — ED PROVIDER NOTES
[facility]  eMERGENCY dEPARTMENT eNCOUnter      Pt Name: Doug Delvalle  MRN: [id number]  [place] 1940  Date of evaluation: 3/13/2022  Provider: Tanvir Ortega MD    CHIEF COMPLAINT       Chief Complaint   Patient presents with    Shortness of Breath     pt was dc'd from [facility] on tuesday, and has had SOA since leaving         HISTORY OF PRESENT ILLNESS   (Location/Symptom, Timing/Onset,Context/Setting, Quality, Duration, Modifying Factors, Severity)  Note limiting factors. Doug Delvalle is a 79 y.o. female who presents to the emergency department with shortness of breath. She was in the hospital around [place] she has known congestive heart failure and systolic failure. The patient was discharged from [facility] on Tuesday per report she said shortness of air and worsening since Tuesday. Brought to the ER by family by private vehicle today. Known low EF around 15-20%. Patient symptoms worse with exertion. Was seen at the end of January and found at cardiology visit to  \"Patient tolerating Entresto and Coreg. Patient was switched to Eliquis 5 mg twice daily. Recommendation from cardiology to recheck 2D echo in 3 months and if the EF still reduced to 35% there would be consideration for biventricular ICD. \"    Patient went home with daughter on Tuesday she states she is just been short of breath fatigue lethargic. The patient had A. fib since leaving the hospital she is on Eliquis. She takes Lasix 40 mg twice daily patient seems to have heavy breathing she states it is labored as well. She seems sleepy she has dyspnea on exertion she cannot do her ADLs the only reason she is eating is because her daughter cooks all her food from her she is urinating okay. Per daughter patient has not really drank a whole lot of fluids. She has just been on Macrobid since Tuesday as well she was post to take her last pill for UTI today as well.     The history is provided by the patient, a relative and medical records. NursingNotes were reviewed. REVIEW OF SYSTEMS    (2-9 systems for level 4, 10 or more for level 5)     Review of Systems   Constitutional: Positive for fatigue. Negative for fever. Respiratory: Positive for shortness of breath. Negative for cough. Cardiovascular: Negative for chest pain. Gastrointestinal: Negative for abdominal pain. Genitourinary: Negative for dysuria. Neurological: Negative for seizures and syncope. A complete review of systems was performed and is negative except as noted above in the HPI. PAST MEDICAL HISTORY     Past Medical History:   Diagnosis Date    Arthritis     Bladder infection     CAD (coronary artery disease)     Cardiovascular disease     Chronic kidney disease     Colon polyps     Constipation     Gallbladder disease     GERD (gastroesophageal reflux disease)     Gout     Heart attack [facility] [address]     Heart disease     Hyperlipidemia     Hypertension     Incontinence     Thyroid disease          SURGICAL HISTORY       Past Surgical History:   Procedure Laterality Date    CHOLECYSTECTOMY      CORONARY ARTERY BYPASS GRAFT      heart bypass    HYSTERECTOMY           CURRENT MEDICATIONS       Previous Medications    ACETAMINOPHEN (TYLENOL) 325 MG TABLET    Take 650 mg by mouth every 6 hours as needed for Pain    ALLOPURINOL (ZYLOPRIM) 100 MG TABLET    Take 100 mg by mouth 2 times daily    AMLODIPINE (NORVASC) 5 MG TABLET    Take 5 mg by mouth daily     AMMONIUM LACTATE (LAC-HYDRIN) 12 % LOTION    Apply topically as needed for Dry Skin Apply topically as needed.     ASPIRIN 81 MG EC TABLET    Take 81 mg by mouth daily    ATORVASTATIN (LIPITOR) 20 MG TABLET    Take 20 mg by mouth daily    CARVEDILOL (COREG) 12.5 MG TABLET    Take 1 tablet by mouth 2 times daily (with meals)    CYANOCOBALAMIN 1000 MCG LOZG    Take 1,000 mcg by mouth daily    FUROSEMIDE (LASIX) 20 MG TABLET    Take 1 tablet by mouth daily    ISOSORBIDE MONONITRATE (IMDUR) 30 MG EXTENDED RELEASE TABLET    Take 1 tablet by mouth daily    LEVOTHYROXINE (SYNTHROID) 100 MCG TABLET    Take 100 mcg by mouth Daily    LOPERAMIDE (IMODIUM A-D) 2 MG CAPSULE    Take 2 mg by mouth 4 times daily as needed for Diarrhea    MAGNESIUM OXIDE (MAG-OX) 400 MG TABLET    Take 400 mg by mouth 2 times daily     NITROGLYCERIN (NITRO-BID) 2 % OINTMENT    Place 0.5 inches onto the skin as needed     NYSTATIN (MYCOSTATIN) POWD POWDER    Apply topically 2 times daily    SACUBITRIL-VALSARTAN (ENTRESTO) 24-26 MG PER TABLET    Take 1 tablet by mouth 2 times daily    SPIRONOLACTONE (ALDACTONE) 25 MG TABLET    Take 1 tablet by mouth daily    TOLTERODINE (DETROL) 2 MG TABLET    Take 2 mg by mouth nightly    VITAMIN D 25 MCG (1000 UT) CAPS    Take 1,000 Units by mouth daily        ALLERGIES     Patient has no known allergies. FAMILY HISTORY       Family History   Problem Relation Age of Onset    Heart Attack Father     Cancer Mother           SOCIAL HISTORY       Social History     Socioeconomic History    Marital status:      Spouse name: None    Number of children: None    Years of education: None    Highest education level: None   Occupational History    None   Tobacco Use    Smoking status: Never Smoker    Smokeless tobacco: Never Used   Vaping Use    Vaping Use: Never used   Substance and Sexual Activity    Alcohol use: Not Currently    Drug use: Not Currently    Sexual activity: Not Currently   Other Topics Concern    None   Social History Narrative    None     Social Determinants of Health     Financial Resource Strain:     Difficulty of Paying Living Expenses: Not on file   Food Insecurity:     Worried About Running Out of Food in the Last Year: Not on file    Анна of Food in the Last Year: Not on file   Transportation Needs:     Lack of Transportation (Medical): Not on file    Lack of Transportation (Non-Medical):  Not on file   Physical Activity:     Days of Exercise per Week: Not on file    Minutes of Exercise per Session: Not on file   Stress:     Feeling of Stress : Not on file   Social Connections:     Frequency of Communication with Friends and Family: Not on file    Frequency of Social Gatherings with Friends and Family: Not on file    Attends [employer] Services: Not on file    Active Member of [address] [place] [address] [employer] or Organizations: Not on file    Attends Club or Organization Meetings: Not on file    Marital Status: Not on file   Intimate Partner Violence:     Fear of Current or Ex-Partner: Not on file    Emotionally Abused: Not on file    Physically Abused: Not on file    Sexually Abused: Not on file   Housing Stability:     Unable to Pay for Housing in the Last Year: Not on file    Number of [place] in the Last Year: Not on file    Unstable Housing in the Last Year: Not on file       SCREENINGS    [place] Coma Scale  Eye Opening: Spontaneous  Best Verbal Response: Oriented  Best Motor Response: Obeys commands  [place] Coma Scale Score: 15        PHYSICAL EXAM    (up to 7 for level 4, 8 or more for level 5)     ED Triage Vitals   BP Temp Temp src Pulse Resp SpO2 Height Weight   -- -- -- -- -- -- -- --       Physical Exam  Vitals and nursing note reviewed. Constitutional:       Appearance: She is ill-appearing. Comments: Patient is awake alert but she is tired fatigued and lethargic appearing laying in bed   HENT:      Head: Normocephalic and atraumatic. Eyes:      Extraocular Movements: Extraocular movements intact. Pupils: Pupils are equal, round, and reactive to light. Cardiovascular:      Rate and Rhythm: Normal rate. Rhythm irregular. Comments: Irregularly irregular heart rate 61  Pulmonary:      Effort: Respiratory distress present. Breath sounds: Rales present. Comments: Increased wob     Decreased breath sounds at bases   Abdominal:      General: Abdomen is flat. Palpations: Abdomen is soft.    Musculoskeletal: Cervical back: Normal range of motion and neck supple. Right lower leg: Edema present. Left lower leg: Edema present. Skin:     General: Skin is warm and dry. Capillary Refill: Capillary refill takes less than 2 seconds. Neurological:      General: No focal deficit present. Mental Status: She is alert and oriented to person, place, and time. Psychiatric:      Comments: [place] calm          DIAGNOSTIC RESULTS     EKG: All EKG's are interpreted by the Emergency Department Physician who either signs or Co-signs this chart in the absence of a cardiologist.    Atrial fibrillation rate 61 there is a left bundle branch block there is artifact the QTC is 509 ms QRS is 192 ms. Compared to old EKG there is a left bundle and A. fib at that time as well from January. RADIOLOGY:   Non-plain film images such as CT, Ultrasound and MRI are read by the radiologist. [place] images are visualized and preliminarily interpreted by the emergency physician with the below findings:      Interpretation per the Radiologist below, if available at the time of this note:    XR CHEST PORTABLE   Final Result   1. Mild cardiomegaly no obvious pulmonary vascular congestion.    Signed by Dr Bessy Rodriguez            ED BEDSIDE ULTRASOUND:   Performed by ED Physician - none    LABS:  Labs Reviewed   CBC WITH AUTO DIFFERENTIAL - Abnormal; Notable for the following components:       Result Value    RBC 3.89 (*)     Hemoglobin 10.6 (*)     Hematocrit 35.2 (*)     MCHC 30.1 (*)     RDW 16.6 (*)     Neutrophils % 72.4 (*)     Lymphocytes % 14.0 (*)     Monocytes % 11.2 (*)     Lymphocytes Absolute 0.9 (*)     All other components within normal limits   COMPREHENSIVE METABOLIC PANEL - Abnormal; Notable for the following components:    CO2 21 (*)     Glucose 125 (*)     BUN 46 (*)     CREATININE 1.8 (*)     GFR Non- 27 (*)     GFR  33 (*)     All other components within normal limits PROTIME-INR - Abnormal; Notable for the following components:    Protime 21.4 (*)     INR 1.86 (*)     All other components within normal limits   BRAIN NATRIURETIC PEPTIDE - Abnormal; Notable for the following components:    Pro-BNP 12,185 (*)     All other components within normal limits   TSH WITH REFLEX TO FT4 - Abnormal; Notable for the following components:    TSH Reflex FT4 17.94 (*)     All other components within normal limits   COVID-19, RAPID   URINALYSIS WITH REFLEX TO CULTURE   TROPONIN   T4, FREE       All other labs were within normal range or not returned as of this dictation. EMERGENCY DEPARTMENT COURSE and DIFFERENTIALDIAGNOSIS/MDM:   Vitals:    Vitals:    03/13/22 1515 03/13/22 1517   BP: (!) 117/96    Pulse: 82    Resp: 24    Temp: 97.7 °F (36.5 °C)    TempSrc: Oral    SpO2:  97%   Weight: 169 lb (76.7 kg)        MDM  Number of Diagnoses or Management Options  Acute on chronic systolic congestive heart failure ([facility] [address]): new and does not require workup  JENNA (acute kidney injury) ([facility] [address]): new and does not require workup  Chronic a-fib ([facility] [address]): new and does not require workup  Chronic anticoagulation  Dyspnea and respiratory abnormalities: new and does not require workup  Other fatigue: new and does not require workup  Diagnosis management comments: Patient with shortness of breath likely volume overload given her profound low EF she is in A. fib she is at home. She likely needs diuresis will evaluate her labs and chest x-ray. She is not doing well at home. She is DNR and review of the chart and discussion with her daughter. 4:11 PM CDT  Patient will be started on a little bit of diuresis. Give her 20 of IV Lasix she had only put out about 55 cc of urine she seems volume overloaded by her BNP and clinically with her shortness of breath. Likely multifactorial along with her issues with her A. fib. The patient's blood pressure and rate have been okay.   The patient will be admitted to the hospitalist service for diuresis she likely needs another echo they are planning on getting one this month to see if she needed an ICD. The patient is on anticoagulation. She is DNR. The patient will be admitted as above. We will see how her kidneys respond and if she needs fluid and more diuresis or other. Her baseline creatinine back in December was 1. Amount and/or Complexity of Data Reviewed  Clinical lab tests: ordered and reviewed  Tests in the radiology section of CPT®: ordered and reviewed          CONSULTS:  None    PROCEDURES:  Unless otherwise notedbelow, none     Procedures    FINAL IMPRESSION     1. Acute on chronic systolic congestive heart failure ([facility] [address]    2. JENNA (acute kidney injury) ([facility] [address]    3. Other fatigue    4. Dyspnea and respiratory abnormalities    5. Chronic a-fib ([facility] [address]    6. Chronic anticoagulation          DISPOSITION/PLAN   DISPOSITION        PATIENT REFERRED TO:  No follow-up provider specified.     DISCHARGE MEDICATIONS:  New Prescriptions    No medications on file          (Please note that portions of this note were completed with a voice recognition program.  Efforts were made to edit the dictations butoccasionally words are mis-transcribed.)    Jessica Johnson MD (electronically signed)  Venancio Rose MD  03/13/22 Brandt Lopes MD  03/13/22 [id number]

## 2022-03-14 ENCOUNTER — TELEPHONE (OUTPATIENT)
Dept: CARDIOLOGY CLINIC | Age: 82
End: 2022-03-14

## 2022-03-14 LAB
ANION GAP SERPL CALCULATED.3IONS-SCNC: 13 MMOL/L (ref 7–19)
BUN BLDV-MCNC: 46 MG/DL (ref 8–23)
CALCIUM SERPL-MCNC: 10.5 MG/DL (ref 8.8–10.2)
CHLORIDE BLD-SCNC: 101 MMOL/L (ref 98–111)
CO2: 23 MMOL/L (ref 22–29)
CREAT SERPL-MCNC: 1.7 MG/DL (ref 0.5–0.9)
EKG P AXIS: NORMAL DEGREES
EKG P-R INTERVAL: NORMAL MS
EKG Q-T INTERVAL: 476 MS
EKG QRS DURATION: 178 MS
EKG QTC CALCULATION (BAZETT): 475 MS
EKG T AXIS: 135 DEGREES
GFR AFRICAN AMERICAN: 35
GFR NON-AFRICAN AMERICAN: 29
GLUCOSE BLD-MCNC: 127 MG/DL (ref 74–109)
HCT VFR BLD CALC: 32.5 % (ref 37–47)
HEMOGLOBIN: 10.1 G/DL (ref 12–16)
LV EF: 18 %
LVEF MODALITY: NORMAL
MAGNESIUM: 2.4 MG/DL (ref 1.6–2.4)
MCH RBC QN AUTO: 27.5 PG (ref 27–31)
MCHC RBC AUTO-ENTMCNC: 31.1 G/DL (ref 33–37)
MCV RBC AUTO: 88.6 FL (ref 81–99)
PDW BLD-RTO: 16.5 % (ref 11.5–14.5)
PLATELET # BLD: 204 K/UL (ref 130–400)
PMV BLD AUTO: 10.8 FL (ref 9.4–12.3)
POTASSIUM SERPL-SCNC: 4.4 MMOL/L (ref 3.5–5)
RBC # BLD: 3.67 M/UL (ref 4.2–5.4)
SODIUM BLD-SCNC: 137 MMOL/L (ref 136–145)
WBC # BLD: 5.4 K/UL (ref 4.8–10.8)

## 2022-03-14 PROCEDURE — 80048 BASIC METABOLIC PNL TOTAL CA: CPT

## 2022-03-14 PROCEDURE — 99222 1ST HOSP IP/OBS MODERATE 55: CPT | Performed by: INTERNAL MEDICINE

## 2022-03-14 PROCEDURE — 93970 EXTREMITY STUDY: CPT

## 2022-03-14 PROCEDURE — 6360000004 HC RX CONTRAST MEDICATION: Performed by: INTERNAL MEDICINE

## 2022-03-14 PROCEDURE — 36415 COLL VENOUS BLD VENIPUNCTURE: CPT

## 2022-03-14 PROCEDURE — 6370000000 HC RX 637 (ALT 250 FOR IP): Performed by: INTERNAL MEDICINE

## 2022-03-14 PROCEDURE — 85027 COMPLETE CBC AUTOMATED: CPT

## 2022-03-14 PROCEDURE — 83735 ASSAY OF MAGNESIUM: CPT

## 2022-03-14 PROCEDURE — 2140000000 HC CCU INTERMEDIATE R&B

## 2022-03-14 PROCEDURE — 2580000003 HC RX 258

## 2022-03-14 PROCEDURE — 6370000000 HC RX 637 (ALT 250 FOR IP)

## 2022-03-14 PROCEDURE — 6360000002 HC RX W HCPCS

## 2022-03-14 PROCEDURE — C8929 TTE W OR WO FOL WCON,DOPPLER: HCPCS

## 2022-03-14 RX ADMIN — LEVOTHYROXINE SODIUM 100 MCG: 100 TABLET ORAL at 05:22

## 2022-03-14 RX ADMIN — Medication 400 MG: at 20:51

## 2022-03-14 RX ADMIN — SACUBITRIL AND VALSARTAN 1 TABLET: 24; 26 TABLET, FILM COATED ORAL at 09:12

## 2022-03-14 RX ADMIN — ALLOPURINOL 100 MG: 100 TABLET ORAL at 09:12

## 2022-03-14 RX ADMIN — Medication 1000 UNITS: at 09:12

## 2022-03-14 RX ADMIN — CYANOCOBALAMIN TAB 500 MCG 1000 MCG: 500 TAB at 09:12

## 2022-03-14 RX ADMIN — TROSPIUM CHLORIDE 20 MG: 20 TABLET, FILM COATED ORAL at 20:51

## 2022-03-14 RX ADMIN — SACUBITRIL AND VALSARTAN 1 TABLET: 24; 26 TABLET, FILM COATED ORAL at 20:51

## 2022-03-14 RX ADMIN — CARVEDILOL 12.5 MG: 12.5 TABLET, FILM COATED ORAL at 09:12

## 2022-03-14 RX ADMIN — APIXABAN 2.5 MG: 2.5 TABLET, FILM COATED ORAL at 20:51

## 2022-03-14 RX ADMIN — SODIUM CHLORIDE, PRESERVATIVE FREE 10 ML: 5 INJECTION INTRAVENOUS at 20:52

## 2022-03-14 RX ADMIN — SODIUM CHLORIDE, PRESERVATIVE FREE 10 ML: 5 INJECTION INTRAVENOUS at 09:13

## 2022-03-14 RX ADMIN — Medication 400 MG: at 09:12

## 2022-03-14 RX ADMIN — APIXABAN 5 MG: 5 TABLET, FILM COATED ORAL at 09:11

## 2022-03-14 RX ADMIN — ATORVASTATIN CALCIUM 20 MG: 20 TABLET, FILM COATED ORAL at 09:11

## 2022-03-14 RX ADMIN — ACETAMINOPHEN 650 MG: 325 TABLET ORAL at 05:29

## 2022-03-14 RX ADMIN — ISOSORBIDE MONONITRATE 30 MG: 60 TABLET, EXTENDED RELEASE ORAL at 09:12

## 2022-03-14 RX ADMIN — SPIRONOLACTONE 25 MG: 25 TABLET ORAL at 09:12

## 2022-03-14 RX ADMIN — AMLODIPINE BESYLATE 5 MG: 5 TABLET ORAL at 09:12

## 2022-03-14 RX ADMIN — ASPIRIN 81 MG: 81 TABLET, COATED ORAL at 09:12

## 2022-03-14 RX ADMIN — FUROSEMIDE 40 MG: 10 INJECTION, SOLUTION INTRAMUSCULAR; INTRAVENOUS at 09:12

## 2022-03-14 RX ADMIN — PERFLUTREN 1.5 ML: 6.52 INJECTION, SUSPENSION INTRAVENOUS at 15:47

## 2022-03-14 RX ADMIN — CARVEDILOL 12.5 MG: 12.5 TABLET, FILM COATED ORAL at 17:39

## 2022-03-14 RX ADMIN — ALLOPURINOL 100 MG: 100 TABLET ORAL at 20:51

## 2022-03-14 ASSESSMENT — PAIN SCALES - GENERAL
PAINLEVEL_OUTOF10: 5
PAINLEVEL_OUTOF10: 0

## 2022-03-14 ASSESSMENT — ENCOUNTER SYMPTOMS
SHORTNESS OF BREATH: 1
GASTROINTESTINAL NEGATIVE: 1

## 2022-03-14 NOTE — PROGRESS NOTES
Vascular Lab Prelim  Duplex venous scan of B/L LE shows no evidence for dvt or svt noted at this time. +Reflux right SFV. Final report pending.

## 2022-03-14 NOTE — PROGRESS NOTES
Daily Progress Note    Date:3/14/2022  Patient: Sam Green  : 1940  NUS:[id number]  CODE:DNR No additional code details  PCP:Delonte Curran    Admit Date: 3/13/2022  3:10 PM   LOS: 1 day       Subjective: Patient feels like she is doing a bit better today. She denies any new complaints at this time, and says she is breathing a bit more easily. No overnight events.     Review of Systems  Negative unless noted above    Objective:      Vital signs in last 24 hours:  Patient Vitals for the past 24 hrs:   BP Temp Temp src Pulse Resp SpO2 Height Weight   22 1126 123/67 96 °F (35.6 °C) Temporal 81 14 95 % -- --   22 0915 -- -- -- 80 -- -- -- --   22 0535 117/65 97.2 °F (36.2 °C) Temporal 74 24 93 % -- 170 lb 6.4 oz (77.3 kg)   22 0004 (!) 103/51 96.8 °F (36 °C) Temporal 78 20 92 % -- --   224 -- -- -- -- -- -- 5' 3\" (1.6 m) 169 lb (76.7 kg)   226 (!) 115/56 96.8 °F (36 °C) Temporal 62 20 93 % -- --   22 1950 -- -- -- 77 -- -- -- --   22 1739 119/70 97.7 °F (36.5 °C) Oral 77 22 94 % -- --   22 1517 -- -- -- -- -- 97 % -- --   22 1515 (!) 117/96 97.7 °F (36.5 °C) Oral 82 24 -- -- 169 lb (76.7 kg)         Lab Review   Recent Results (from the past 24 hour(s))   EKG 12 Lead    Collection Time: 22  3:17 PM   Result Value Ref Range    P-R Interval  ms    QRS Duration 178 ms    Q-T Interval 476 ms    QTc Calculation (Bazett) 475 ms    P Axis  degrees    T Axis 135 degrees   COVID-19, Rapid    Collection Time: 22  3:25 PM    Specimen: Nasopharyngeal Swab   Result Value Ref Range    SARS-CoV-2, NAAT Not Detected Not Detected   CBC with Auto Differential    Collection Time: 22  3:33 PM   Result Value Ref Range    WBC 6.4 4.8 - 10.8 K/uL    RBC 3.89 (L) 4.20 - 5.40 M/uL    Hemoglobin 10.6 (L) 12.0 - 16.0 g/dL    Hematocrit 35.2 (L) 37.0 - 47.0 %    MCV 90.5 81.0 - 99.0 fL    MCH 27.2 27.0 - 31.0 pg    MCHC 30.1 (L) 33.0 - 37.0 g/dL    RDW 16.6 (H) 11.5 - 14.5 %    Platelets 268 176 - 039 K/uL    MPV 10.5 9.4 - 12.3 fL    Neutrophils % 72.4 (H) 50.0 - 65.0 %    Lymphocytes % 14.0 (L) 20.0 - 40.0 %    Monocytes % 11.2 (H) 0.0 - 10.0 %    Eosinophils % 1.6 0.0 - 5.0 %    Basophils % 0.5 0.0 - 1.0 %    Neutrophils Absolute 4.7 1.5 - 7.5 K/uL    Immature Granulocytes # 0.0 K/uL    Lymphocytes Absolute 0.9 (L) 1.1 - 4.5 K/uL    Monocytes Absolute 0.70 0.00 - 0.90 K/uL    Eosinophils Absolute 0.10 0.00 - 0.60 K/uL    Basophils Absolute 0.00 0.00 - 0.20 K/uL   Comprehensive Metabolic Panel    Collection Time: 03/13/22  3:33 PM   Result Value Ref Range    Sodium 136 136 - 145 mmol/L    Potassium 4.7 3.5 - 5.0 mmol/L    Chloride 101 98 - 111 mmol/L    CO2 21 (L) 22 - 29 mmol/L    Anion Gap 14 7 - 19 mmol/L    Glucose 125 (H) 74 - 109 mg/dL    BUN 46 (H) 8 - 23 mg/dL    CREATININE 1.8 (H) 0.5 - 0.9 mg/dL    GFR Non-African American 27 (A) >60    GFR  33 (L) >59    Calcium 10.2 8.8 - 10.2 mg/dL    Total Protein 7.5 6.6 - 8.7 g/dL    Albumin 4.1 3.5 - 5.2 g/dL    Total Bilirubin 0.7 0.2 - 1.2 mg/dL    Alkaline Phosphatase 90 35 - 104 U/L    ALT 11 5 - 33 U/L    AST 17 5 - 32 U/L   Protime-INR    Collection Time: 03/13/22  3:33 PM   Result Value Ref Range    Protime 21.4 (H) 12.0 - 14.6 sec    INR 1.86 (H) 0.88 - 1.18   Troponin    Collection Time: 03/13/22  3:33 PM   Result Value Ref Range    Troponin <0.01 0.00 - 0.03 ng/mL   Brain Natriuretic Peptide    Collection Time: 03/13/22  3:33 PM   Result Value Ref Range    Pro-BNP 12,185 (H) 0 - 1,800 pg/mL   TSH with Reflex to FT4    Collection Time: 03/13/22  3:33 PM   Result Value Ref Range    TSH Reflex FT4 17.94 (H) 0.35 - 5.50 uIU/mL   T4, Free    Collection Time: 03/13/22  3:33 PM   Result Value Ref Range    T4 Free 1.39 0.93 - 1.70 ng/dL   Iron and TIBC    Collection Time: 03/13/22  3:33 PM   Result Value Ref Range    Iron 21 (L) 37 - 145 ug/dL    TIBC 225 (L) 250 - 400 ug/dL    Iron Saturation 9 (L) 14 - 50 %   Urinalysis with Reflex to Culture    Collection Time: 03/13/22  3:38 PM    Specimen: Urine   Result Value Ref Range    Color, UA YELLOW Straw/Yellow    Clarity, UA Clear Clear    Glucose, Ur Negative Negative mg/dL    Bilirubin Urine Negative Negative    Ketones, Urine Negative Negative mg/dL    Specific Gravity, UA 1.012 1.005 - 1.030    Blood, Urine Negative Negative    pH, UA 6.0 5.0 - 8.0    Protein, UA Negative Negative mg/dL    Urobilinogen, Urine 1.0 <2.0 E.U./dL    Nitrite, Urine Negative Negative    Leukocyte Esterase, Urine Negative Negative   Basic Metabolic Panel    Collection Time: 03/13/22  9:16 PM   Result Value Ref Range    Sodium 136 136 - 145 mmol/L    Potassium 5.0 3.5 - 5.0 mmol/L    Chloride 102 98 - 111 mmol/L    CO2 19 (L) 22 - 29 mmol/L    Anion Gap 15 7 - 19 mmol/L    Glucose 97 74 - 109 mg/dL    BUN 47 (H) 8 - 23 mg/dL    CREATININE 1.7 (H) 0.5 - 0.9 mg/dL    GFR Non-African American 29 (A) >60    GFR  35 (L) >59    Calcium 9.9 8.8 - 10.2 mg/dL   Magnesium    Collection Time: 03/13/22  9:16 PM   Result Value Ref Range    Magnesium 2.6 (H) 1.6 - 2.4 mg/dL   Lipid panel - fasting    Collection Time: 03/13/22  9:16 PM   Result Value Ref Range    Cholesterol, Total 111 (L) 160 - 199 mg/dL    Triglycerides 40 0 - 149 mg/dL    HDL 53 (L) 65 - 121 mg/dL    LDL Calculated 50 <100 mg/dL   CBC    Collection Time: 03/13/22  9:16 PM   Result Value Ref Range    WBC 6.0 4.8 - 10.8 K/uL    RBC 3.53 (L) 4.20 - 5.40 M/uL    Hemoglobin 9.6 (L) 12.0 - 16.0 g/dL    Hematocrit 32.0 (L) 37.0 - 47.0 %    MCV 90.7 81.0 - 99.0 fL    MCH 27.2 27.0 - 31.0 pg    MCHC 30.0 (L) 33.0 - 37.0 g/dL    RDW 16.4 (H) 11.5 - 14.5 %    Platelets 353 116 - 513 K/uL    MPV 11.1 9.4 - 12.3 fL       I/O last 3 completed shifts:   In: 36 [P.O.:280; I.V.:3]  Out: 850 [Urine:850]  I/O this shift:  In: 120 [P.O.:120]  Out: -       Current Facility-Administered Medications:     sodium chloride flush 0.9 % injection 5-40 mL, 5-40 mL, IntraVENous, 2 times per day, Marcelline Halsted, APRN - CNP, 10 mL at 03/14/22 0913    sodium chloride flush 0.9 % injection 5-40 mL, 5-40 mL, IntraVENous, PRN, Marcelline Halsted, APRN - CNP    0.9 % sodium chloride infusion, 25 mL, IntraVENous, PRN, Marcelline Halsted, APRN - CNP    ondansetron (ZOFRAN-ODT) disintegrating tablet 4 mg, 4 mg, Oral, Q8H PRN **OR** ondansetron (ZOFRAN) injection 4 mg, 4 mg, IntraVENous, Q6H PRN, Marcelline Halsted, APRN - CNP    polyethylene glycol (GLYCOLAX) packet 17 g, 17 g, Oral, Daily PRN, Marcelline Halsted, APRN - CNP    acetaminophen (TYLENOL) tablet 650 mg, 650 mg, Oral, Q6H PRN, 650 mg at 03/14/22 0529 **OR** acetaminophen (TYLENOL) suppository 650 mg, 650 mg, Rectal, Q6H PRN, Marcelline Halsted, APRN - CNP    allopurinol (ZYLOPRIM) tablet 100 mg, 100 mg, Oral, BID, Marcelline Halsted, APRN - CNP, 100 mg at 03/14/22 0912    amLODIPine (NORVASC) tablet 5 mg, 5 mg, Oral, Daily, Marcelline Halsted, APRN - CNP, 5 mg at 03/14/22 0912    aspirin EC tablet 81 mg, 81 mg, Oral, Daily, Marcelline Halsted, APRN - CNP, 81 mg at 03/14/22 0912    atorvastatin (LIPITOR) tablet 20 mg, 20 mg, Oral, Daily, Marcelline Halsted, APRN - CNP, 20 mg at 03/14/22 0911    carvedilol (COREG) tablet 12.5 mg, 12.5 mg, Oral, BID WC, Marcelline Halsted, APRN - CNP, 12.5 mg at 03/14/22 0912    vitamin B-12 (CYANOCOBALAMIN) tablet 1,000 mcg, 1,000 mcg, Oral, Daily, Marcelline Halsted, APRN - CNP, 1,000 mcg at 03/14/22 0912    furosemide (LASIX) injection 40 mg, 40 mg, IntraVENous, Daily, Marcelline Halsted, APRN - CNP, 40 mg at 03/14/22 0912    Vitamin D (CHOLECALCIFEROL) tablet 1,000 Units, 1,000 Units, Oral, Daily, Marcelline Halsted, APRN - CNP, 1,000 Units at 03/14/22 0912    trospium (SANCTURA) tablet 20 mg, 20 mg, Oral, Nightly, Marcelline Halsted, APRN - CNP, 20 mg at 03/13/22 2048    loperamide (IMODIUM) capsule 2 mg, 2 mg, Oral, 4x Daily PRN, Marcelline Halsted, APRN - CNP    magnesium oxide (MAG-OX) tablet 400 mg, 400 mg, Oral, BID, Shelly Lorena, APRN - CNP, 400 mg at 03/14/22 0912    isosorbide mononitrate (IMDUR) extended release tablet 30 mg, 30 mg, Oral, Daily, Shelly Gully, APRN - CNP, 30 mg at 03/14/22 0912    spironolactone (ALDACTONE) tablet 25 mg, 25 mg, Oral, Daily, Shelly Lorena, APRN - CNP, 25 mg at 03/14/22 0912    levothyroxine (SYNTHROID) tablet 100 mcg, 100 mcg, Oral, Daily, Shelly Lorena, APRN - CNP, 100 mcg at 03/14/22 0522    apixaban (ELIQUIS) tablet 5 mg, 5 mg, Oral, BID, Shelly Gully, APRN - CNP, 5 mg at 03/14/22 0911    sacubitril-valsartan (ENTRESTO) 24-26 MG per tablet 1 tablet, 1 tablet, Oral, BID, Shelly Gully, APRN - CNP, 1 tablet at 03/14/22 8548      Physical Exam    General - NAD  HEENT - AT NC, EOMI  Neck - No JVD  CVS - S1/S2 present, irregular rhythm, systolic murmur  Lungs - CTA B/L  Abdomen - soft, NT, ND, BS+  Ext - bilateral LE non-pitting edema  Skin - dry skin changes, venous stasis dermatitis    Assessment and Plan    1) acute exacerbation of severe systolic CHF  - continue IV lasix  - TTE pending  - cardiology consulted, recs pending  - strict I/O and daily weights  - monitor on telemetry    2) JENNA  - mildly improved  - will continue entresto and lasix for now, differ to cardiology to adjust/discontinue  - monitor with daily labs    3) A-fib  - controlled  - coreg, eliquis    4) HTN  - home meds    5) hypothyroidism  - synthroid    6) HLD  - lipitor    7) CAD  - home meds    8) GI/DVT ppx  - none/eliquis          Barbara Matamoros MD 3/14/2022 11:38 AM

## 2022-03-14 NOTE — TELEPHONE ENCOUNTER
Reba Sanchez with Dr Darby Paiz office called requesting call back in regards to medication. Please return her call anytime @ [phone] ext [phone]. Thank you.

## 2022-03-14 NOTE — PROGRESS NOTES
Comprehensive Nutrition Assessment    Type and Reason for Visit:  Initial,Positive Nutrition Screen    Nutrition Recommendations/Plan: Pt is at risk for nutritional compromise. CHF education recently completed. Will continue to monitor nutrition status while inpatient. Nutrition Assessment:  Following pt for positive nutrition screen r/t poor po intake. Pt reports good appetite. PO documentation indicates >75%. Wt gain noted. Pt is at risk for nutrition compromise AEB fluid accumulation. Will continue to monitor nutrition status while inpatient. Malnutrition Assessment:  Malnutrition Status: At risk for malnutrition (Comment)    Context:  Chronic Illness     Findings of the 6 clinical characteristics of malnutrition:  Energy Intake:  No significant decrease in energy intake  Weight Loss:  No significant weight loss     Body Fat Loss:  No significant body fat loss     Muscle Mass Loss:  No significant muscle mass loss    Fluid Accumulation:  1 - Mild Extremities,Generalized   Strength:  Not Performed    Estimated Daily Nutrient Needs:  Energy (kcal):  2299-5601 kcal/d (20-25 kcal/kg); Weight Used for Energy Requirements:  Current     Protein (g):   g/d (1.3-2 g/kg IBW); Weight Used for Protein Requirements:  Ideal        Fluid (ml/day):  5858-0889 ml/d; Method Used for Fluid Requirements:  1 ml/kcal      Nutrition Related Findings:  Generalized +1 edema, BUE +1 edema, +2 BLE edema      Current Nutrition Therapies:    ADULT DIET; Regular; Low Sodium (2 gm)    Anthropometric Measures:  · Height: 5' 3\" (160 cm)  · Current Body Weight: 170 lb (77.1 kg)   · Admission Body Weight: 169 lb (76.7 kg)    · Usual Body Weight: 155 lb (70.3 kg) (1/25/2022)     · Ideal Body Weight: 115 lbs; % Ideal Body Weight 147.8 %   · BMI: 30.1  · BMI Categories: Obese Class 1 (BMI 30.0-34. 9)       Nutrition Diagnosis:   · Altered nutrition-related lab values related to cardiac dysfunction,renal dysfunction as evidenced by localized or generalized fluid accumulation,lab values    Nutrition Interventions:   Food and/or Nutrient Delivery:  Continue Current Diet  Nutrition Education/Counseling:  Education completed   Coordination of Nutrition Care:  Continue to monitor while inpatient    Goals:  Po intake >75%, reduced/improved edema       Nutrition Monitoring and Evaluation:   Food/Nutrient Intake Outcomes:  Food and Nutrient Intake  Physical Signs/Symptoms Outcomes:  Biochemical Data,Fluid Status or Edema,Hemodynamic Status,Nutrition Focused Physical Findings,Weight      Electronically signed by Griselda Estrada on 3/14/22 at 1:37 PM CDT    Contact: [phone]

## 2022-03-14 NOTE — TELEPHONE ENCOUNTER
Returned call to Dr. Debbi Arzate office, they wanted us to be aware that patient can't afford her eliquis, they also advised it's not available for PA either. Upon review of chart noticed patient was currently inpatient and Dr. Alsison Garrett was on her case. Reached out to him to let him know the eliquis situation. He advised he would discuss with her and more than likely change her to warfarin. He advised he would address upon hospital stay.

## 2022-03-14 NOTE — PLAN OF CARE
Nutrition Problem #1: Altered nutrition-related lab values  Intervention: Food and/or Nutrient Delivery: Continue Current Diet  Nutritional Goals: Po intake >75%, reduced/improved edema    Problem: Nutrition  Goal: Optimal nutrition therapy  Outcome: Ongoing

## 2022-03-14 NOTE — CONSULTS
[facility]) Cardiology   Consult Note   Say Gomez       Requesting MD:  Juan A Swift MD   Admit Status:         History obtained from:   [x] Patient  [] Other (specify):     Patient:  Burton Blas  [id number]     Chief Complaint:   Chief Complaint   Patient presents with    Shortness of Breath     pt was dc'd from [facility] on tuesday, and has had SOA since leaving       HPI: Ms. Karthik Ling is a 79 y.o. female with a history of chronic systolic congestive heart failure, coronary artery status post CABG, history of hypertension, history of dementia, patient lives at [address] [place] [address] 12 home and she was discharged home few days ago, she mentioned that she was having worsening shortness of breath since then, she was brought to the ER by her family for evaluation, patient was seen in the office in January for severe congestive heart failure and she is taking all her medication for CHF including Entresto Aldactone Lasix, patient was also considered for possible AICD with 2D echo check to assess her LV function    She tells me that she is feeling better today after she was diuresed overnight, she denies any active chest pain or nausea or vomiting    She is not sure about the need for ICD and she wants to discuss with her daughter    Review of Systems:  Review of Systems   Constitutional: Positive for fatigue. Respiratory: Positive for shortness of breath. Cardiovascular: Positive for leg swelling. Gastrointestinal: Negative. Endocrine: Negative. Genitourinary: Negative. Musculoskeletal: Negative. Skin: Negative. Neurological: Negative. Hematological: Negative. All other systems reviewed and are negative.       Cardiac Specific Data:  Specialty Problems        Cardiology Problems    Secondary hypertension        Benign hypertension        Coronary artery disease involving native coronary artery of native heart without angina pectoris        Hyperlipidemia        Left bundle branch block Essential hypertension        CHF (congestive heart failure), NYHA class I, acute on chronic, combined ([facility]        Heart attack ([facility]        Heart disease        * (Principal) Acute on chronic systolic (congestive) heart failure (HCC)        Chronic atrial fibrillation (HCC)              Past Medical History:  Past Medical History:   Diagnosis Date    Arthritis     Bladder infection     CAD (coronary artery disease)     Cardiovascular disease     Chronic kidney disease     Colon polyps     Constipation     Gallbladder disease     GERD (gastroesophageal reflux disease)     Gout     Heart attack ([facility]     Heart disease     Hyperlipidemia     Hypertension     Incontinence     Thyroid disease         Past Surgical History:  Past Surgical History:   Procedure Laterality Date    CHOLECYSTECTOMY      CORONARY ARTERY BYPASS GRAFT      heart bypass    HYSTERECTOMY         Past Family History:  Family History   Problem Relation Age of Onset    Heart Attack Father     Cancer Mother        Past Social History:  Social History     Socioeconomic History    Marital status:      Spouse name: Not on file    Number of children: Not on file    Years of education: Not on file    Highest education level: Not on file   Occupational History    Not on file   Tobacco Use    Smoking status: Never Smoker    Smokeless tobacco: Never Used   Vaping Use    Vaping Use: Never used   Substance and Sexual Activity    Alcohol use: Not Currently    Drug use: Not Currently    Sexual activity: Not Currently   Other Topics Concern    Not on file   Social History Narrative    Not on file     Social Determinants of Health     Financial Resource Strain:     Difficulty of Paying Living Expenses: Not on file   Food Insecurity:     Worried About Running Out of Food in the Last Year: Not on file    Анна of Food in the Last Year: Not on file   Transportation Needs:     Lack of Transportation (Medical):  Not on file aspirin 81 MG EC tablet Take 81 mg by mouth daily   Yes Historical Provider, MD   Cyanocobalamin 1000 MCG LOZG Take 1,000 mcg by mouth daily   Yes Historical Provider, MD   levothyroxine (SYNTHROID) 100 MCG tablet Take 100 mcg by mouth Daily   Yes Historical Provider, MD   magnesium oxide (MAG-OX) 400 MG tablet Take 400 mg by mouth 2 times daily    Yes Historical Provider, MD   vitamin D 25 MCG (1000 UT) CAPS Take 1,000 Units by mouth daily    Yes Historical Provider, MD   carvedilol (COREG) 12.5 MG tablet Take 1 tablet by mouth 2 times daily (with meals) 1/2/22 2/1/22  Francis Hidalgo MD   spironolactone (ALDACTONE) 25 MG tablet Take 1 tablet by mouth daily 1/3/22 2/2/22  Francis Hidalgo MD   allopurinol (ZYLOPRIM) 100 MG tablet Take 100 mg by mouth 2 times daily    Historical Provider, MD   amLODIPine (NORVASC) 5 MG tablet Take 5 mg by mouth daily     Historical Provider, MD   ammonium lactate (LAC-HYDRIN) 12 % lotion Apply topically as needed for Dry Skin Apply topically as needed.     Historical Provider, MD   atorvastatin (LIPITOR) 20 MG tablet Take 20 mg by mouth daily    Historical Provider, MD   nitroglycerin (NITRO-BID) 2 % ointment Place 0.5 inches onto the skin as needed     Historical Provider, MD       Current Meds:   sodium chloride flush  5-40 mL IntraVENous 2 times per day    allopurinol  100 mg Oral BID    amLODIPine  5 mg Oral Daily    aspirin  81 mg Oral Daily    atorvastatin  20 mg Oral Daily    carvedilol  12.5 mg Oral BID     vitamin B-12  1,000 mcg Oral Daily    furosemide  40 mg IntraVENous Daily    Vitamin D  1,000 Units Oral Daily    trospium  20 mg Oral Nightly    magnesium oxide  400 mg Oral BID    isosorbide mononitrate  30 mg Oral Daily    spironolactone  25 mg Oral Daily    levothyroxine  100 mcg Oral Daily    apixaban  5 mg Oral BID    sacubitril-valsartan  1 tablet Oral BID       Current Infused Meds:   sodium chloride         Physical Exam:  Vitals:    03/14/22 0535 BP: 117/65   Pulse: 74   Resp: 24   Temp: 97.2 °F (36.2 °C)   SpO2: 93%       Intake/Output Summary (Last 24 hours) at 3/14/2022 0959  Last data filed at 3/14/2022 0947  Gross per 24 hour   Intake 403 ml   Output 850 ml   Net -447 ml     Estimated body mass index is 30.19 kg/m² as calculated from the following:    Height as of this encounter: 5' 3\" (1.6 m). Weight as of this encounter: 170 lb 6.4 oz (77.3 kg). Physical Exam  Vitals and nursing note reviewed. Constitutional:       Appearance: Normal appearance. Comments: Looks frail    HENT:      Head: Normocephalic and atraumatic. Mouth/Throat:      Mouth: Mucous membranes are moist.   Cardiovascular:      Rate and Rhythm: Normal rate. Rhythm irregular. Pulses: Normal pulses. Heart sounds: Murmur heard. Comments: JVD elevated   Pulmonary:      Effort: Pulmonary effort is normal.      Breath sounds: Rales present. Abdominal:      General: Bowel sounds are normal.      Palpations: Abdomen is soft. Tenderness: There is no abdominal tenderness. Musculoskeletal:         General: Normal range of motion. Right lower leg: Edema present. Left lower leg: Edema present. Skin:     General: Skin is warm. Neurological:      General: No focal deficit present. Mental Status: She is alert and oriented to person, place, and time.    Psychiatric:         Mood and Affect: Mood normal.         Behavior: Behavior normal.         Labs:  Recent Labs     03/13/22  1533 03/13/22 2116   WBC 6.4 6.0   HGB 10.6* 9.6*    174       Recent Labs     03/13/22  1533 03/13/22  2116    136   K 4.7 5.0    102   CO2 21* 19*   BUN 46* 47*   CREATININE 1.8* 1.7*   LABGLOM 27* 29*   MG  --  2.6*   CALCIUM 10.2 9.9       CK, CKMB, Troponin:   Recent Labs     03/13/22  1533   TROPONINI <0.01       Last 3 BNP:  Recent Labs     03/13/22  1533   PROBNP 12,185*       IMAGING:    EKG -     59 BPM  Atrial fibrillation  Left bundle branch block  Comparison Summary: Significant changes  Summary: Abnormal ECG  Compared with:1/1/2022 11:       ECHO - pending       TTE procedure:ECHO NO CONTRAST WITH DOP/COLR. Study Location: Echo Lab  Technical Quality: Good visualization     Patient Status: Inpatient     BP: 129/94 mmHg     Indications:Congestive heart failure and Chest pain.      Conclusions      Summary   LV appears borderline dilated with severely reduced LV systolic function. LV ejection fraction estimated at 25 to 30%. Probable diastolic dysfunction but difficult to assess accurately due to   rhythm. Mild concentric LVH. RV appears normal in size with preserved systolic function. Mild left atrial enlargement. Normal right atrial size. Aortic valve appears trileaflet with moderate leaflet thickening but   preserved mobility. Mild aortic regurgitation. No stenosis. Mitral valve leaflets appear mildly thickened with mildly reduced leaflet   mobility. No stenosis. Mild mitral regurgitation. Mild tricuspid regurgitation. Mild pulmonic regurgitation. No significant pericardial effusion. Signature      ----------------------------------------------------------------   Electronically signed by Henretta Dubin Mani,MD(Interpreting physician)   on 01/01/2022 04:40 PM   ----------------------------------------------------------------      Nuclear stress test      Impression   Impression:   There is large inferior to inferolateral and apical infarct with   minimal ana-infarct ischemia, with a calculated ejection fraction of   15 %. Suggest: Clinical correlation is advised. EKG with left bundle branch   block. Signed by Dr David Felix Additional Contrast? Radiologist Recommendation    Result Date: 3/13/2022  1. Mild constipation. There is diverticulosis of the descending and sigmoid colon with no evidence of acute diverticulitis. 2. No evidence of ascites.  There is atheromatous calcification of the abdominal aorta and branch vessels. No evidence of aneurysm. 3. No evidence of nephrolithiasis or obstructive uropathy. 4. Small effusions with bibasilar atelectasis. There is moderate cardiomegaly. . Signed by Dr Rajan Roger    Result Date: 3/13/2022  1. Mild cardiomegaly no obvious pulmonary vascular congestion. Signed by Dr Valarie Rodriguez and Plan:  1. Acute on chronic severe systolic congestive heart failure with ejection fraction of 20% noted with acute exacerbation of CHF, was found to have fluid overload with elevated proBNP more than 12,000 and chest x-ray showing cardiomegaly and fluid overload with leg swelling and shortness of breath, her symptom has improved since admission after she received IV Lasix, will continue with medical therapy occluding Entresto Aldactone Lasix Imdur, will obtain 2D echo to assess LV function to see if there is any improvement since the last echo, low-sodium diet, strict input and output and daily weight, we can switch her Lasix to oral tablet tomorrow  2. Atrial fibrillation -currently rate controlled continue with beta-blocker, continue with Eliquis for stroke prevention    Further recommendation will follow after complete work-up    Thank you for the consult, we appreciate the opportunity to provide care to your patients. Feel free to contact me if I can be of any further assistance.       Electronically signed by Sheila Nieto MD on 3/14/2022 at 9:59 AM    Sheila Nieto MD, [facility], [facility]  Interventional Cardiologist, Endovascular Specialist   Medical Director, Structural Heart Program   [facility]       (Please note that portions of this note were completed with a voice recognition program.  Effortswere made to edit the dictations but occasionally words are mis-transcribed.)

## 2022-03-14 NOTE — PLAN OF CARE
Problem: Falls - Risk of:  Goal: Will remain free from falls  Description: Will remain free from falls  Outcome: Ongoing  Goal: Absence of physical injury  Description: Absence of physical injury  Outcome: Ongoing     Problem: OXYGENATION/RESPIRATORY FUNCTION  Goal: Patient will maintain patent airway  Outcome: Ongoing  Goal: Patient will achieve/maintain normal respiratory rate/effort  Description: Respiratory rate and effort will be within normal limits for the patient  Outcome: Ongoing     Problem: HEMODYNAMIC STATUS  Goal: Patient has stable vital signs and fluid balance  Outcome: Ongoing     Problem: FLUID AND ELECTROLYTE IMBALANCE  Goal: Fluid and electrolyte balance are achieved/maintained  Outcome: Ongoing     Problem: ACTIVITY INTOLERANCE/IMPAIRED MOBILITY  Goal: Mobility/activity is maintained at optimum level for patient  Outcome: Ongoing     Problem: Nutrition  Goal: Optimal nutrition therapy  3/14/2022 1422 by Dianne Hutchinson RN  Outcome: Ongoing  3/14/2022 1339 by Shasta Runner  Outcome: Ongoing

## 2022-03-14 NOTE — CONSULTS
Nutrition Education    · Verbally reviewed information with Patient  · Educated on CHF diet guidelines. · Written educational materials provided. · Contact name and number provided.     Electronically signed by Faustino Sanchez on 3/14/22 at 1:17 PM CDT    Contact: [phone]

## 2022-03-14 NOTE — PROGRESS NOTES
Physician Progress Note      PATIENT:               Gato Mckeon  CSN #:                  [id number]  :                       1940  ADMIT DATE:       3/13/2022 3:10 PM  [address] [place] DATE:  RESPONDING  PROVIDER #:        Aayush Egan        QUERY TEXT:    Stage of Chronic Kidney Disease: Please provide further specificity, if known. Clinical indicators include: chronic kidney disease, bun, creatinine, gfr,   brain natriuretic peptide, pro-bnp, bnp, probnp  Options provided:  -- Chronic kidney disease stage 1  -- Chronic kidney disease stage 2  -- Chronic kidney disease stage 3  -- Chronic kidney disease stage 3a  -- Chronic kidney disease stage 3b  -- Chronic kidney disease stage 4  -- Chronic kidney disease stage 5  -- Chronic kidney disease stage 5, requiring dialysis  -- End stage renal disease  -- Other - I will add my own diagnosis  -- Disagree - Not applicable / Not valid  -- Disagree - Clinically Unable to determine / Unknown        PROVIDER RESPONSE TEXT:    The patient has chronic kidney disease stage 3.       Electronically signed by:  Aayush Jignesh 3/14/2022 2:47 PM

## 2022-03-15 LAB
ANION GAP SERPL CALCULATED.3IONS-SCNC: 12 MMOL/L (ref 7–19)
BUN BLDV-MCNC: 45 MG/DL (ref 8–23)
CALCIUM SERPL-MCNC: 10.3 MG/DL (ref 8.8–10.2)
CHLORIDE BLD-SCNC: 105 MMOL/L (ref 98–111)
CO2: 23 MMOL/L (ref 22–29)
CREAT SERPL-MCNC: 1.6 MG/DL (ref 0.5–0.9)
GFR AFRICAN AMERICAN: 37
GFR NON-AFRICAN AMERICAN: 31
GLUCOSE BLD-MCNC: 94 MG/DL (ref 74–109)
HCT VFR BLD CALC: 31.4 % (ref 37–47)
HEMOGLOBIN: 9.8 G/DL (ref 12–16)
MAGNESIUM: 2.3 MG/DL (ref 1.6–2.4)
MCH RBC QN AUTO: 27.5 PG (ref 27–31)
MCHC RBC AUTO-ENTMCNC: 31.2 G/DL (ref 33–37)
MCV RBC AUTO: 88.2 FL (ref 81–99)
PDW BLD-RTO: 16.3 % (ref 11.5–14.5)
PLATELET # BLD: 191 K/UL (ref 130–400)
PMV BLD AUTO: 10.4 FL (ref 9.4–12.3)
POTASSIUM SERPL-SCNC: 4.2 MMOL/L (ref 3.5–5)
RBC # BLD: 3.56 M/UL (ref 4.2–5.4)
SODIUM BLD-SCNC: 140 MMOL/L (ref 136–145)
WBC # BLD: 5.4 K/UL (ref 4.8–10.8)

## 2022-03-15 PROCEDURE — 85027 COMPLETE CBC AUTOMATED: CPT

## 2022-03-15 PROCEDURE — 2140000000 HC CCU INTERMEDIATE R&B

## 2022-03-15 PROCEDURE — 6370000000 HC RX 637 (ALT 250 FOR IP): Performed by: INTERNAL MEDICINE

## 2022-03-15 PROCEDURE — 6360000002 HC RX W HCPCS

## 2022-03-15 PROCEDURE — 36415 COLL VENOUS BLD VENIPUNCTURE: CPT

## 2022-03-15 PROCEDURE — 6370000000 HC RX 637 (ALT 250 FOR IP)

## 2022-03-15 PROCEDURE — 83735 ASSAY OF MAGNESIUM: CPT

## 2022-03-15 PROCEDURE — 99233 SBSQ HOSP IP/OBS HIGH 50: CPT | Performed by: INTERNAL MEDICINE

## 2022-03-15 PROCEDURE — 80048 BASIC METABOLIC PNL TOTAL CA: CPT

## 2022-03-15 PROCEDURE — 2580000003 HC RX 258

## 2022-03-15 RX ORDER — FUROSEMIDE 40 MG/1
40 TABLET ORAL DAILY
Status: DISCONTINUED | OUTPATIENT
Start: 2022-03-15 | End: 2022-03-25 | Stop reason: HOSPADM

## 2022-03-15 RX ADMIN — RIVAROXABAN 15 MG: 15 TABLET, FILM COATED ORAL at 21:27

## 2022-03-15 RX ADMIN — SPIRONOLACTONE 25 MG: 25 TABLET ORAL at 08:33

## 2022-03-15 RX ADMIN — Medication 400 MG: at 08:33

## 2022-03-15 RX ADMIN — TROSPIUM CHLORIDE 20 MG: 20 TABLET, FILM COATED ORAL at 21:27

## 2022-03-15 RX ADMIN — CARVEDILOL 12.5 MG: 12.5 TABLET, FILM COATED ORAL at 16:24

## 2022-03-15 RX ADMIN — FUROSEMIDE 40 MG: 10 INJECTION, SOLUTION INTRAMUSCULAR; INTRAVENOUS at 08:34

## 2022-03-15 RX ADMIN — SODIUM CHLORIDE, PRESERVATIVE FREE 10 ML: 5 INJECTION INTRAVENOUS at 08:34

## 2022-03-15 RX ADMIN — ALLOPURINOL 100 MG: 100 TABLET ORAL at 08:33

## 2022-03-15 RX ADMIN — Medication 400 MG: at 21:27

## 2022-03-15 RX ADMIN — ACETAMINOPHEN 650 MG: 325 TABLET ORAL at 16:24

## 2022-03-15 RX ADMIN — FUROSEMIDE 40 MG: 40 TABLET ORAL at 16:24

## 2022-03-15 RX ADMIN — AMLODIPINE BESYLATE 5 MG: 5 TABLET ORAL at 08:33

## 2022-03-15 RX ADMIN — SODIUM CHLORIDE, PRESERVATIVE FREE 10 ML: 5 INJECTION INTRAVENOUS at 21:27

## 2022-03-15 RX ADMIN — CARVEDILOL 12.5 MG: 12.5 TABLET, FILM COATED ORAL at 08:33

## 2022-03-15 RX ADMIN — ALLOPURINOL 100 MG: 100 TABLET ORAL at 21:27

## 2022-03-15 RX ADMIN — CYANOCOBALAMIN TAB 500 MCG 1000 MCG: 500 TAB at 08:33

## 2022-03-15 RX ADMIN — ISOSORBIDE MONONITRATE 30 MG: 60 TABLET, EXTENDED RELEASE ORAL at 08:33

## 2022-03-15 RX ADMIN — ATORVASTATIN CALCIUM 20 MG: 20 TABLET, FILM COATED ORAL at 08:33

## 2022-03-15 RX ADMIN — SACUBITRIL AND VALSARTAN 1 TABLET: 24; 26 TABLET, FILM COATED ORAL at 21:27

## 2022-03-15 RX ADMIN — SACUBITRIL AND VALSARTAN 1 TABLET: 24; 26 TABLET, FILM COATED ORAL at 08:33

## 2022-03-15 RX ADMIN — ASPIRIN 81 MG: 81 TABLET, COATED ORAL at 08:34

## 2022-03-15 RX ADMIN — Medication 1000 UNITS: at 08:33

## 2022-03-15 RX ADMIN — LEVOTHYROXINE SODIUM 100 MCG: 100 TABLET ORAL at 05:46

## 2022-03-15 RX ADMIN — APIXABAN 2.5 MG: 2.5 TABLET, FILM COATED ORAL at 08:33

## 2022-03-15 ASSESSMENT — PAIN SCALES - GENERAL
PAINLEVEL_OUTOF10: 0
PAINLEVEL_OUTOF10: 0
PAINLEVEL_OUTOF10: 9

## 2022-03-15 NOTE — PROGRESS NOTES
Cardiology Progress Note   Alan Cheung MD      Patient:  Kiesha Calvillo  [id number]    Patient Active Problem List    Diagnosis Date Noted    Acute on chronic systolic (congestive) heart failure ([address] 03/13/2022    JENNA (acute kidney injury) ([address] 03/13/2022    Chronic atrial fibrillation ([address] 03/13/2022    Arthritis 01/02/2022    Bladder infection 01/02/2022    Chronic kidney disease 01/02/2022    Colon polyps 01/02/2022    Constipation 01/02/2022    Gallbladder disease 01/02/2022    GERD (gastroesophageal reflux disease) 01/02/2022    Gout 01/02/2022    Heart attack ([address] 01/02/2022    Heart disease 01/02/2022    Incontinence 01/02/2022    Hypothyroidism 01/01/2022    CHF (congestive heart failure), NYHA class I, acute on chronic, combined ([address] 12/31/2021    Acute encephalopathy     Visual hallucinations     Parkinsonism ([address]     Generalized weakness     Essential hypertension     Palliative care patient 11/18/2021    Altered mental status 11/17/2021    Coronary artery disease involving native coronary artery of native heart without angina pectoris 03/08/2021     Overview Note:     CABG x4, 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       Left bundle branch block 03/08/2021     Overview Note:     Chronic      Benign hypertension 03/08/2021    Hyperlipidemia 03/08/2021    Secondary hypertension 02/08/2021       Admit Date:  3/13/2022    Admission Problem List: Present on Admission:   Acute on chronic systolic (congestive) heart failure ([address]   JENNA (acute kidney injury) ([address]   Chronic atrial fibrillation [facility])      Cardiac Specific Data:  Specialty Problems        Cardiology Problems    Secondary hypertension        Benign hypertension        Coronary artery disease involving native coronary artery of native heart without angina pectoris        Hyperlipidemia        Left bundle branch block        Essential hypertension        CHF (congestive heart failure), NYHA class I, acute on chronic, combined ([facility]        Heart attack ([facility]        Heart disease        * (Principal) Acute on chronic systolic (congestive) heart failure (HCC)        Chronic atrial fibrillation (HCC)              Subjective:  Ms. Zain Mack to be doing better since admission and responded well to diuresis with less shortness of breathing and less edema     Echo was done and showed severely depressed LV function with not much change in LV systolic function    Objective:   BP (!) 108/58   Pulse 76   Temp 97.5 °F (36.4 °C)   Resp 16   Ht 5' 3\" (1.6 m)   Wt 165 lb 8 oz (75.1 kg)   SpO2 93%   BMI 29.32 kg/m²       Intake/Output Summary (Last 24 hours) at 3/15/2022 1501  Last data filed at 3/15/2022 1412  Gross per 24 hour   Intake 460 ml   Output 950 ml   Net -490 ml       Prior to Admission medications    Medication Sig Start Date End Date Taking?  Authorizing Provider   sacubitril-valsartan (ENTRESTO) 24-26 MG per tablet Take 1 tablet by mouth 2 times daily   Yes Historical Provider, MD   loperamide (IMODIUM A-D) 2 MG capsule Take 2 mg by mouth 4 times daily as needed for Diarrhea   Yes Historical Provider, MD   nystatin (MYCOSTATIN) POWD powder Apply topically 2 times daily   Yes Historical Provider, MD   acetaminophen (TYLENOL) 325 MG tablet Take 650 mg by mouth every 6 hours as needed for Pain   Yes Historical Provider, MD   furosemide (LASIX) 20 MG tablet Take 1 tablet by mouth daily  Patient taking differently: Take 20 mg by mouth 2 times daily Take one tablet daily 1/4/22  Yes Jose Leiva MD   isosorbide mononitrate (IMDUR) 30 MG extended release tablet Take 1 tablet by mouth daily 1/3/22 3/13/22 Yes Francis Hidalgo MD   tolterodine (DETROL) 2 MG tablet Take 2 mg by mouth nightly   Yes Historical Provider, MD   aspirin 81 MG EC tablet Take 81 mg by mouth daily   Yes Historical Provider, MD   Cyanocobalamin 1000 MCG LOZG Take 1,000 mcg by mouth daily   Yes Historical Provider, MD   levothyroxine (SYNTHROID) 100 MCG tablet Take 100 mcg by mouth Daily   Yes Historical Provider, MD   magnesium oxide (MAG-OX) 400 MG tablet Take 400 mg by mouth 2 times daily    Yes Historical Provider, MD   vitamin D 25 MCG (1000 UT) CAPS Take 1,000 Units by mouth daily    Yes Historical Provider, MD   carvedilol (COREG) 12.5 MG tablet Take 1 tablet by mouth 2 times daily (with meals) 1/2/22 2/1/22  Francis Hidalgo MD   spironolactone (ALDACTONE) 25 MG tablet Take 1 tablet by mouth daily 1/3/22 2/2/22  Francis Hidalgo MD   allopurinol (ZYLOPRIM) 100 MG tablet Take 100 mg by mouth 2 times daily    Historical Provider, MD   amLODIPine (NORVASC) 5 MG tablet Take 5 mg by mouth daily     Historical Provider, MD   ammonium lactate (LAC-HYDRIN) 12 % lotion Apply topically as needed for Dry Skin Apply topically as needed.     Historical Provider, MD   atorvastatin (LIPITOR) 20 MG tablet Take 20 mg by mouth daily    Historical Provider, MD   nitroglycerin (NITRO-BID) 2 % ointment Place 0.5 inches onto the skin as needed     Historical Provider, MD        furosemide  40 mg Oral Daily    apixaban  2.5 mg Oral BID    sodium chloride flush  5-40 mL IntraVENous 2 times per day    allopurinol  100 mg Oral BID    amLODIPine  5 mg Oral Daily    aspirin  81 mg Oral Daily    atorvastatin  20 mg Oral Daily    carvedilol  12.5 mg Oral BID     vitamin B-12  1,000 mcg Oral Daily    Vitamin D  1,000 Units Oral Daily    trospium  20 mg Oral Nightly    magnesium oxide  400 mg Oral BID    isosorbide mononitrate  30 mg Oral Daily    spironolactone  25 mg Oral Daily    levothyroxine  100 mcg Oral Daily    sacubitril-valsartan  1 tablet Oral BID       TELEMETRY: Sinus     Physical Exam:    General - NAD  HEENT - AT NC, EOMI  Neck - No JVD  CVS - S1/S2 present, irregular rhythm, systolic murmur  Lungs - CTA B/L  Abdomen - soft, NT, ND, BS+  Ext - minimal bilateral LE non-pitting edema, better than admission        Lab Data:  CBC:   Recent Labs     03/13/22  8686 03/14/22  1445 03/15/22  0154   WBC 6.0 5.4 5.4   HGB 9.6* 10.1* 9.8*   HCT 32.0* 32.5* 31.4*   MCV 90.7 88.6 88.2    204 191     BMP:   Recent Labs     03/13/22  2116 03/14/22  1445 03/15/22  0154    137 140   K 5.0 4.4 4.2    101 105   CO2 19* 23 23   BUN 47* 46* 45*   CREATININE 1.7* 1.7* 1.6*     LIVER PROFILE:   Recent Labs     03/13/22  1533   AST 17   ALT 11   BILITOT 0.7   ALKPHOS 90     PT/INR:   Recent Labs     03/13/22  1533   PROTIME 21.4*   INR 1.86*     APTT: No results for input(s): APTT in the last 72 hours. CK, CKMB, Troponin:   Recent Labs     03/13/22  1533   TROPONINI <0.01       Last 3 BNP:  No results for input(s): BNP in the last 72 hours. IMAGING:  CT ABDOMEN PELVIS WO CONTRAST Additional Contrast? Radiologist Recommendation    Result Date: 3/13/2022  CT ABDOMEN PELVIS WO CONTRAST 3/13/2022 4:38 PM HISTORY: Abdominal distention COMPARISON: None DLP: 1401 mGy cm. All CT scans are performed using dose optimization techniques as appropriate to the performed exam and include at least one of the following: Automated exposure control, adjustment of the mA and/or kV according to size, and the use of the iterative reconstruction technique. TECHNIQUE: Noncontrast enhanced images of the abdomen and pelvis obtained without oral contrast. FINDINGS: There is moderate cardiomegaly. Coronary calcifications are present. There is no pericardial effusion. Bilateral pleural effusions are present. There is mild bibasilar atelectasis. Rometta Favre LIVER: No focal liver lesion. BILIARY SYSTEM: The gallbladder is surgically absent. No biliary dilatation is present. Rometta Favre PANCREAS: No focal pancreatic lesion. SPLEEN: Unremarkable. KIDNEYS AND ADRENALS: Bilateral kidneys and adrenal glands are unremarkable. The ureters are decompressed and normal in appearance. RETROPERITONEUM: No mass, lymphadenopathy or hemorrhage. There is heavy atheromatous calcification of the abdominal aorta and mesenteric vessels.  GI TRACT: A small hiatal hernia is present. There is mild constipation. Multiple diverticula are noted within the descending and sigmoid colon with no radiographic evidence of acute diverticulitis. Small bowel is normal in distribution and appearance. . No radiographic evidence of acute appendicitis. . OTHER: There is no mesenteric mass, lymphadenopathy or fluid collection. No evidence of ascites. The osseous structures and soft tissues demonstrate no worrisome lesions. No evidence of a ventral wall hernia. PELVIS: The patient's undergone prior hysterectomy. A Verdugo catheter is in place within the bladder. . No bladder stones are present. There is no free fluid in the cul-de-sac. .    1. Mild constipation. There is diverticulosis of the descending and sigmoid colon with no evidence of acute diverticulitis. 2. No evidence of ascites. There is atheromatous calcification of the abdominal aorta and branch vessels. No evidence of aneurysm. 3. No evidence of nephrolithiasis or obstructive uropathy. 4. Small effusions with bibasilar atelectasis. There is moderate cardiomegaly. . Signed by Dr Maggie Brasher    Result Date: 3/13/2022  EXAMINATION: XR CHEST PORTABLE 3/13/2022 4:57 PM HISTORY: XR CHEST PORTABLE 3/13/2022 3:19 PM HISTORY: Short of breath COMPARISON: December 31, 2021. FINDINGS: The lungs are clear. Cardiac silhouettes mildly enlarged. There is fullness in the right hilum. Wires are present previous median sternotomy. . The osseous structures and surrounding soft tissues demonstrate no acute abnormality. 1. Mild cardiomegaly no obvious pulmonary vascular congestion.  Signed by Dr Saúl Arana    VL DUP LOWER EXTREMITY VENOUS BILATERAL    Result Date: 3/14/2022  Vascular Lower Extremities DVT Study Procedure  Demographics   Patient Name     Yusef Dave Age                    79   Patient Number   [id number]         Gender                 Female   Visit Number     [id number]      Interpreting Physician Dagoberto Cunningham   Date of Birth    1940     Referring Physician    Marjorie Patel   Accession Number [id number]     C/ Jonathan [address] RVT  Procedure Type of Study:   Veins:Lower Extremities DVT Study, VL LOWER EXTREMITY BILATERAL VENOUS  DUPLEX . Indications for Study:Edema, bilateral lower extremity. Impression   Normal venous duplex study of the bilateral lower extremity(ies). There is  no evidence of deep or superficial venous thrombosis. Study limited due to patient immobility. Signature   ----------------------------------------------------------------  Electronically signed by Sadiq Cormier(Interpreting  physician) on 03/14/2022 04:47 PM  ----------------------------------------------------------------  Velocities are measured in cm/s ; Diameters are measured in mm Right Lower Extremities DVT Study Measurements Right 2D Measurements +------------------------------------+----------+---------------+----------+ ! Location                            ! Visualized! Compressibility! Thrombosis! +------------------------------------+----------+---------------+----------+ ! Sapheno Femoral Junction            ! Yes       ! Yes            ! None      ! +------------------------------------+----------+---------------+----------+ ! Common Femoral                      !Yes       ! Yes            ! None      ! +------------------------------------+----------+---------------+----------+ ! Prox Femoral                        !Yes       ! Yes            ! None      ! +------------------------------------+----------+---------------+----------+ ! Mid Femoral                         !Yes       ! Yes            ! None      ! +------------------------------------+----------+---------------+----------+ ! Dist Femoral                        !Yes       ! Yes            ! None      ! +------------------------------------+----------+---------------+----------+ ! Deep Femoral                        !Yes       ! Yes !None      ! +------------------------------------+----------+---------------+----------+ ! Popliteal                           !Partial   !Yes            ! None      ! +------------------------------------+----------+---------------+----------+ ! SSV                                 ! Partial   !Yes            ! None      ! +------------------------------------+----------+---------------+----------+ ! Gastroc                             ! No        !               !          ! +------------------------------------+----------+---------------+----------+ ! PTV                                 ! Yes       ! Yes            ! None      ! +------------------------------------+----------+---------------+----------+ ! GSV                                 ! Partial   !Yes            ! None      ! +------------------------------------+----------+---------------+----------+ ! ATV                                 ! Yes       ! Yes            ! None      ! +------------------------------------+----------+---------------+----------+ ! Peroneal                            !Partial   !Yes            ! None      ! +------------------------------------+----------+---------------+----------+ Right Doppler Measurements +---------------------+------+------+--------------------------------------+ ! Location             ! Signal!Reflux! Reflux (msec)                         ! +---------------------+------+------+--------------------------------------+ ! Mid Femoral          !      ! Yes   ! 1416                                  ! +---------------------+------+------+--------------------------------------+ Left Lower Extremities DVT Study Measurements Left 2D Measurements +------------------------------------+----------+---------------+----------+ ! Location                            ! Visualized! Compressibility! Thrombosis! +------------------------------------+----------+---------------+----------+ ! Sapheno Femoral Junction            ! Yes       ! Yes !None      ! +------------------------------------+----------+---------------+----------+ ! Common Femoral                      !Yes       ! Yes            ! None      ! +------------------------------------+----------+---------------+----------+ ! Prox Femoral                        !Yes       ! Yes            ! None      ! +------------------------------------+----------+---------------+----------+ ! Mid Femoral                         !Yes       ! Yes            ! None      ! +------------------------------------+----------+---------------+----------+ ! Dist Femoral                        !Yes       ! Yes            ! None      ! +------------------------------------+----------+---------------+----------+ ! Deep Femoral                        !Yes       ! Yes            ! None      ! +------------------------------------+----------+---------------+----------+ ! Popliteal                           !Partial   !Yes            ! None      ! +------------------------------------+----------+---------------+----------+ ! SSV                                 ! Partial   !Yes            ! None      ! +------------------------------------+----------+---------------+----------+ ! Gastroc                             ! No        !               !          ! +------------------------------------+----------+---------------+----------+ ! PTV                                 ! Yes       ! Yes            ! None      ! +------------------------------------+----------+---------------+----------+ ! GSV                                 ! Partial   !Yes            ! None      ! +------------------------------------+----------+---------------+----------+ ! ATV                                 ! Yes       ! Yes            ! None      ! +------------------------------------+----------+---------------+----------+ ! Peroneal                            !Partial   !Yes            ! None      ! +------------------------------------+----------+---------------+----------+    ECHO 2D WO COLOR DOPPLER COMPLETE    Result Date: 3/14/2022  Transthoracic Echocardiography Report (TTE)  Demographics   Patient Name  Miguelina Signs Date of Study         03/14/2022   MRN           [id number]         Gender                Female   Date of Birth 1940     Room Number           [id number]   Age           79 year(s)   Height:       63 inches      Referring Physician   Pedrito Melissa MD   Weight:       170 pounds     Sonographer           Francie Lino RDCS   BSA:          1.8 m^2        Interpreting          Nigel Woods MD                               Physician   BMI:          30.11 kg/m^2  Procedure Type of Study   TTE procedure:ECHO 2D W/DOPPLER/CONTRAST LIMITD. Study Location: Echo Lab Technical Quality: Adequate visualization Patient Status: Inpatient Contrast Medium: Definity. Amount - 4 ml HR: 73 bpm BP: 123/67 mmHg Indications:Congestive heart failure. Conclusions   Summary  Limited echocardiographic study. LV is severely dilated with severely reduced LV systolic function. LV  ejection fraction estimated at 15 to 20%. No thrombus noted on contrast study. RV appears normal in size with normal systolic function. Moderate left atrial enlargement. Normal right atrial size. Aortic valve is mild to moderately thickened and calcified with preserved  leaflet mobility. Trileaflet. Mild aortic regurgitation. No stenosis. Mitral valve is mildly thickened with preserved leaflet mobility. Mild  mitral calcification. Moderate to severe mitral regurgitation (2-3+). No  stenosis. No significant pericardial effusion.      Signature     ----------------------------------------------------------------    Electronically signed by Nigel Woods MD(Interpreting physician)  on 03/14/2022 06:23 PM  ----------------------------------------------------------------    M-Mode Measurements (cm)   LVIDd: 6.02 cm                                  LVIDs: 5.47 cm  IVSd: 0.96 cm  LVPWd: 0.94 cm  % Ejection Fraction: 17 % LA: 4.5 cm                                                  LVOT: 2.2 cm  Doppler Measurements:   AV Peak Velocity:143 cm/s          MV Peak E-Wave: 101 cm/s  AV Peak Gradient: 8.18 mmHg        MV Peak A-Wave: 50.9 cm/s                                     MV E/A Ratio: 1.98 %  TR Velocity:288 cm/s               MV Peak Gradient: 4.08 mmHg  TR Gradient:33.18 mmHg  Estimated RAP:15 mmHg  RVSP:48 mmHg        Assessment and Plan:    1. Acute on chronic severe systolic congestive heart failure with ejection fraction of 20% on recent echo noted with acute exacerbation of CHF, was found to have fluid overload with elevated proBNP more than 12,000 and chest x-ray showing cardiomegaly and fluid overload with leg swelling and shortness of breath, her symptom has improved since admission after she received IV Lasix, will continue with medical therapy including Entresto Aldactone Lasix Imdur, low-sodium diet, strict input and output and daily weight, switch to oral Lasix today, patient not sure about the ICD decision, she can be discharged medically stable and discussed this option when she comes to the office for postop evaluation in 2 weeks, meanwhile recommend she goes home on LifeVest  2.  Atrial fibrillation -currently rate controlled continue with beta-blocker, continue with Eliquis for stroke prevention    2D echo reviewed      Ramses Quintanilla MD, MD 3/15/2022 3:01 PM      Ramses Quintanilla MD, [facility]  Interventional Cardiologist, Endovascular Specialist   Medical Director, Structural Heart Program   [facility]        (Please note that portions of this note were completed with a voice recognition program.  Pietro Pigaaron made to edit the dictations but occasionally words are mis-transcribed.)

## 2022-03-15 NOTE — PROGRESS NOTES
Daily Progress Note    Date:3/15/2022  Patient: Ck Ospina  : 1940  J:[id number]  CODE:DNR No additional code details  PCP:Delonte Myles    Admit Date: 3/13/2022  3:10 PM   LOS: 2 days       Hospital Course: Patient was admitted on 3/13 with worsening SOB and leg swelling. She was recently diagnosed with severe CHF and was started on entresto on her previous admission with plans to repeat a TTE at the end of march for possible ICD placement. She has been receiving diuresis with IV lasix and aldactone on this admission. Cardiology has been consulted with plans to transition to oral lasix today. A decision needs to be made about ICD placement, as a repeat TTE shows 25-30% EF and abnormalities (minimal change from her previous). Subjective: Patient is stable today. She is breathing well and feels like her swelling has gone down. No overnight events.     Review of Systems  Negative unless noted above    Objective:      Vital signs in last 24 hours:  Patient Vitals for the past 24 hrs:   BP Temp Temp src Pulse Resp SpO2 Height Weight   03/15/22 0546 136/75 96.6 °F (35.9 °C) Temporal 77 16 96 % -- --   03/15/22 0505 -- -- -- -- -- -- -- 165 lb 8 oz (75.1 kg)   03/15/22 0018 (!) 95/48 97 °F (36.1 °C) Temporal 118 16 93 % -- --   22 -- -- -- 80 -- -- -- --   22 1802 121/64 96.4 °F (35.8 °C) Temporal 77 14 93 % -- --   22 1324 -- -- -- -- -- -- 5' 3\" (1.6 m) --   22 1126 123/67 96 °F (35.6 °C) Temporal 81 14 95 % -- --         Lab Review   Recent Results (from the past 24 hour(s))   Basic Metabolic Panel    Collection Time: 22  2:45 PM   Result Value Ref Range    Sodium 137 136 - 145 mmol/L    Potassium 4.4 3.5 - 5.0 mmol/L    Chloride 101 98 - 111 mmol/L    CO2 23 22 - 29 mmol/L    Anion Gap 13 7 - 19 mmol/L    Glucose 127 (H) 74 - 109 mg/dL    BUN 46 (H) 8 - 23 mg/dL    CREATININE 1.7 (H) 0.5 - 0.9 mg/dL    GFR Non-African American 29 (A) >60    GFR  American 35 (L) >59    Calcium 10.5 (H) 8.8 - 10.2 mg/dL   CBC    Collection Time: 03/14/22  2:45 PM   Result Value Ref Range    WBC 5.4 4.8 - 10.8 K/uL    RBC 3.67 (L) 4.20 - 5.40 M/uL    Hemoglobin 10.1 (L) 12.0 - 16.0 g/dL    Hematocrit 32.5 (L) 37.0 - 47.0 %    MCV 88.6 81.0 - 99.0 fL    MCH 27.5 27.0 - 31.0 pg    MCHC 31.1 (L) 33.0 - 37.0 g/dL    RDW 16.5 (H) 11.5 - 14.5 %    Platelets 360 119 - 846 K/uL    MPV 10.8 9.4 - 12.3 fL   Magnesium    Collection Time: 03/14/22  2:45 PM   Result Value Ref Range    Magnesium 2.4 1.6 - 2.4 mg/dL   Basic Metabolic Panel    Collection Time: 03/15/22  1:54 AM   Result Value Ref Range    Sodium 140 136 - 145 mmol/L    Potassium 4.2 3.5 - 5.0 mmol/L    Chloride 105 98 - 111 mmol/L    CO2 23 22 - 29 mmol/L    Anion Gap 12 7 - 19 mmol/L    Glucose 94 74 - 109 mg/dL    BUN 45 (H) 8 - 23 mg/dL    CREATININE 1.6 (H) 0.5 - 0.9 mg/dL    GFR Non- 31 (A) >60    GFR  37 (L) >59    Calcium 10.3 (H) 8.8 - 10.2 mg/dL   CBC    Collection Time: 03/15/22  1:54 AM   Result Value Ref Range    WBC 5.4 4.8 - 10.8 K/uL    RBC 3.56 (L) 4.20 - 5.40 M/uL    Hemoglobin 9.8 (L) 12.0 - 16.0 g/dL    Hematocrit 31.4 (L) 37.0 - 47.0 %    MCV 88.2 81.0 - 99.0 fL    MCH 27.5 27.0 - 31.0 pg    MCHC 31.2 (L) 33.0 - 37.0 g/dL    RDW 16.3 (H) 11.5 - 14.5 %    Platelets 544 248 - 468 K/uL    MPV 10.4 9.4 - 12.3 fL   Magnesium    Collection Time: 03/15/22  1:54 AM   Result Value Ref Range    Magnesium 2.3 1.6 - 2.4 mg/dL       I/O last 3 completed shifts: In: 5 [P.O.:800;  I.V.:3]  Out: 2200 [Urine:2200]  I/O this shift:  In: 120 [P.O.:120]  Out: -       Current Facility-Administered Medications:     perflutren lipid microspheres (DEFINITY) injection 1.65 mg, 1.5 mL, IntraVENous, ONCE PRN, Marcelle Alexander MD, 1.5 mL at 03/14/22 1547    apixaban (ELIQUIS) tablet 2.5 mg, 2.5 mg, Oral, BID, Ramses Quintanilla MD, 2.5 mg at 03/15/22 0856    sodium chloride flush 0.9 % injection 5-40 mL, 5-40 mL, IntraVENous, 2 times per day, Colletta Tampa, APRN - CNP, 10 mL at 03/15/22 0834    sodium chloride flush 0.9 % injection 5-40 mL, 5-40 mL, IntraVENous, PRN, Colletta Tampa, APRN - CNP    0.9 % sodium chloride infusion, 25 mL, IntraVENous, PRN, Colletta Tampa, APRN - CNP    ondansetron (ZOFRAN-ODT) disintegrating tablet 4 mg, 4 mg, Oral, Q8H PRN **OR** ondansetron (ZOFRAN) injection 4 mg, 4 mg, IntraVENous, Q6H PRN, Colletta Tampa, APRN - CNP    polyethylene glycol (GLYCOLAX) packet 17 g, 17 g, Oral, Daily PRN, Colletta Tampa, APRN - CNP    acetaminophen (TYLENOL) tablet 650 mg, 650 mg, Oral, Q6H PRN, 650 mg at 03/14/22 0529 **OR** acetaminophen (TYLENOL) suppository 650 mg, 650 mg, Rectal, Q6H PRN, Colletta Tampa, APRN - CNP    allopurinol (ZYLOPRIM) tablet 100 mg, 100 mg, Oral, BID, Colletta Tampa, APRN - CNP, 100 mg at 03/15/22 3591    amLODIPine (NORVASC) tablet 5 mg, 5 mg, Oral, Daily, Colletta Tampa, APRN - CNP, 5 mg at 03/15/22 3730    aspirin EC tablet 81 mg, 81 mg, Oral, Daily, Colletta Tampa, APRN - CNP, 81 mg at 03/15/22 0834    atorvastatin (LIPITOR) tablet 20 mg, 20 mg, Oral, Daily, Colletta Tampa, APRN - CNP, 20 mg at 03/15/22 2405    carvedilol (COREG) tablet 12.5 mg, 12.5 mg, Oral, BID WC, Bryanttta Tampa, APRN - CNP, 12.5 mg at 03/15/22 9009    vitamin B-12 (CYANOCOBALAMIN) tablet 1,000 mcg, 1,000 mcg, Oral, Daily, Colletta Tampa, APRN - CNP, 1,000 mcg at 03/15/22 6671    furosemide (LASIX) injection 40 mg, 40 mg, IntraVENous, Daily, Colletta Rosebush, APRN - CNP, 40 mg at 03/15/22 0834    Vitamin D (CHOLECALCIFEROL) tablet 1,000 Units, 1,000 Units, Oral, Daily, Colletta Rosebush, APRN - CNP, 1,000 Units at 03/15/22 6670    trospium (SANCTURA) tablet 20 mg, 20 mg, Oral, Nightly, Colletta Rosebush, APRN - CNP, 20 mg at 03/14/22 2051    loperamide (IMODIUM) capsule 2 mg, 2 mg, Oral, 4x Daily PRN, Colletta Rosebush, APRN - CNP    magnesium oxide (MAG-OX) tablet 400 mg, 400 mg, Oral, BID, Oliver Brooke APRN - CNP, 400 mg at 03/15/22 7992    isosorbide mononitrate (IMDUR) extended release tablet 30 mg, 30 mg, Oral, Daily, Oliver Brooke APRN - CNP, 30 mg at 03/15/22 7399    spironolactone (ALDACTONE) tablet 25 mg, 25 mg, Oral, Daily, Oliver Brooke APRN - CNP, 25 mg at 03/15/22 3531    levothyroxine (SYNTHROID) tablet 100 mcg, 100 mcg, Oral, Daily, Oliver Brooke APRN - CNP, 100 mcg at 03/15/22 0546    sacubitril-valsartan (ENTRESTO) 24-26 MG per tablet 1 tablet, 1 tablet, Oral, BID, Oliver Brooke APRN - CNP, 1 tablet at 03/15/22 5651      Physical Exam    General - NAD  HEENT - AT NC, EOMI  Neck - No JVD  CVS - S1/S2 present, irregular rhythm, systolic murmur  Lungs - CTA B/L  Abdomen - soft, NT, ND, BS+  Ext - minimal bilateral LE non-pitting edema  Skin - dry skin changes, venous stasis dermatitis    Assessment and Plan    1) acute exacerbation of severe systolic CHF  - continue IV lasix  - TTE shows EF of 25-30%, no significant improvement from previous  - cardiology consulted, appreciate recs  - will differ lasix transition to cardiology team  - patient may benefit from ICD placement  - strict I/O and daily weights  - monitor on telemetry    2) JENNA  - mildly improved  - will continue aldactone, lasix and entresto per cardiology recs  - monitor with daily labs    3) A-fib  - controlled  - coreg, eliquis    4) HTN  - home meds    5) hypothyroidism  - synthroid    6) HLD  - lipitor     7) CAD  - home meds    8) GI/DVT ppx  - none/eliquis          Donell Roberson MD 3/15/2022 9:39 AM

## 2022-03-15 NOTE — CARE COORDINATION
Contacted pt's listed pharmacy [facility] who reports eliquis is not on the formulary with her insurance and prefers xarelto. SW attempted to notify cardio RN navigator Charles and unable to leave a VM at her extension.

## 2022-03-15 NOTE — PLAN OF CARE
Problem: Falls - Risk of:  Goal: Will remain free from falls  Description: Will remain free from falls  Outcome: Ongoing  Goal: Absence of physical injury  Description: Absence of physical injury  Outcome: Ongoing     Problem: OXYGENATION/RESPIRATORY FUNCTION  Goal: Patient will maintain patent airway  Outcome: Ongoing  Goal: Patient will achieve/maintain normal respiratory rate/effort  Description: Respiratory rate and effort will be within normal limits for the patient  Outcome: Ongoing     Problem: HEMODYNAMIC STATUS  Goal: Patient has stable vital signs and fluid balance  Outcome: Ongoing     Problem: FLUID AND ELECTROLYTE IMBALANCE  Goal: Fluid and electrolyte balance are achieved/maintained  Outcome: Ongoing     Problem: ACTIVITY INTOLERANCE/IMPAIRED MOBILITY  Goal: Mobility/activity is maintained at optimum level for patient  Outcome: Ongoing     Problem: Nutrition  Goal: Optimal nutrition therapy  Outcome: Ongoing

## 2022-03-16 ENCOUNTER — APPOINTMENT (OUTPATIENT)
Dept: GENERAL RADIOLOGY | Age: 82
DRG: 291 | End: 2022-03-16
Payer: MEDICARE

## 2022-03-16 ENCOUNTER — APPOINTMENT (OUTPATIENT)
Dept: CT IMAGING | Age: 82
DRG: 291 | End: 2022-03-16
Payer: MEDICARE

## 2022-03-16 LAB
ANION GAP SERPL CALCULATED.3IONS-SCNC: 15 MMOL/L (ref 7–19)
BUN BLDV-MCNC: 44 MG/DL (ref 8–23)
CALCIUM SERPL-MCNC: 10.3 MG/DL (ref 8.8–10.2)
CHLORIDE BLD-SCNC: 100 MMOL/L (ref 98–111)
CO2: 24 MMOL/L (ref 22–29)
CREAT SERPL-MCNC: 1.6 MG/DL (ref 0.5–0.9)
GFR AFRICAN AMERICAN: 37
GFR NON-AFRICAN AMERICAN: 31
GLUCOSE BLD-MCNC: 91 MG/DL (ref 74–109)
HCT VFR BLD CALC: 33.3 % (ref 37–47)
HEMOGLOBIN: 10.2 G/DL (ref 12–16)
MAGNESIUM: 2.3 MG/DL (ref 1.6–2.4)
MCH RBC QN AUTO: 26.5 PG (ref 27–31)
MCHC RBC AUTO-ENTMCNC: 30.6 G/DL (ref 33–37)
MCV RBC AUTO: 86.5 FL (ref 81–99)
PDW BLD-RTO: 16.3 % (ref 11.5–14.5)
PLATELET # BLD: 201 K/UL (ref 130–400)
PMV BLD AUTO: 11.3 FL (ref 9.4–12.3)
POTASSIUM SERPL-SCNC: 4.5 MMOL/L (ref 3.5–5)
RBC # BLD: 3.85 M/UL (ref 4.2–5.4)
SODIUM BLD-SCNC: 139 MMOL/L (ref 136–145)
WBC # BLD: 6.1 K/UL (ref 4.8–10.8)

## 2022-03-16 PROCEDURE — 2580000003 HC RX 258

## 2022-03-16 PROCEDURE — 83735 ASSAY OF MAGNESIUM: CPT

## 2022-03-16 PROCEDURE — 6370000000 HC RX 637 (ALT 250 FOR IP)

## 2022-03-16 PROCEDURE — 73080 X-RAY EXAM OF ELBOW: CPT

## 2022-03-16 PROCEDURE — 2580000003 HC RX 258: Performed by: STUDENT IN AN ORGANIZED HEALTH CARE EDUCATION/TRAINING PROGRAM

## 2022-03-16 PROCEDURE — 80048 BASIC METABOLIC PNL TOTAL CA: CPT

## 2022-03-16 PROCEDURE — 90714 TD VACC NO PRESV 7 YRS+ IM: CPT | Performed by: NURSE PRACTITIONER

## 2022-03-16 PROCEDURE — 36415 COLL VENOUS BLD VENIPUNCTURE: CPT

## 2022-03-16 PROCEDURE — 85027 COMPLETE CBC AUTOMATED: CPT

## 2022-03-16 PROCEDURE — 6370000000 HC RX 637 (ALT 250 FOR IP): Performed by: INTERNAL MEDICINE

## 2022-03-16 PROCEDURE — 70450 CT HEAD/BRAIN W/O DYE: CPT

## 2022-03-16 PROCEDURE — 2140000000 HC CCU INTERMEDIATE R&B

## 2022-03-16 PROCEDURE — 6360000002 HC RX W HCPCS: Performed by: NURSE PRACTITIONER

## 2022-03-16 PROCEDURE — 90471 IMMUNIZATION ADMIN: CPT | Performed by: NURSE PRACTITIONER

## 2022-03-16 PROCEDURE — 71045 X-RAY EXAM CHEST 1 VIEW: CPT

## 2022-03-16 RX ORDER — POLYETHYLENE GLYCOL 3350 17 G/17G
17 POWDER, FOR SOLUTION ORAL DAILY
Status: DISCONTINUED | OUTPATIENT
Start: 2022-03-17 | End: 2022-03-25 | Stop reason: HOSPADM

## 2022-03-16 RX ORDER — LEVOTHYROXINE SODIUM 112 UG/1
112 TABLET ORAL DAILY
Status: DISCONTINUED | OUTPATIENT
Start: 2022-03-17 | End: 2022-03-25 | Stop reason: HOSPADM

## 2022-03-16 RX ORDER — FUROSEMIDE 20 MG/1
40 TABLET ORAL DAILY
Qty: 60 TABLET | Refills: 0 | Status: SHIPPED | OUTPATIENT
Start: 2022-03-16 | End: 2022-03-25 | Stop reason: SDUPTHER

## 2022-03-16 RX ORDER — DOCUSATE SODIUM 100 MG/1
100 CAPSULE, LIQUID FILLED ORAL DAILY
Status: DISCONTINUED | OUTPATIENT
Start: 2022-03-17 | End: 2022-03-25 | Stop reason: HOSPADM

## 2022-03-16 RX ORDER — DEXTROSE, SODIUM CHLORIDE, SODIUM LACTATE, POTASSIUM CHLORIDE, AND CALCIUM CHLORIDE 5; .6; .31; .03; .02 G/100ML; G/100ML; G/100ML; G/100ML; G/100ML
INJECTION, SOLUTION INTRAVENOUS CONTINUOUS
Status: DISCONTINUED | OUTPATIENT
Start: 2022-03-16 | End: 2022-03-25 | Stop reason: HOSPADM

## 2022-03-16 RX ORDER — TETANUS AND DIPHTHERIA TOXOIDS ADSORBED 2; 2 [LF]/.5ML; [LF]/.5ML
0.5 INJECTION INTRAMUSCULAR ONCE
Status: COMPLETED | OUTPATIENT
Start: 2022-03-16 | End: 2022-03-16

## 2022-03-16 RX ADMIN — SPIRONOLACTONE 25 MG: 25 TABLET ORAL at 09:33

## 2022-03-16 RX ADMIN — ATORVASTATIN CALCIUM 20 MG: 20 TABLET, FILM COATED ORAL at 09:33

## 2022-03-16 RX ADMIN — SODIUM CHLORIDE, PRESERVATIVE FREE 10 ML: 5 INJECTION INTRAVENOUS at 20:31

## 2022-03-16 RX ADMIN — SODIUM CHLORIDE, SODIUM LACTATE, POTASSIUM CHLORIDE, CALCIUM CHLORIDE AND DEXTROSE MONOHYDRATE: 5; 600; 310; 30; 20 INJECTION, SOLUTION INTRAVENOUS at 15:38

## 2022-03-16 RX ADMIN — Medication 400 MG: at 20:26

## 2022-03-16 RX ADMIN — AMLODIPINE BESYLATE 5 MG: 5 TABLET ORAL at 09:34

## 2022-03-16 RX ADMIN — ACETAMINOPHEN 650 MG: 325 TABLET ORAL at 15:47

## 2022-03-16 RX ADMIN — CARVEDILOL 12.5 MG: 12.5 TABLET, FILM COATED ORAL at 17:31

## 2022-03-16 RX ADMIN — ALLOPURINOL 100 MG: 100 TABLET ORAL at 09:33

## 2022-03-16 RX ADMIN — ISOSORBIDE MONONITRATE 30 MG: 60 TABLET, EXTENDED RELEASE ORAL at 09:33

## 2022-03-16 RX ADMIN — SODIUM CHLORIDE, PRESERVATIVE FREE 10 ML: 5 INJECTION INTRAVENOUS at 09:34

## 2022-03-16 RX ADMIN — Medication 400 MG: at 09:33

## 2022-03-16 RX ADMIN — SACUBITRIL AND VALSARTAN 1 TABLET: 24; 26 TABLET, FILM COATED ORAL at 09:33

## 2022-03-16 RX ADMIN — ALLOPURINOL 100 MG: 100 TABLET ORAL at 20:26

## 2022-03-16 RX ADMIN — SACUBITRIL AND VALSARTAN 1 TABLET: 24; 26 TABLET, FILM COATED ORAL at 20:26

## 2022-03-16 RX ADMIN — TETANUS AND DIPHTHERIA TOXOIDS ADSORBED 0.5 ML: 2; 2 INJECTION INTRAMUSCULAR at 15:39

## 2022-03-16 RX ADMIN — FUROSEMIDE 40 MG: 40 TABLET ORAL at 09:33

## 2022-03-16 RX ADMIN — Medication 1000 UNITS: at 09:34

## 2022-03-16 RX ADMIN — CYANOCOBALAMIN TAB 500 MCG 1000 MCG: 500 TAB at 09:33

## 2022-03-16 RX ADMIN — CARVEDILOL 12.5 MG: 12.5 TABLET, FILM COATED ORAL at 09:34

## 2022-03-16 RX ADMIN — ASPIRIN 81 MG: 81 TABLET, COATED ORAL at 09:33

## 2022-03-16 RX ADMIN — LEVOTHYROXINE SODIUM 100 MCG: 100 TABLET ORAL at 06:12

## 2022-03-16 ASSESSMENT — PAIN SCALES - GENERAL
PAINLEVEL_OUTOF10: 0
PAINLEVEL_OUTOF10: 9
PAINLEVEL_OUTOF10: 0

## 2022-03-16 NOTE — SIGNIFICANT EVENT
Rounding on 7th when aide alerted staff that pt had fallen out of bed. Pt was on floor on right side. She complained of right elbow with skin tear and bleeding. Two egg size bruised areas to the forehead and scalp on right. Pt awake, follows commands. Unable to assess pupils. Appears to have had cataracts. MD notified. Placed back in bed. Pt bed rails were up when pt found . She was leaning over trying to reach something on the bed side table. Dressing applied to right elbow.   Bleeding controlled

## 2022-03-16 NOTE — CARE COORDINATION
[facility] Intake   P: [phone]  F: [address] RepSharyl Paget cell [phone] or Crystal Paget cell [phone]              Patient Information    Patient Name   Uzma Rosenbaum ([id number]) Legal Sex   Female    1940   Room Bed   0708 [id number]       Patient Demographics    Address   [place] [address] Phone   [phone] [facility]   [phone] [facility] E-mail Address   [email or web address]     Social Security Number    SSN        Basic Information    Date Of Birth   1940 Gender Identity   Female Race   White (non-) Ethnic Group   Non- / Non  Preferred Language   English Preferred Written Language   English     Admission Information      Current Information    Attending Provider Admitting Provider Admission Type Admission Status   [facility] Emergency Confirmed Admission          Admission Date/Time Discharge Date Hospital Service Auth/Cert Status     03:10 PM  Med/Surg [facility] Unit Room/Bed    [address] [facility] Sherry 1690 [id number]                Admission    Complaint        PCP and Center    Primary Care Provider [address] [facility] [address] [facility]     Payor Information             PRIMARY INSURANCE   Payor: MEDICARE Plan: MEDICARE PART A AND B   Group Number:   Insurance Type: INDEMNITY   Subscriber Name: Raudel Perez : 1940   Subscriber ID: [id number]       Pat. Rel. to Subscriber: Self       SECONDARY INSURANCE   Payor: [address] [place] [address] West: [address] [place] [address] ADMINISTR*   Group Number: Plan [id number] Insurance Type: [id number] Name: Raudel Perez : 1940   Subscriber ID: [id number]       Pat.  Rel. to Subscriber: SELF              Documents on File     Status Date Received Description   Documents for the Patient   (OLD) [facility]) Physician Consent and NPP Not Received Physician Office Consent for Treatment Not Received     Financial Responsibility Form Not Received     Financial Assistance Program Applications Not Received     [facility] Adult Proxy Access Not Received     [place] Child Proxy Access Not Received     ACO Consent for the Release of  Confidential Alcohol or Drug Treatment Information Not Received     ACO Declining to [address] Not Received     [facility] Physician Communication Release NPP Not Received     [facility]) Physician Consent and NPP Not Received     Referral Form Received 21    Outside Record Received 21    Lab Result Scan Received 21   Outside Record Received 21 facesheet   Miscellaneous Received 21 HIPPA,    Progress Notes Received 21   [facility] Physician Communication Release NPP Signed 21    [facility]) Physician Consent and NPP Signed () 21    Text Reminder Consent Received () 21    Insurance Card Received 21 medicare   Insurance Card Received 21 [place] diane   Photo ID Received () 21    HIM LEXY Authorization  22 TO 2022   Photo ID  (Deleted)     Insurance Card  (Deleted)     Insurance Card Received (Deleted) 21 humana   [facility] Release of Information Output  22 Requested records   Documents for the [facility] Consent Treat/HIPAA Received 22    [facility] - Medicare Second Copy Given to Pt      Medicare Important Message Received 22    After Visit Summary   COVID-19 Results Follow-up   After Visit Summary   IP After Visit Summary   EKG Scanned Received 22    EKG Received 22 EKG 12-LEAD on the ekg interface   Vascular Study Received 22 VL LOWER EXTREMITY BILATERAL VENOUS DUPLEX   Echocardiogram Received 22 ECHOCARDIOGRAM COMPLETE 2D [address] Problem List  Date Reviewed: 1/2/2022     ICD-10-CM Priority Class Noted POA   Acute on chronic systolic heart failure, NYHA class 4 ([address] I50.23   3/13/2022 Yes   JENNA (acute kidney injury) ([address] N17.9   3/13/2022 Yes   Chronic atrial fibrillation ([address] I48.20   3/13/2022 Yes     Non-Hospital Problem List  Date Reviewed: 1/2/2022     ICD-10-CM Priority Class Noted   Secondary hypertension I15.9   2/8/2021   Coronary artery disease involving native coronary artery of native heart without angina pectoris I25.10   3/8/2021   Overview Signed 3/8/2021  3:37 PM by Janay Mcneil DO    CABG x4, 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 Left bundle branch block I44.7   3/8/2021   Overview Signed 3/8/2021  3:37 PM by Janay Mcneil DO    Chronic      Benign hypertension I10   3/8/2021   Hyperlipidemia E78.5   3/8/2021   Altered mental status R41.82   11/17/2021   Palliative care patient Z51.5   11/18/2021   Acute encephalopathy G93.40   Unknown   Visual hallucinations R44.1   Unknown   Parkinsonism ([address] G20   Unknown   Generalized weakness R53.1   Unknown   Essential hypertension I10   Unknown   CHF (congestive heart failure), NYHA class I, acute on chronic, combined ([address] I50.43   12/31/2021   Hypothyroidism E03.9   1/1/2022   Arthritis M19.90   1/2/2022   Bladder infection N30.90   1/2/2022   Chronic kidney disease N18.9   1/2/2022   Colon polyps K63.5   1/2/2022   Constipation K59.00   1/2/2022   Gallbladder disease K82.9   1/2/2022   GERD (gastroesophageal reflux disease) K21.9   1/2/2022   Gout M10.9   1/2/2022   Heart attack ([address] I21.9   1/2/2022   Heart disease I51.9   1/2/2022   Incontinence R32   1/2/2022           ECHO 2D WO COLOR DOPPLER COMPLETE  Study Date: 03/14/2022  Patient:  Epimenio Course 79 y.o.     Reading provider:  Cesilia Edmondson MD   Ordering provider:  Hari Velásquez MD     Result Information    Status: Final result (Resulted: 3/14/2022 18:23) Provider Status: Ordered     ECHO 2D WO COLOR DOPPLER COMPLETE  Order: [id number]   Status: Final result       Visible to patient: Yes (not seen)       Next appt: 03/28/2022 at 02:30 PM in Cardiology Mallissa Bad, DO)       0 Result Notes      Details    Reading Physician Reading Date Result Priority   Magda Ramsay MD  [phone] 3/14/2022      Narrative & Impression  Transthoracic Echocardiography Report (TTE)      Demographics      Patient Name  Hector Ceja Date of Study         03/14/2022      MRN           [id number]         Gender                Female      Date of Birth 1940     Room Number           [id number]      Age           79 year(s)      Height:       63 inches      Referring Physician   Carlitos Segovia MD      Weight:       170 pounds     Sonographer           Francie Lino RDCS      BSA:          1.8 m^2        Interpreting          Abhishek Woods MD                                Physician      BMI:          30.11 kg/m^2     Procedure     Type of Study      TTE procedure:ECHO 2D W/DOPPLER/CONTRAST LIMITD. Study Location: Echo Lab  Technical Quality: Adequate visualization     Patient Status: Inpatient     Contrast Medium: Definity. Amount - 4 ml     HR: 73 bpm BP: 123/67 mmHg     Indications:Congestive heart failure.      Conclusions      Summary   Limited echocardiographic study. LV is severely dilated with severely reduced LV systolic function. LV   ejection fraction estimated at 15 to 20%. No thrombus noted on contrast study. RV appears normal in size with normal systolic function. Moderate left atrial enlargement. Normal right atrial size. Aortic valve is mild to moderately thickened and calcified with preserved   leaflet mobility. Trileaflet. Mild aortic regurgitation. No stenosis. Mitral valve is mildly thickened with preserved leaflet mobility. Mild   mitral calcification. Moderate to severe mitral regurgitation (2-3+). No   stenosis. No significant pericardial effusion.       Signature ----------------------------------------------------------------   Electronically signed by Chris Woods MD(Interpreting physician)   on 03/14/2022 06:23 PM   ----------------------------------------------------------------     M-Mode Measurements (cm)      LVIDd: 6.02 cm                                  LVIDs: 5.47 cm   IVSd: 0.96 cm   LVPWd: 0.94 cm   % Ejection Fraction: 17 %                       LA: 4.5 cm                                                   LVOT: 2.2 cm     Doppler Measurements:      AV Peak Velocity:143 cm/s          MV Peak E-Wave: 101 cm/s   AV Peak Gradient: 8.18 mmHg        MV Peak A-Wave: 50.9 cm/s                                      MV E/A Ratio: 1.98 %   TR Velocity:288 cm/s               MV Peak Gradient: 4.08 mmHg   TR Gradient:33.18 mmHg   Estimated RAP:15 mmHg   RVSP:48 mmHg                  Specimen Collected: 03/14/22 15:05 Last Resulted: 03/14/22 18:23       Order Details       View Encounter       Lab and Collection Details       Routing       Result History               Scans on Order [id number]    Scan on 3/14/2022  6:23 PM: ECHOCARDIOGRAM COMPLETE 2D [address] [facility] on 3/14/2022  6:23 PM: [facility]           Result Care Coordination      Patient Communication    Released Not seen Back to Top           ECHO 2D WO COLOR DOPPLER COMPLETE: Patient Communication    Released Not seen     Routing History    Priority Sent On From To Message Type    3/14/2022  3:24 PM Clau Pearson Incoming Lab Results From Forest Flor MD CC'd Results     PACS Images     Show images for ECHO 2D WO COLOR DOPPLER COMPLETE    Order Report    Order Details          EEG  Study Date: 11/20/2021  Patient:  Zen Jacob 79 y.o.     Ordering provider:  Monique Sher MD     EEG  Order: [id number]   Status: Final result       Visible to patient: Yes (not seen)       Next appt: 03/28/2022 at 02:30 PM in Cardiology (Anthony Curtis DO)       0 Result Notes             Procedure Note                     Last Resulted: 11/20/21 17:01       Order Details       View Encounter       Lab and Collection Details       Routing       Result History               Result Care Coordination      Patient Communication    Released Not seen Back to Top           EEG: Patient Communication    Released Not seen     Result Information    Status: Final result (Resulted: 11/20/2021 17:01) Provider Status: Open     Order Report    Order Details

## 2022-03-16 NOTE — CARE COORDINATION
Notified recommended life vest for the pt by Dr Rashmi Connor and working on order and referral to [place] [address]. Need confirmed HF class for pt as currently listed as class 1.  Gamaliel's RN reports pt should be listed as class 4 and to have Gamaliel update for order of life vest.     [facility] Intake   P: [phone]  F: [address] [facility] Rep: Christina [facility] cell [phone] or Sudeep [facility] cell [phone]

## 2022-03-16 NOTE — PROGRESS NOTES
Daily Progress Note    Date:3/16/2022  Patient: Melita Jacobo  : 1940  VIR:[id number]  CODE:DNR No additional code details  PCP:Delonte Coelho    Admit Date: 3/13/2022  3:10 PM   LOS: 3 days       Hospital Course: Patient was admitted on 3/13 with worsening SOB and leg swelling. She was recently diagnosed with systolic CHF and was started on entresto on her previous admission with plans to repeat a TTE at the end of march for possible ICD placement. Admitted with acute systolic heart failure in setting of markedly elevated proBNP and JENNA suspected related to cardiorenal syndrome. She received diuresis with IV lasix and aldactone on this admission with significant net negative fluid balance, improvement. Evaluated by cardiology with recommendation to follow-up outpatient to arrange ICD placement. Repeat TTE inpatient revealed EF 25-30%. Patient subsequently transitioned to oral Lasix. Hospital course complicated after patient developed worsening mental status with confusion, then on 3/16 patient fell out of bed with resultant right elbow and head trauma. No evidence of fracture on plain films of elbow. CT head with no intracranial bleeding or other abnormality aside from mild scalp soft tissue swelling in right frontal and parietal region. No evidence of skull fracture. Noted to have inappropriate elevated TSH, so increased Synthroid. Subjective:    Patient more confused than her baseline today and had unwitnessed fall out of bed resulting in right elbow and head trauma with 2 areas of soft tissue swelling, superficial hematoma on scalp with CT head obtained showing no evidence of intracranial bleed or other abnormality. Otherwise shortness of breath has improved over hospital course. Otherwise patient somnolent but arousable but able to answer most questions.         Review of Systems  Unable to complete comprehensive ROS due to AMS        Objective:      Vital signs in last 24 hours:  Patient Vitals for the past 24 hrs:   BP Temp Temp src Pulse Resp SpO2 Weight   03/16/22 1403 121/76 97.7 °F (36.5 °C) Temporal 71 16 95 % --   03/16/22 0523 120/72 96.6 °F (35.9 °C) Temporal 74 16 94 % 166 lb 11.2 oz (75.6 kg)   03/16/22 0100 123/67 98.2 °F (36.8 °C) Temporal 79 16 96 % --   03/15/22 1836 116/78 97.5 °F (36.4 °C) Oral 75 16 94 % --     Physical exam    General: Elderly frail-appearing female, fatigued appearing, no distress  HEENT: two areas of soft tissue swelling/superficial hematoma on scalp, EOMI, pupils are reactive  Cardiovascular: Normal rate, regular rhythm, pulses 2+ and equal  Respiratory: Diminished breath sounds bilaterally without wheezes or rales  Abdomen: Soft, nontender, bowel sounds present  Neurologic: Somnolent but arousable, follows commands, mildly confused  Musculoskeletal: Loss of subcutaneous fat, appears malnourished  Extremities: No clubbing or cyanosis  Skin: Warm and dry    Lab Review   Recent Results (from the past 24 hour(s))   Basic Metabolic Panel    Collection Time: 03/16/22  1:47 AM   Result Value Ref Range    Sodium 139 136 - 145 mmol/L    Potassium 4.5 3.5 - 5.0 mmol/L    Chloride 100 98 - 111 mmol/L    CO2 24 22 - 29 mmol/L    Anion Gap 15 7 - 19 mmol/L    Glucose 91 74 - 109 mg/dL    BUN 44 (H) 8 - 23 mg/dL    CREATININE 1.6 (H) 0.5 - 0.9 mg/dL    GFR Non- 31 (A) >60    GFR  37 (L) >59    Calcium 10.3 (H) 8.8 - 10.2 mg/dL   CBC    Collection Time: 03/16/22  1:47 AM   Result Value Ref Range    WBC 6.1 4.8 - 10.8 K/uL    RBC 3.85 (L) 4.20 - 5.40 M/uL    Hemoglobin 10.2 (L) 12.0 - 16.0 g/dL    Hematocrit 33.3 (L) 37.0 - 47.0 %    MCV 86.5 81.0 - 99.0 fL    MCH 26.5 (L) 27.0 - 31.0 pg    MCHC 30.6 (L) 33.0 - 37.0 g/dL    RDW 16.3 (H) 11.5 - 14.5 %    Platelets 495 899 - 735 K/uL    MPV 11.3 9.4 - 12.3 fL   Magnesium    Collection Time: 03/16/22  1:47 AM   Result Value Ref Range    Magnesium 2.3 1.6 - 2.4 mg/dL       I/O last 3 completed shifts: In: 600 [P.O.:600]  Out: 1204 [Urine:1650]  I/O this shift:   In: 0   Out: 600 [Urine:600]      Current Facility-Administered Medications:     dextrose 5 % in lactated ringers infusion, , IntraVENous, Continuous, Leonardo Mei MD, Last Rate: 50 mL/hr at 03/16/22 1538, New Bag at 03/16/22 1538    [START ON 3/17/2022] polyethylene glycol (GLYCOLAX) packet 17 g, 17 g, Oral, Daily, Leonardo Mei MD  [facility]  [START ON 3/17/2022] docusate sodium (COLACE) capsule 100 mg, 100 mg, Oral, Daily, Leonardo Mei MD  [facility]  Catie Levels ON 3/17/2022] levothyroxine (SYNTHROID) tablet 112 mcg, 112 mcg, Oral, Daily, Leonardo Mei MD    furosemide (LASIX) tablet 40 mg, 40 mg, Oral, Daily, Cathy Sutton MD, 40 mg at 03/16/22 0933    [Held by provider] rivaroxaban (XARELTO) tablet 15 mg, 15 mg, Oral, Dinner, Cathy Sutton MD, 15 mg at 03/15/22 2127    sodium chloride flush 0.9 % injection 5-40 mL, 5-40 mL, IntraVENous, 2 times per day, GIAN Munoz - CNP, 10 mL at 03/16/22 0934    sodium chloride flush 0.9 % injection 5-40 mL, 5-40 mL, IntraVENous, PRN, GIAN Munoz - CNP    0.9 % sodium chloride infusion, 25 mL, IntraVENous, PRN, GIAN Munoz - CNP    ondansetron (ZOFRAN-ODT) disintegrating tablet 4 mg, 4 mg, Oral, Q8H PRN **OR** ondansetron (ZOFRAN) injection 4 mg, 4 mg, IntraVENous, Q6H PRN, Jessica Montoya APRN - CNP    acetaminophen (TYLENOL) tablet 650 mg, 650 mg, Oral, Q6H PRN, 650 mg at 03/16/22 1547 **OR** acetaminophen (TYLENOL) suppository 650 mg, 650 mg, Rectal, Q6H PRN, Jessica Montoya, APRN - CNP    allopurinol (ZYLOPRIM) tablet 100 mg, 100 mg, Oral, BID, Jessica Montoya, APRN - CNP, 100 mg at 03/16/22 0933    amLODIPine (NORVASC) tablet 5 mg, 5 mg, Oral, Daily, Jessica Montoya, APRN - CNP, 5 mg at 03/16/22 0934    [Held by provider] aspirin EC tablet 81 mg, 81 mg, Oral, Daily, Jessica Montoya, APRN - CNP, 81 mg at 03/16/22 0933    atorvastatin (LIPITOR) tablet 20 mg, 20 mg, Oral, Daily, Tiesha Grover, APRN - CNP, 20 mg at 03/16/22 0933    carvedilol (COREG) tablet 12.5 mg, 12.5 mg, Oral, BID WC, Tiesha Grover, APRN - CNP, 12.5 mg at 03/16/22 1731    vitamin B-12 (CYANOCOBALAMIN) tablet 1,000 mcg, 1,000 mcg, Oral, Daily, Tiesha Grover, APRN - CNP, 1,000 mcg at 03/16/22 2923    Vitamin D (CHOLECALCIFEROL) tablet 1,000 Units, 1,000 Units, Oral, Daily, Tiesha Grover, APRN - CNP, 1,000 Units at 03/16/22 0934    [Held by provider] trospium (SANCTURA) tablet 20 mg, 20 mg, Oral, Nightly, Tiesha Grover, APRN - CNP, 20 mg at 03/15/22 2127    loperamide (IMODIUM) capsule 2 mg, 2 mg, Oral, 4x Daily PRN, Tiesha Grover, APRN - CNP    magnesium oxide (MAG-OX) tablet 400 mg, 400 mg, Oral, BID, Tiesha Grover, APRN - CNP, 400 mg at 03/16/22 7091    isosorbide mononitrate (IMDUR) extended release tablet 30 mg, 30 mg, Oral, Daily, Tiesha Grover, APRN - CNP, 30 mg at 03/16/22 3141    spironolactone (ALDACTONE) tablet 25 mg, 25 mg, Oral, Daily, Tiesha Grover, APRN - CNP, 25 mg at 03/16/22 0933    sacubitril-valsartan (ENTRESTO) 24-26 MG per tablet 1 tablet, 1 tablet, Oral, BID, Tiesha Grover, APRN - CNP, 1 tablet at 03/16/22 1397          Assessment and Plan  Principal Problem:    Acute on chronic systolic heart failure, NYHA class 4 ([facility] [address]  Active Problems:    JENNA (acute kidney injury) ([facility] [address]    Chronic atrial fibrillation ([facility] [address]  Resolved Problems:    * No resolved hospital problems.  *        Acute systolic heart failure  -Improved with significant net negative fluid balance with IV diuresis, continue on oral Lasix  - TTE shows EF of 25-30%, no significant improvement from previous  -Cardiology recommendations noted-outpatient cardiology follow-up to arrange ICD placement    AMS, unclear etiology, possible hospital related delirium versus metabolic encephalopathy  Fall with head trauma, superficial hematoma  --Temporarily hold Xarelto and aspirin  CT head reviewed--no intracranial bleed or other acute findings aside from soft tissue swelling  Continue neurochecks with low threshold for repeat CT head for any further acute decline  Increase Synthroid due to uncontrolled hypothyroidism with inappropriately elevated TSH  Repeat UA and chest x-ray  Limit sedating meds  Further neurologic work-up including MRI if persists/worsens    JENNA on CKD, suspect cardiorenal syndrome  -Stable, monitor urine output and labs, avoid nephrotoxic agents and hypotension    Poor fluid intake, malnutrition  Add gentle fluids with low rate of D5 LR    A-fib  -Rate controlled on Coreg  --Xarelto held today due to head trauma    HTN  - home meds, monitor BP with further adjustment to regimen as warranted    hypothyroidism  -Inappropriately elevated TSH, increase Synthroid, outpatient follow-up with repeat TSH in 6 weeks    Debility  Recently at [facility]  PT/OT        Belen Kline MD 3/16/2022 6:07 PM

## 2022-03-17 ENCOUNTER — APPOINTMENT (OUTPATIENT)
Dept: MRI IMAGING | Age: 82
DRG: 291 | End: 2022-03-17
Payer: MEDICARE

## 2022-03-17 PROBLEM — B99.9 ENCEPHALOPATHY DUE TO INFECTION: Status: ACTIVE | Noted: 2022-03-17

## 2022-03-17 PROBLEM — G93.49 ENCEPHALOPATHY DUE TO INFECTION: Status: ACTIVE | Noted: 2022-03-17

## 2022-03-17 PROBLEM — N39.0 UTI (URINARY TRACT INFECTION): Status: ACTIVE | Noted: 2022-03-17

## 2022-03-17 LAB
ANION GAP SERPL CALCULATED.3IONS-SCNC: 12 MMOL/L (ref 7–19)
BACTERIA: ABNORMAL /HPF
BILIRUBIN URINE: NEGATIVE
BLOOD, URINE: ABNORMAL
BUN BLDV-MCNC: 42 MG/DL (ref 8–23)
CALCIUM SERPL-MCNC: 10 MG/DL (ref 8.8–10.2)
CHLORIDE BLD-SCNC: 101 MMOL/L (ref 98–111)
CLARITY: ABNORMAL
CO2: 24 MMOL/L (ref 22–29)
COLOR: YELLOW
CREAT SERPL-MCNC: 1.6 MG/DL (ref 0.5–0.9)
CRYSTALS, UA: ABNORMAL /HPF
EPITHELIAL CELLS, UA: 0 /HPF (ref 0–5)
GFR AFRICAN AMERICAN: 37
GFR NON-AFRICAN AMERICAN: 31
GLUCOSE BLD-MCNC: 110 MG/DL (ref 74–109)
GLUCOSE URINE: NEGATIVE MG/DL
HCT VFR BLD CALC: 31.7 % (ref 37–47)
HEMOGLOBIN: 9.6 G/DL (ref 12–16)
HYALINE CASTS: 0 /HPF (ref 0–8)
KETONES, URINE: NEGATIVE MG/DL
LEUKOCYTE ESTERASE, URINE: ABNORMAL
MAGNESIUM: 2.4 MG/DL (ref 1.6–2.4)
MCH RBC QN AUTO: 26.9 PG (ref 27–31)
MCHC RBC AUTO-ENTMCNC: 30.3 G/DL (ref 33–37)
MCV RBC AUTO: 88.8 FL (ref 81–99)
NITRITE, URINE: NEGATIVE
PDW BLD-RTO: 16.1 % (ref 11.5–14.5)
PH UA: 6.5 (ref 5–8)
PLATELET # BLD: 204 K/UL (ref 130–400)
PMV BLD AUTO: 10.8 FL (ref 9.4–12.3)
POTASSIUM SERPL-SCNC: 3.9 MMOL/L (ref 3.5–5)
PROTEIN UA: NEGATIVE MG/DL
RBC # BLD: 3.57 M/UL (ref 4.2–5.4)
RBC UA: 1 /HPF (ref 0–4)
SODIUM BLD-SCNC: 137 MMOL/L (ref 136–145)
SPECIFIC GRAVITY UA: 1.01 (ref 1–1.03)
UROBILINOGEN, URINE: 0.2 E.U./DL
WBC # BLD: 5.5 K/UL (ref 4.8–10.8)
WBC UA: 74 /HPF (ref 0–5)

## 2022-03-17 PROCEDURE — 81001 URINALYSIS AUTO W/SCOPE: CPT

## 2022-03-17 PROCEDURE — 85027 COMPLETE CBC AUTOMATED: CPT

## 2022-03-17 PROCEDURE — 6360000002 HC RX W HCPCS: Performed by: STUDENT IN AN ORGANIZED HEALTH CARE EDUCATION/TRAINING PROGRAM

## 2022-03-17 PROCEDURE — 6370000000 HC RX 637 (ALT 250 FOR IP): Performed by: STUDENT IN AN ORGANIZED HEALTH CARE EDUCATION/TRAINING PROGRAM

## 2022-03-17 PROCEDURE — 87186 SC STD MICRODIL/AGAR DIL: CPT

## 2022-03-17 PROCEDURE — 2580000003 HC RX 258

## 2022-03-17 PROCEDURE — 83735 ASSAY OF MAGNESIUM: CPT

## 2022-03-17 PROCEDURE — 6370000000 HC RX 637 (ALT 250 FOR IP): Performed by: INTERNAL MEDICINE

## 2022-03-17 PROCEDURE — 70551 MRI BRAIN STEM W/O DYE: CPT

## 2022-03-17 PROCEDURE — 36415 COLL VENOUS BLD VENIPUNCTURE: CPT

## 2022-03-17 PROCEDURE — 2580000003 HC RX 258: Performed by: STUDENT IN AN ORGANIZED HEALTH CARE EDUCATION/TRAINING PROGRAM

## 2022-03-17 PROCEDURE — 6370000000 HC RX 637 (ALT 250 FOR IP)

## 2022-03-17 PROCEDURE — 80048 BASIC METABOLIC PNL TOTAL CA: CPT

## 2022-03-17 PROCEDURE — 87086 URINE CULTURE/COLONY COUNT: CPT

## 2022-03-17 PROCEDURE — 2140000000 HC CCU INTERMEDIATE R&B

## 2022-03-17 RX ORDER — TRAMADOL HYDROCHLORIDE 50 MG/1
50 TABLET ORAL ONCE
Status: COMPLETED | OUTPATIENT
Start: 2022-03-17 | End: 2022-03-17

## 2022-03-17 RX ADMIN — AMLODIPINE BESYLATE 5 MG: 5 TABLET ORAL at 08:27

## 2022-03-17 RX ADMIN — CARVEDILOL 12.5 MG: 12.5 TABLET, FILM COATED ORAL at 17:14

## 2022-03-17 RX ADMIN — SODIUM CHLORIDE, SODIUM LACTATE, POTASSIUM CHLORIDE, CALCIUM CHLORIDE AND DEXTROSE MONOHYDRATE: 5; 600; 310; 30; 20 INJECTION, SOLUTION INTRAVENOUS at 19:29

## 2022-03-17 RX ADMIN — SODIUM CHLORIDE, PRESERVATIVE FREE 1000 MG: 5 INJECTION INTRAVENOUS at 17:55

## 2022-03-17 RX ADMIN — ACETAMINOPHEN 650 MG: 325 TABLET ORAL at 09:02

## 2022-03-17 RX ADMIN — Medication 400 MG: at 08:27

## 2022-03-17 RX ADMIN — LEVOTHYROXINE SODIUM 112 MCG: 112 TABLET ORAL at 06:05

## 2022-03-17 RX ADMIN — SACUBITRIL AND VALSARTAN 1 TABLET: 24; 26 TABLET, FILM COATED ORAL at 08:27

## 2022-03-17 RX ADMIN — SACUBITRIL AND VALSARTAN 1 TABLET: 24; 26 TABLET, FILM COATED ORAL at 19:50

## 2022-03-17 RX ADMIN — DOCUSATE SODIUM 100 MG: 100 CAPSULE, LIQUID FILLED ORAL at 08:26

## 2022-03-17 RX ADMIN — TRAMADOL HYDROCHLORIDE 50 MG: 50 TABLET, COATED ORAL at 15:04

## 2022-03-17 RX ADMIN — FUROSEMIDE 40 MG: 40 TABLET ORAL at 08:27

## 2022-03-17 RX ADMIN — CARVEDILOL 12.5 MG: 12.5 TABLET, FILM COATED ORAL at 08:27

## 2022-03-17 RX ADMIN — ALLOPURINOL 100 MG: 100 TABLET ORAL at 08:27

## 2022-03-17 RX ADMIN — ISOSORBIDE MONONITRATE 30 MG: 60 TABLET, EXTENDED RELEASE ORAL at 08:27

## 2022-03-17 RX ADMIN — CYANOCOBALAMIN TAB 500 MCG 1000 MCG: 500 TAB at 08:27

## 2022-03-17 RX ADMIN — ALLOPURINOL 100 MG: 100 TABLET ORAL at 19:50

## 2022-03-17 RX ADMIN — POLYETHYLENE GLYCOL 3350 17 G: 17 POWDER, FOR SOLUTION ORAL at 08:27

## 2022-03-17 RX ADMIN — Medication 1000 UNITS: at 08:27

## 2022-03-17 RX ADMIN — ATORVASTATIN CALCIUM 20 MG: 20 TABLET, FILM COATED ORAL at 08:27

## 2022-03-17 RX ADMIN — SPIRONOLACTONE 25 MG: 25 TABLET ORAL at 08:26

## 2022-03-17 RX ADMIN — SODIUM CHLORIDE, PRESERVATIVE FREE 10 ML: 5 INJECTION INTRAVENOUS at 08:27

## 2022-03-17 RX ADMIN — Medication 400 MG: at 19:50

## 2022-03-17 ASSESSMENT — PAIN DESCRIPTION - LOCATION
LOCATION: GENERALIZED
LOCATION: GENERALIZED

## 2022-03-17 ASSESSMENT — PAIN DESCRIPTION - ORIENTATION: ORIENTATION: OTHER (COMMENT)

## 2022-03-17 ASSESSMENT — PAIN DESCRIPTION - DESCRIPTORS
DESCRIPTORS: ACHING
DESCRIPTORS: ACHING

## 2022-03-17 ASSESSMENT — PAIN SCALES - GENERAL
PAINLEVEL_OUTOF10: 7
PAINLEVEL_OUTOF10: 3
PAINLEVEL_OUTOF10: 1

## 2022-03-17 ASSESSMENT — PAIN DESCRIPTION - PAIN TYPE
TYPE: CHRONIC PAIN
TYPE: ACUTE PAIN

## 2022-03-17 NOTE — PROGRESS NOTES
Physician Progress Note      PATIENT:               Randy Mazariegos  CSN #:                  [id number]  :                       1940  ADMIT DATE:       3/13/2022 3:10 PM  DISCH DATE:  RESPONDING  PROVIDER #:        Dick William          QUERY TEXT:    Patient admitted with CHF, and JENNA. Noted documentation of malnutrition on   your progress notes dated 22. In order to support the diagnosis of   malnutrition,  please include additional clinical indicators in your   documentation. Or please document if the diagnosis of Malnutrition has been   ruled out after further study. The medical record reflects the following:  Risk Factors: CHF, CKD, worsening mental status with a fall  Clinical Indicators: decreased appetite is documented, No supporting ASPN   Scale, no dietary consult,  Treatment: \"gentle fluids with low rate of D5LR\"    Thank you in advance,    Reymundo Styles RN-BSN, [facility]  Clinical   [facility],  [email or web address]  Options provided:  -- Malnutrition present as evidenced by, Please document evidence. -- Malnutrition was ruled out  -- Other - I will add my own diagnosis  -- Disagree - Not applicable / Not valid  -- Disagree - Clinically unable to determine / Unknown  -- Refer to Clinical Documentation Reviewer    PROVIDER RESPONSE TEXT:    Provider is clinically unable to determine a response to this query.     Query created by: Dav Panchal on 3/17/2022 3:20 PM      Electronically signed by:  Dick William 3/17/2022 5:12 PM

## 2022-03-17 NOTE — PROGRESS NOTES
Daily Progress Note    Date:3/17/2022  Patient: Nancy Mobley  : 1940  PBR:[id number]  CODE:DNR No additional code details  PCP:Delonte Ballard    Admit Date: 3/13/2022  3:10 PM   LOS: 4 days         Subjective:    Patient remains somnolent but arousable with mentation worse from baseline, however able to answer some questions appropriately. Denies any shortness of breath.         Review of Systems  Unable to complete comprehensive ROS due to AMS        Objective:      Vital signs in last 24 hours:  Patient Vitals for the past 24 hrs:   BP Temp Temp src Pulse Resp SpO2 Weight   22 1413 (!) 95/57 97.4 °F (36.3 °C) Temporal 61 18 96 % --   22 1003 (!) 97/49 97.3 °F (36.3 °C) Temporal 62 16 96 % --   22 0800 -- -- -- 80 -- -- --   22 0630 119/71 97.1 °F (36.2 °C) Temporal 79 18 95 % 163 lb 14.4 oz (74.3 kg)   22 2348 -- -- -- -- -- 94 % --   22 2347 119/64 98 °F (36.7 °C) Temporal 75 16 -- --   22 1825 123/87 97.3 °F (36.3 °C) Oral 76 16 95 % --     Physical exam    General: Elderly frail-appearing female, fatigued appearing, no distress  HEENT: two areas of soft tissue swelling/superficial hematoma on scalp, EOMI, pupils are reactive  Cardiovascular: Normal rate, regular rhythm, pulses 2+ and equal  Respiratory: Diminished breath sounds bilaterally without wheezes or rales  Abdomen: Soft, nontender, bowel sounds present  Neurologic: Somnolent but arousable, follows commands, mildly confused  Musculoskeletal: Loss of subcutaneous fat, appears malnourished  Extremities: No clubbing or cyanosis  Skin: Warm and dry        Lab Review   Recent Results (from the past 24 hour(s))   Basic Metabolic Panel    Collection Time: 22  2:44 AM   Result Value Ref Range    Sodium 137 136 - 145 mmol/L    Potassium 3.9 3.5 - 5.0 mmol/L    Chloride 101 98 - 111 mmol/L    CO2 24 22 - 29 mmol/L    Anion Gap 12 7 - 19 mmol/L    Glucose 110 (H) 74 - 109 mg/dL    BUN 42 (H) 8 - 23 mg/dL    CREATININE 1.6 (H) 0.5 - 0.9 mg/dL    GFR Non- 31 (A) >60    GFR  37 (L) >59    Calcium 10.0 8.8 - 10.2 mg/dL   CBC    Collection Time: 03/17/22  2:44 AM   Result Value Ref Range    WBC 5.5 4.8 - 10.8 K/uL    RBC 3.57 (L) 4.20 - 5.40 M/uL    Hemoglobin 9.6 (L) 12.0 - 16.0 g/dL    Hematocrit 31.7 (L) 37.0 - 47.0 %    MCV 88.8 81.0 - 99.0 fL    MCH 26.9 (L) 27.0 - 31.0 pg    MCHC 30.3 (L) 33.0 - 37.0 g/dL    RDW 16.1 (H) 11.5 - 14.5 %    Platelets 819 545 - 783 K/uL    MPV 10.8 9.4 - 12.3 fL   Magnesium    Collection Time: 03/17/22  2:44 AM   Result Value Ref Range    Magnesium 2.4 1.6 - 2.4 mg/dL   Urinalysis with Reflex to Culture    Collection Time: 03/17/22 10:36 AM    Specimen: Urine, straight catheter   Result Value Ref Range    Color, UA YELLOW Straw/Yellow    Clarity, UA CLOUDY (A) Clear    Glucose, Ur Negative Negative mg/dL    Bilirubin Urine Negative Negative    Ketones, Urine Negative Negative mg/dL    Specific Gravity, UA 1.010 1.005 - 1.030    Blood, Urine TRACE (A) Negative    pH, UA 6.5 5.0 - 8.0    Protein, UA Negative Negative mg/dL    Urobilinogen, Urine 0.2 <2.0 E.U./dL    Nitrite, Urine Negative Negative    Leukocyte Esterase, Urine LARGE (A) Negative   Microscopic Urinalysis    Collection Time: 03/17/22 10:36 AM   Result Value Ref Range    Bacteria, UA 1+ (A) None Seen /HPF    Crystals, UA NEG (A) None Seen /HPF    Hyaline Casts, UA 0 0 - 8 /HPF    WBC, UA 74 (H) 0 - 5 /HPF    RBC, UA 1 0 - 4 /HPF    Epithelial Cells, UA 0 0 - 5 /HPF       I/O last 3 completed shifts: In: 220 [P.O.:220]  Out: 1600 [Urine:1600]  I/O this shift:   In: 560 [P.O.:560]  Out: -       Current Facility-Administered Medications:     dextrose 5 % in lactated ringers infusion, , IntraVENous, Continuous, Mikayla Cintron MD, Last Rate: 50 mL/hr at 03/16/22 1538, New Bag at 03/16/22 1538    polyethylene glycol (GLYCOLAX) packet 17 g, 17 g, Oral, Daily, Mikayla Cintron MD, 17 g at 03/17/22 0827    docusate sodium (COLACE) capsule 100 mg, 100 mg, Oral, Daily, Swati Kwan MD, 100 mg at 03/17/22 7169    levothyroxine (SYNTHROID) tablet 112 mcg, 112 mcg, Oral, Daily, Swati Kwan MD, 112 mcg at 03/17/22 0605    furosemide (LASIX) tablet 40 mg, 40 mg, Oral, Daily, Fiordaliza Mao MD, 40 mg at 03/17/22 0827    [Held by provider] rivaroxaban (XARELTO) tablet 15 mg, 15 mg, Oral, Dinner, Fiordaliza Mao MD, 15 mg at 03/15/22 2127    sodium chloride flush 0.9 % injection 5-40 mL, 5-40 mL, IntraVENous, 2 times per day, GIAN Claudio - CNP, 10 mL at 03/17/22 0827    sodium chloride flush 0.9 % injection 5-40 mL, 5-40 mL, IntraVENous, PRN, Tiesha Grover APRN - CNP    0.9 % sodium chloride infusion, 25 mL, IntraVENous, PRN, Tiesha Grover APRN - CNP    ondansetron (ZOFRAN-ODT) disintegrating tablet 4 mg, 4 mg, Oral, Q8H PRN **OR** ondansetron (ZOFRAN) injection 4 mg, 4 mg, IntraVENous, Q6H PRN, Tiesha Grover APRN - CNP    acetaminophen (TYLENOL) tablet 650 mg, 650 mg, Oral, Q6H PRN, 650 mg at 03/17/22 0902 **OR** acetaminophen (TYLENOL) suppository 650 mg, 650 mg, Rectal, Q6H PRN, Tiesha Grover APRN - CNP    allopurinol (ZYLOPRIM) tablet 100 mg, 100 mg, Oral, BID, Tiesha Grover APRN - CNP, 100 mg at 03/17/22 0827    amLODIPine (NORVASC) tablet 5 mg, 5 mg, Oral, Daily, GIAN Claudio - CNP, 5 mg at 03/17/22 0827    [Held by provider] aspirin EC tablet 81 mg, 81 mg, Oral, Daily, Tiesha Grover APRN - CNP, 81 mg at 03/16/22 0933    atorvastatin (LIPITOR) tablet 20 mg, 20 mg, Oral, Daily, GIAN Claudio CNP, 20 mg at 03/17/22 0827    carvedilol (COREG) tablet 12.5 mg, 12.5 mg, Oral, BID WC, GIAN Claudio CNP, 12.5 mg at 03/17/22 1714    vitamin B-12 (CYANOCOBALAMIN) tablet 1,000 mcg, 1,000 mcg, Oral, Daily, GIAN Claudio CNP, 1,000 mcg at 03/17/22 0827    Vitamin D (CHOLECALCIFEROL) tablet 1,000 Units, 1,000 Units, Oral, Daily, Colette Srinivas, APRN - CNP, 1,000 Units at 03/17/22 0827    [Held by provider] [facility]) tablet 20 mg, 20 mg, Oral, Nightly, GIAN Pedroza - CNP, 20 mg at 03/15/22 2127    loperamide (IMODIUM) capsule 2 mg, 2 mg, Oral, 4x Daily PRN, GIAN Pedroza CNP    magnesium oxide (MAG-OX) tablet 400 mg, 400 mg, Oral, BID, GIAN Pedroza - CNP, 400 mg at 03/17/22 0827    isosorbide mononitrate (IMDUR) extended release tablet 30 mg, 30 mg, Oral, Daily, GIAN Pedroza - CNP, 30 mg at 03/17/22 0827    spironolactone (ALDACTONE) tablet 25 mg, 25 mg, Oral, Daily, GIAN Pedroza - CNP, 25 mg at 03/17/22 0826    sacubitril-valsartan (ENTRESTO) 24-26 MG per tablet 1 tablet, 1 tablet, Oral, BID, GIAN Pedroza - CNP, 1 tablet at 03/17/22 0827          Assessment and Plan  Principal Problem:    Acute on chronic systolic heart failure, NYHA class 4 ([address]  Active Problems:    JENNA (acute kidney injury) ([facility] [address]    Chronic atrial fibrillation ([facility] [address]  Resolved Problems:    * No resolved hospital problems. [facility] Course: Patient was admitted on 3/13 with worsening SOB and leg swelling. She was recently diagnosed with systolic CHF and was started on entresto on her previous admission with plans to repeat a TTE at the end of march for possible ICD placement. Admitted with acute systolic heart failure in setting of markedly elevated proBNP and JENNA suspected related to cardiorenal syndrome. She received diuresis with IV lasix and aldactone on this admission with significant net negative fluid balance, improvement. Evaluated by cardiology with recommendation to follow-up outpatient to arrange ICD placement. Repeat TTE inpatient revealed EF 25-30%. Patient subsequently transitioned to oral Lasix and will continue on Entresto, Aldactone, and Coreg.   Hospital course complicated after patient developed worsening mental status with confusion, then on 3/16 patient fell out of bed with resultant right elbow and head trauma. No evidence of fracture on plain films of elbow. CT head with no intracranial bleeding or other abnormality aside from mild scalp soft tissue swelling in right frontal and parietal region. No evidence of skull fracture. Noted to have inappropriate elevated TSH, so increased Synthroid. Obtained MRI brain on 3/17 which revealed chronic ischemic changes with atrophy but no acute findings otherwise. Due to encephalopathy, repeat UA which showed active urine sediment consistent with UTI and started on Rocephin.             Acute systolic heart failure  -Improved with significant net negative fluid balance with IV diuresis, continue on oral Lasix  --continue coreg, entresto, and aldactone  - TTE shows EF of 25-30%, no significant improvement from previous  -Cardiology recommendations noted-outpatient cardiology follow-up to arrange ICD placement  -Will need LifeVest on discharge    Acute encephalopathy, suspect infectious from UTI with possible hospital related delirium  -Treat UTI with Rocephin and follow urine culture  -MRI brain obtained today revealed chronic changes with some atrophy, otherwise no acute abnormalities  Continue neurochecks with low threshold for repeat CT head for any further acute decline    Fall with head trauma, superficial hematoma  --Temporarily hold Xarelto and aspirin  CT head reviewed--no intracranial bleed or other acute findings aside from soft tissue swelling    Continue Synthroid at increased dose due to uncontrolled hypothyroidism with inappropriately elevated TSH    JENNA on CKD, suspect cardiorenal syndrome  -Stable, monitor urine output and labs, avoid nephrotoxic agents and hypotension    Poor oral intake with AMS  Continue low rate IV fluid with D5 LR    A-fib  -Rate controlled on Coreg  --Xarelto temporarily held following head trauma    HTN  - Coreg, Entresto, Aldactone as above, hold amlodipine with lower BP, monitor with further adjustment to regimen as warranted    hypothyroidism  -Inappropriately elevated TSH, increase Synthroid, outpatient follow-up with repeat TSH in 6 weeks    Debility  [facility]  PT/OT    Considering SNF placement      Eli Thakur MD 3/17/2022 5:20 PM

## 2022-03-17 NOTE — CARE COORDINATION
Cher Ford reports waiting on life vest fitting to ensure pt's appropriate for services and capable of understanding and operating the DME. MRI completed today and awaiting results.

## 2022-03-17 NOTE — CARE COORDINATION
Spoke with pt's dtr who reports is considering private pay for SNF services as the pt has already used her 100 medicare days with [facility] previous to this admission. Dtr aware certain SNFs are not able to accept pt's with a life vest. Dtr is requesting referral to [address] [place] [address] [facility] who SW confirmed can accept life vest pts. CHERI made the referral and the Dtr reports is attempting medicaid coverage and will discuss furhter with the facility. [place] [facility] TO:[phone] QWO:[phone]    CHERI also discussed other [facility] agencies available in the [address] [facility] area as the pt was denied services pta through [employer] [place] after [employer] from [place] therapy services. CHERI provided the pt's Dtr with a list of available agencies in the [facility] area.

## 2022-03-17 NOTE — PROGRESS NOTES
Palliative care follow up visit. Pt is resting, awakens with moaning and facial grimacing on occasion. Dtr asking for ice pack for pt elbow, provided. Dtr tells me pt did wake up long enough for her to feed her lunch, otherwise pt has been sleeping on and off all day. Dtr is unsure of plan at dc at present time. Pt was at [facility] for her limited days for rehab. She was dc'd home with dtr. Family brought pt back to ED d/t SOA. SPoke with pt nurse, Aliza High, about pt s/s of pain. He will notify MD now that MRI has been completed and explore if there are other options for pain. Palliative following for support, GOC discussions.     Electronically signed by Kiara Marley RN on 3/17/2022 at 2:18 PM

## 2022-03-18 PROBLEM — B96.20 E. COLI UTI: Status: ACTIVE | Noted: 2022-03-17

## 2022-03-18 PROBLEM — N39.0 ENTEROCOCCUS UTI: Status: ACTIVE | Noted: 2022-03-18

## 2022-03-18 PROBLEM — B95.2 ENTEROCOCCUS UTI: Status: ACTIVE | Noted: 2022-03-18

## 2022-03-18 LAB
ANION GAP SERPL CALCULATED.3IONS-SCNC: 14 MMOL/L (ref 7–19)
BUN BLDV-MCNC: 45 MG/DL (ref 8–23)
CALCIUM SERPL-MCNC: 10.5 MG/DL (ref 8.8–10.2)
CHLORIDE BLD-SCNC: 100 MMOL/L (ref 98–111)
CO2: 26 MMOL/L (ref 22–29)
CREAT SERPL-MCNC: 1.7 MG/DL (ref 0.5–0.9)
GFR AFRICAN AMERICAN: 35
GFR NON-AFRICAN AMERICAN: 29
GLUCOSE BLD-MCNC: 120 MG/DL (ref 74–109)
HCT VFR BLD CALC: 32.6 % (ref 37–47)
HEMOGLOBIN: 10 G/DL (ref 12–16)
MAGNESIUM: 2.4 MG/DL (ref 1.6–2.4)
MCH RBC QN AUTO: 27.2 PG (ref 27–31)
MCHC RBC AUTO-ENTMCNC: 30.7 G/DL (ref 33–37)
MCV RBC AUTO: 88.8 FL (ref 81–99)
PDW BLD-RTO: 16.2 % (ref 11.5–14.5)
PLATELET # BLD: 227 K/UL (ref 130–400)
PMV BLD AUTO: 10.6 FL (ref 9.4–12.3)
POTASSIUM REFLEX MAGNESIUM: 4.3 MMOL/L (ref 3.5–5)
RBC # BLD: 3.67 M/UL (ref 4.2–5.4)
SODIUM BLD-SCNC: 140 MMOL/L (ref 136–145)
WBC # BLD: 5.6 K/UL (ref 4.8–10.8)

## 2022-03-18 PROCEDURE — 6370000000 HC RX 637 (ALT 250 FOR IP): Performed by: INTERNAL MEDICINE

## 2022-03-18 PROCEDURE — 97166 OT EVAL MOD COMPLEX 45 MIN: CPT

## 2022-03-18 PROCEDURE — 83735 ASSAY OF MAGNESIUM: CPT

## 2022-03-18 PROCEDURE — 80048 BASIC METABOLIC PNL TOTAL CA: CPT

## 2022-03-18 PROCEDURE — 85027 COMPLETE CBC AUTOMATED: CPT

## 2022-03-18 PROCEDURE — 2580000003 HC RX 258

## 2022-03-18 PROCEDURE — 6360000002 HC RX W HCPCS: Performed by: STUDENT IN AN ORGANIZED HEALTH CARE EDUCATION/TRAINING PROGRAM

## 2022-03-18 PROCEDURE — 2140000000 HC CCU INTERMEDIATE R&B

## 2022-03-18 PROCEDURE — 2580000003 HC RX 258: Performed by: STUDENT IN AN ORGANIZED HEALTH CARE EDUCATION/TRAINING PROGRAM

## 2022-03-18 PROCEDURE — 97530 THERAPEUTIC ACTIVITIES: CPT

## 2022-03-18 PROCEDURE — 6370000000 HC RX 637 (ALT 250 FOR IP): Performed by: STUDENT IN AN ORGANIZED HEALTH CARE EDUCATION/TRAINING PROGRAM

## 2022-03-18 PROCEDURE — 36415 COLL VENOUS BLD VENIPUNCTURE: CPT

## 2022-03-18 PROCEDURE — 6370000000 HC RX 637 (ALT 250 FOR IP)

## 2022-03-18 PROCEDURE — 97161 PT EVAL LOW COMPLEX 20 MIN: CPT

## 2022-03-18 RX ORDER — LINEZOLID 2 MG/ML
600 INJECTION, SOLUTION INTRAVENOUS EVERY 12 HOURS
Status: DISCONTINUED | OUTPATIENT
Start: 2022-03-18 | End: 2022-03-19

## 2022-03-18 RX ADMIN — ALLOPURINOL 100 MG: 100 TABLET ORAL at 09:52

## 2022-03-18 RX ADMIN — Medication 400 MG: at 09:51

## 2022-03-18 RX ADMIN — FUROSEMIDE 40 MG: 40 TABLET ORAL at 09:52

## 2022-03-18 RX ADMIN — SACUBITRIL AND VALSARTAN 1 TABLET: 24; 26 TABLET, FILM COATED ORAL at 09:48

## 2022-03-18 RX ADMIN — ATORVASTATIN CALCIUM 20 MG: 20 TABLET, FILM COATED ORAL at 09:51

## 2022-03-18 RX ADMIN — Medication 1000 UNITS: at 09:51

## 2022-03-18 RX ADMIN — ALLOPURINOL 100 MG: 100 TABLET ORAL at 20:34

## 2022-03-18 RX ADMIN — LINEZOLID 600 MG: 600 INJECTION, SOLUTION INTRAVENOUS at 17:43

## 2022-03-18 RX ADMIN — LEVOTHYROXINE SODIUM 112 MCG: 112 TABLET ORAL at 06:25

## 2022-03-18 RX ADMIN — SPIRONOLACTONE 25 MG: 25 TABLET ORAL at 09:53

## 2022-03-18 RX ADMIN — DOCUSATE SODIUM 100 MG: 100 CAPSULE, LIQUID FILLED ORAL at 09:53

## 2022-03-18 RX ADMIN — ISOSORBIDE MONONITRATE 30 MG: 60 TABLET, EXTENDED RELEASE ORAL at 09:45

## 2022-03-18 RX ADMIN — SODIUM CHLORIDE, PRESERVATIVE FREE 10 ML: 5 INJECTION INTRAVENOUS at 20:34

## 2022-03-18 RX ADMIN — SODIUM CHLORIDE, PRESERVATIVE FREE 1000 MG: 5 INJECTION INTRAVENOUS at 17:42

## 2022-03-18 RX ADMIN — Medication 400 MG: at 20:34

## 2022-03-18 RX ADMIN — CYANOCOBALAMIN TAB 500 MCG 1000 MCG: 500 TAB at 09:50

## 2022-03-18 RX ADMIN — POLYETHYLENE GLYCOL 3350 17 G: 17 POWDER, FOR SOLUTION ORAL at 09:54

## 2022-03-18 RX ADMIN — CARVEDILOL 12.5 MG: 12.5 TABLET, FILM COATED ORAL at 09:52

## 2022-03-18 ASSESSMENT — PAIN DESCRIPTION - PAIN TYPE
TYPE: ACUTE PAIN
TYPE: ACUTE PAIN

## 2022-03-18 ASSESSMENT — PAIN SCALES - WONG BAKER: WONGBAKER_NUMERICALRESPONSE: 6;4

## 2022-03-18 ASSESSMENT — PAIN SCALES - GENERAL: PAINLEVEL_OUTOF10: 6

## 2022-03-18 ASSESSMENT — PAIN DESCRIPTION - LOCATION
LOCATION: GENERALIZED
LOCATION: GENERALIZED

## 2022-03-18 ASSESSMENT — PAIN - FUNCTIONAL ASSESSMENT
PAIN_FUNCTIONAL_ASSESSMENT: PREVENTS OR INTERFERES WITH ALL ACTIVE AND SOME PASSIVE ACTIVITIES
PAIN_FUNCTIONAL_ASSESSMENT: PREVENTS OR INTERFERES SOME ACTIVE ACTIVITIES AND ADLS

## 2022-03-18 ASSESSMENT — PAIN DESCRIPTION - PROGRESSION: CLINICAL_PROGRESSION: NOT CHANGED

## 2022-03-18 ASSESSMENT — PAIN DESCRIPTION - DESCRIPTORS
DESCRIPTORS: SORE;ACHING
DESCRIPTORS: SORE;DISCOMFORT

## 2022-03-18 ASSESSMENT — PAIN DESCRIPTION - FREQUENCY: FREQUENCY: INTERMITTENT

## 2022-03-18 ASSESSMENT — PAIN DESCRIPTION - ONSET: ONSET: ON-GOING

## 2022-03-18 NOTE — PLAN OF CARE
Nutrition Problem #1: Altered nutrition-related lab values,Inadequate oral intake  Intervention: Food and/or Nutrient Delivery: Continue Current Diet  Nutritional Goals: Po intake >75%, reduced/improved edema

## 2022-03-18 NOTE — PROGRESS NOTES
Occupational Therapy Initial Assessment  Date: 3/18/2022   Patient Name: Teressa Means  MRN: [id number]     : 1940    Date of Service: 3/18/2022    Discharge Recommendations:  Patient would benefit from continued therapy after discharge       Assessment   Assessment: Evaluation completed and tx intiated. The patient will need further therapy to upgrade functional status. Treatment Diagnosis: Debility, heart failure, acute encephalopathy, fall with head trauma     REQUIRES OT FOLLOW UP: Yes  Activity Tolerance  Activity Tolerance: Treatment limited secondary to decreased cognition  Activity Tolerance: somnolent and requires alerting stim  Safety Devices  Safety Devices in place: Yes  Type of devices: Bed alarm in place;Call light within reach           Patient Diagnosis(es): The primary encounter diagnosis was Acute on chronic systolic congestive heart failure ([facility] [address] Diagnoses of JENNA (acute kidney injury) ([facility] [address] Other fatigue, Dyspnea and respiratory abnormalities, Chronic a-fib ([address] and Chronic anticoagulation were also pertinent to this visit. has a past medical history of Arthritis, Bladder infection, CAD (coronary artery disease), Cardiovascular disease, Chronic kidney disease, Colon polyps, Constipation, Gallbladder disease, GERD (gastroesophageal reflux disease), Gout, Heart attack ([address] Heart disease, Hyperlipidemia, Hypertension, Incontinence, and Thyroid disease. has a past surgical history that includes Cholecystectomy; Hysterectomy; and Coronary artery bypass graft.     Treatment Diagnosis: Debility, heart failure, acute encephalopathy, fall with head trauma      Restrictions  Restrictions/Precautions  Restrictions/Precautions: Fall Risk    Subjective   General  Chart Reviewed: Yes  Patient assessed for rehabilitation services?: Yes  Family / Caregiver Present: Yes (dtr)  Patient Currently in Pain: Yes  Pain Assessment  Pain Assessment: Faces  Pain Level: 6  Carrera-Baker Pain Rating: Hurts even more;Hurts little more  Pain Type: Acute pain  Pain Location: Generalized  Pain Descriptors: Sore;Discomfort  Pain Frequency: Intermittent  Pain Onset: On-going  Clinical Progression: Not changed  Functional Pain Assessment: Prevents or interferes some active activities and ADLs  Non-Pharmaceutical Pain Intervention(s): Repositioned;Elevation  Response to Pain Intervention: Patient Satisfied  Vital Signs  Temp: 96.2 °F (35.7 °C)  Temp Source: Temporal  Pulse: 54  Heart Rate Source: Monitor  Resp: 16  BP: 98/64  MAP (mmHg): 75  Patient Currently in Pain: Yes  Oxygen Therapy  SpO2: 95 %  O2 Device: None (Room air)    Social/Functional History  Social/Functional History  Lives With: Daughter  Home Equipment: Rolling walker  Ambulation Assistance: Needs assistance  Transfer Assistance: Needs assistance  Additional Comments: pt HAD BEEN AT SNF FOR REHAB AND THEN D/C TO DTR'S HOME RECENTLY       Objective              Balance  Sitting Balance: Contact guard assistance  Standing Balance: Maximum assistance (of two, legs hold some weight through them, but patient is limited by decreased initiation,pain, and somnolence)  ADL  Feeding: Maximum assistance  Grooming: Maximum assistance  UE Bathing: Maximum assistance  LE Bathing: Maximum assistance  UE Dressing: Maximum assistance  LE Dressing: Maximum assistance  Toileting: Maximum assistance        Bed mobility  Rolling to Left: Moderate assistance;Maximum assistance  Rolling to Right: Moderate assistance;Maximum assistance  Transfers  Stand Step Transfers:  (not appropriate to attempt this date)  Transfer Comments: Would benefit from trial on DEBI WOOTEN     Cognition  Cognition Comment: Makes simple verbal responses but difficult to hear her vocalize, requires physical prompt to initiate and sustain most movements                 LUE PROM (degrees)  LUE PROM: WFL  LUE AROM (degrees)  LUE General AROM: minimal noted  RUE PROM (degrees)  RUE PROM: WFL  RUE AROM (degrees)  RUE General AROM: minimal noted                    Plan   Plan  Times per week: 3-5    Goals  Short term goals  Short term goal 1: Complete simple grooming with min a  Short term goal 2: Perform rolling with min A  Short term goal 3: Stand with min A  Short term goal 4: The patient will consistently be awake/alert for ADL and mobility tasks     Tx initiated:  AAROM, cognitive stim, balance and mobility.  (15 mins)          Topher Salgado OT/L  Electronically signed by Topher Salgado OT/L on 3/18/2022 at 3:37 PM.

## 2022-03-18 NOTE — CARE COORDINATION
Pt is accepted at [facility] and can admit when determined medically stable for dc.      [facility] ZC:[phone] VDT:[phone]

## 2022-03-18 NOTE — PROGRESS NOTES
Comprehensive Nutrition Assessment    Type and Reason for Visit:  Reassess    Nutrition Recommendations/Plan: continue current iinterventions    Nutrition Assessment:  Pt's appetite has decreased and po intake is now ranging 0-50%. Is taking some of ONS. Still continues to have +1,+2 edema    Malnutrition Assessment:  Malnutrition Status: At risk for malnutrition (Comment)    Context:  Chronic Illness     Findings of the 6 clinical characteristics of malnutrition:  Energy Intake:  Mild decrease in energy intake (Comment)  Weight Loss:  No significant weight loss     Body Fat Loss:  No significant body fat loss     Muscle Mass Loss:  No significant muscle mass loss    Fluid Accumulation:  1 - Mild Generalized   Strength:  Not Performed    Estimated Daily Nutrient Needs:  Energy (kcal):  0486-2384 kcal/d (20-25 kcal/kg); Weight Used for Energy Requirements:  Current     Protein (g):   g/d (1.3-2 g/kg IBW); Weight Used for Protein Requirements:  Ideal        Fluid (ml/day):  5081-1016 ml/d; Method Used for Fluid Requirements:  1 ml/kcal      Nutrition Related Findings:  +1. +2 edema--generalized      Wounds:  Skin Tears       Current Nutrition Therapies:    ADULT ORAL NUTRITION SUPPLEMENT; Breakfast, Lunch, Dinner; Standard High Calorie/High Protein Oral Supplement  ADULT DIET; Regular; Low Sodium (2 gm)    Anthropometric Measures:  · Height: 5' 3\" (160 cm)  · Current Body Weight: 168 lb 11.2 oz (76.5 kg)   · Admission Body Weight: 169 lb (76.7 kg)    · Usual Body Weight: 155 lb (70.3 kg) (1/25/2022)     · Ideal Body Weight: 115 lbs; % Ideal Body Weight 146.7 %   · BMI: 29.9  · Adjusted Body Weight:  ; No Adjustment   · BMI Categories: Overweight (BMI 25.0-29. 9)       Nutrition Diagnosis:   · Altered nutrition-related lab values,Inadequate oral intake related to cardiac dysfunction,renal dysfunction (sleepy) as evidenced by lab values,localized or generalized fluid accumulation,intake 0-25%,intake 26-50%      Nutrition Interventions:   Food and/or Nutrient Delivery:  Continue Current Diet  Nutrition Education/Counseling:  Education completed   Coordination of Nutrition Care:  Continue to monitor while inpatient    Goals:  Po intake >75%, reduced/improved edema       Nutrition Monitoring and Evaluation:   Behavioral-Environmental Outcomes:  None Identified   Food/Nutrient Intake Outcomes:  Food and Nutrient Intake,Supplement Intake  Physical Signs/Symptoms Outcomes:  Biochemical Data,Weight,Skin,Nutrition Focused Physical Findings,Fluid Status or Edema     Discharge Planning:    Continue current diet     Electronically signed by Nely Gardiner, MS, RD, LD on 3/18/22 at 11:29 AM CDT    Contact: [phone]

## 2022-03-18 NOTE — PROGRESS NOTES
Daily Progress Note    Date:3/18/2022  Patient: Alanna Eller  : 1940  HF  CODE:DNR No additional code details  PCP:Delonte Ontiveros    Admit Date: 3/13/2022  3:10 PM   LOS: 5 days         Subjective:    Patient remains somnolent but arousable with mentation worse from baseline. No shortness of breath at rest.  No fevers or chills overnight.         Review of Systems  Unable to complete comprehensive ROS due to AMS        Objective:      Vital signs in last 24 hours:  Patient Vitals for the past 24 hrs:   BP Temp Temp src Pulse Resp SpO2 Height Weight   22 1420 98/64 96.2 °F (35.7 °C) Temporal 54 16 95 % -- --   22 1123 -- -- -- -- -- -- 5' 3\" (1.6 m) --   22 1045 123/69 96.8 °F (36 °C) Temporal 67 16 96 % -- --   22 0609 (!) 112/57 96.7 °F (35.9 °C) Temporal 63 18 95 % -- 168 lb 11.2 oz (76.5 kg)   22 0032 (!) 110/58 97 °F (36.1 °C) Temporal 61 18 95 % -- --   22 1731 110/64 96.9 °F (36.1 °C) Temporal 68 18 99 % -- --     Physical exam    General: Elderly frail-appearing female, fatigued appearing, no distress  HEENT: areas of soft tissue swelling/superficial hematoma on scalp  Cardiovascular: Normal rate, regular rhythm, pulses 2+ and equal  Respiratory: Diminished breath sounds bilaterally without wheezes or rales  Abdomen: Soft, nontender, bowel sounds present  Neurologic: Somnolent but arousable, able to follow some commands, confused  Musculoskeletal: Loss of subcutaneous fat, appears malnourished  Extremities: No clubbing or cyanosis  Skin: Warm and dry        Lab Review   Recent Results (from the past 24 hour(s))   CBC    Collection Time: 22  2:24 AM   Result Value Ref Range    WBC 5.6 4.8 - 10.8 K/uL    RBC 3.67 (L) 4.20 - 5.40 M/uL    Hemoglobin 10.0 (L) 12.0 - 16.0 g/dL    Hematocrit 32.6 (L) 37.0 - 47.0 %    MCV 88.8 81.0 - 99.0 fL    MCH 27.2 27.0 - 31.0 pg    MCHC 30.7 (L) 33.0 - 37.0 g/dL    RDW 16.2 (H) 11.5 - 14.5 %    Platelets 820 130 - 400 K/uL    MPV 10.6 9.4 - 12.3 fL   Magnesium    Collection Time: 03/18/22  2:24 AM   Result Value Ref Range    Magnesium 2.4 1.6 - 2.4 mg/dL   Basic Metabolic Panel w/ Reflex to MG    Collection Time: 03/18/22  2:24 AM   Result Value Ref Range    Sodium 140 136 - 145 mmol/L    Potassium reflex Magnesium 4.3 3.5 - 5.0 mmol/L    Chloride 100 98 - 111 mmol/L    CO2 26 22 - 29 mmol/L    Anion Gap 14 7 - 19 mmol/L    Glucose 120 (H) 74 - 109 mg/dL    BUN 45 (H) 8 - 23 mg/dL    CREATININE 1.7 (H) 0.5 - 0.9 mg/dL    GFR Non-African American 29 (A) >60    GFR  35 (L) >59    Calcium 10.5 (H) 8.8 - 10.2 mg/dL       I/O last 3 completed shifts:   In: 960 [P.O.:960]  Out: 1850 [Urine:1850]  I/O this shift:  In: 240 [P.O.:240]  Out: -       Current Facility-Administered Medications:     linezolid (ZYVOX) IVPB 600 mg, 600 mg, IntraVENous, Q12H, Tatyana Darden MD    cefTRIAXone (ROCEPHIN) 1,000 mg in sodium chloride (PF) 10 mL IV syringe, 1,000 mg, IntraVENous, Q24H, Tatyana Darden MD, 1,000 mg at 03/17/22 1755    dextrose 5 % in lactated ringers infusion, , IntraVENous, Continuous, Tatyana Darden MD, Last Rate: 25 mL/hr at 03/17/22 1929, New Bag at 03/17/22 1929    polyethylene glycol (GLYCOLAX) packet 17 g, 17 g, Oral, Daily, Tatyana Darden MD, 17 g at 03/18/22 0954    docusate sodium (COLACE) capsule 100 mg, 100 mg, Oral, Daily, Tatyana Darden MD, 100 mg at 03/18/22 5056    levothyroxine (SYNTHROID) tablet 112 mcg, 112 mcg, Oral, Daily, Tatyana Darden MD, 112 mcg at 03/18/22 0385    furosemide (LASIX) tablet 40 mg, 40 mg, Oral, Daily, Alan Cheung MD, 40 mg at 03/18/22 1702    rivaroxaban (XARELTO) tablet 15 mg, 15 mg, Oral, Dinner, Alan Cheung MD, 15 mg at 03/15/22 2127    sodium chloride flush 0.9 % injection 5-40 mL, 5-40 mL, IntraVENous, 2 times per day, Hali Jaimes, APRN - CNP, 10 mL at 03/17/22 0827    sodium chloride flush 0.9 % injection 5-40 mL, 5-40 mL, IntraVENous, PRN, Jyl Jolie, APRN - CNP    0.9 % sodium chloride infusion, 25 mL, IntraVENous, PRN, Jyl Jolie, APRN - CNP    ondansetron (ZOFRAN-ODT) disintegrating tablet 4 mg, 4 mg, Oral, Q8H PRN **OR** ondansetron (ZOFRAN) injection 4 mg, 4 mg, IntraVENous, Q6H PRN, Jyl Jolie, APRN - CNP    acetaminophen (TYLENOL) tablet 650 mg, 650 mg, Oral, Q6H PRN, 650 mg at 03/17/22 0902 **OR** acetaminophen (TYLENOL) suppository 650 mg, 650 mg, Rectal, Q6H PRN, Jyl Jolie, APRN - CNP    allopurinol (ZYLOPRIM) tablet 100 mg, 100 mg, Oral, BID, Jyl Jolie, APRN - CNP, 100 mg at 03/18/22 3974    [Held by provider] amLODIPine (NORVASC) tablet 5 mg, 5 mg, Oral, Daily, Jyl Jolie, APRN - CNP, 5 mg at 03/17/22 0827    [Held by provider] aspirin EC tablet 81 mg, 81 mg, Oral, Daily, Jyl Jolie, APRN - CNP, 81 mg at 03/16/22 0933    atorvastatin (LIPITOR) tablet 20 mg, 20 mg, Oral, Daily, Jyl Jolie, APRN - CNP, 20 mg at 03/18/22 0951    carvedilol (COREG) tablet 12.5 mg, 12.5 mg, Oral, BID , Diana Beltre MD, 12.5 mg at 03/18/22 2008    vitamin B-12 (CYANOCOBALAMIN) tablet 1,000 mcg, 1,000 mcg, Oral, Daily, Jyl Jolie, APRN - CNP, 1,000 mcg at 03/18/22 0950    Vitamin D (CHOLECALCIFEROL) tablet 1,000 Units, 1,000 Units, Oral, Daily, Jyl Jolie, APRN - CNP, 1,000 Units at 03/18/22 0951    [Held by provider] trospium (SANCTURA) tablet 20 mg, 20 mg, Oral, Nightly, Jyl Jolie, APRN - CNP, 20 mg at 03/15/22 2127    loperamide (IMODIUM) capsule 2 mg, 2 mg, Oral, 4x Daily PRN, GIAN Ma CNP    magnesium oxide (MAG-OX) tablet 400 mg, 400 mg, Oral, BID, GIAN Ma CNP, 400 mg at 03/18/22 3868    isosorbide mononitrate (IMDUR) extended release tablet 30 mg, 30 mg, Oral, Daily, GIAN Ma CNP, 30 mg at 03/18/22 0945    [Held by provider] spironolactone (ALDACTONE) tablet 25 mg, 25 mg, Oral, Daily, Diana Beltre MD, 25 mg at 03/18/22 13[facility] by provider] sacubitril-valsartan (ENTRESTO) 24-26 MG per tablet 1 tablet, 1 tablet, Oral, BID, Chuckie Mercer MD, 1 tablet at 03/18/22 0920          Assessment and Plan  Principal Problem:    Acute on chronic systolic heart failure, NYHA class 4 ([facility])  Active Problems:    E. coli UTI    Encephalopathy due to infection    Enterococcus UTI    Coronary artery disease involving native coronary artery of native heart without angina pectoris    Generalized weakness    Hypothyroidism    JENNA (acute kidney injury) ([facility] [address]    Chronic atrial fibrillation ([facility] [address]  Resolved Problems:    * No resolved hospital problems. [facility] Course: Patient was admitted on 3/13 with worsening SOB and leg swelling. She was recently diagnosed with systolic CHF and was started on entresto on her previous admission with plans to repeat a TTE at the end of march for possible ICD placement. Admitted with acute systolic heart failure in setting of markedly elevated proBNP and JENNA suspected related to cardiorenal syndrome. She received diuresis with IV lasix and aldactone on this admission with significant net negative fluid balance, improvement. Evaluated by cardiology with recommendation to follow-up outpatient to arrange ICD placement. Repeat TTE inpatient revealed EF 25-30%. Patient subsequently transitioned to oral Lasix and will continue on Entresto, Aldactone, and Coreg. Hospital course complicated after patient developed worsening mental status with confusion, then on 3/16 patient fell out of bed with resultant right elbow and head trauma. No evidence of fracture on plain films of elbow. CT head with no intracranial bleeding or other abnormality aside from mild scalp soft tissue swelling in right frontal and parietal region. No evidence of skull fracture. Given head trauma, Xarelto and aspirin were held. Noted to have inappropriate elevated TSH, so increased Synthroid.   Obtained MRI brain on 3/17 which revealed chronic ischemic changes with atrophy but no acute findings otherwise. Due to encephalopathy, repeat UA which showed active urine sediment consistent with UTI and started on Rocephin. Urine culture speciated E. coli and Enterococcus, subsequently added Zyvox pending final culture susceptibilities.             Acute systolic heart failure  -Improved with significant net negative fluid balance with IV diuresis, continue on oral Lasix  --continue coreg  --Temporarily held Entresto and Aldactone due to renal function and BP on lower side, will resume as appropriate  - TTE shows EF of 25-30%, no significant improvement from previous  -Cardiology recommendations noted-outpatient cardiology follow-up to arrange ICD placement  -Will need LifeVest on discharge    Acute encephalopathy, suspect infectious from UTI with possible hospital related delirium  -MRI brain revealed chronic changes with some atrophy, otherwise no acute abnormalities  Continue neurochecks with low threshold for repeat CT head for any further acute decline    UTI with E. coli and Enterococcus from urine culture  -Continue Rocephin and add Zyvox, further adjustment pending final urine culture susceptibilities    Fall with head trauma, superficial hematoma  --Continue to hold aspirin and Xarelto  CT head reviewed--no intracranial bleed or other acute findings aside from soft tissue swelling     Continue Synthroid at increased dose due to uncontrolled hypothyroidism with inappropriately elevated TSH     JENNA on CKD, suspect cardiorenal syndrome  -Stable, monitor urine output and labs, avoid nephrotoxic agents and hypotension    Poor oral intake with AMS  Continue low rate IV fluid with D5 LR    A-fib  -Rate controlled on Coreg  --Xarelto held following fall with head trauma, will plan to resume at some point    HTN  -Continue Coreg, will temporarily held Entresto and Aldactone due to BP on lower side but will resume as appropriate given heart

## 2022-03-18 NOTE — PROGRESS NOTES
Physical Therapy    Facility/Department: [facility] PROGRESSIVE CARE  Initial Assessment    NAME: Clovia Boeck  : 1940  MRN: [id number]    Date of Service: 3/18/2022    Discharge Recommendations:  Continue to assess pending progress,Patient would benefit from continued therapy after discharge        Assessment   Body structures, Functions, Activity limitations: Decreased functional mobility ; Decreased endurance; Increased pain;Decreased balance;Decreased strength;Decreased safe awareness;Decreased cognition;Decreased posture  Assessment: pt WOULD BENEFIT FROM SKILLED PT IN THIS SETTING TO PROGRESS HER MOBILITY AS WELL AS OVER ALL STRENGTH AND ENDURANCE  Prognosis: Fair  Decision Making: Low Complexity  PT Education: Goals;PT Role;Plan of Care;General Safety; Family Education  REQUIRES PT FOLLOW UP: Yes  Activity Tolerance  Activity Tolerance: Patient Tolerated treatment well       Patient Diagnosis(es): The primary encounter diagnosis was Acute on chronic systolic congestive heart failure ([address] Diagnoses of JENNA (acute kidney injury) ([address] Other fatigue, Dyspnea and respiratory abnormalities, Chronic a-fib ([address] and Chronic anticoagulation were also pertinent to this visit. has a past medical history of Arthritis, Bladder infection, CAD (coronary artery disease), Cardiovascular disease, Chronic kidney disease, Colon polyps, Constipation, Gallbladder disease, GERD (gastroesophageal reflux disease), Gout, Heart attack ([address] Heart disease, Hyperlipidemia, Hypertension, Incontinence, and Thyroid disease. has a past surgical history that includes Cholecystectomy; Hysterectomy; and Coronary artery bypass graft.     Restrictions  Restrictions/Precautions  Restrictions/Precautions: Fall Risk  Vision/Hearing        Subjective  General  Patient assessed for rehabilitation services?: Yes  Diagnosis: CHF, FALL OUT OF BED 3-16 WITH BRUISING TO HEAD AND R ELBOW  Follows Commands: Impaired  Subjective  Subjective: Pt NOTED TO \"MOAN\" WITH CERTAIN MOVEMENTS. DTR STATES SHE WANTS pt TO PARTICIPATE WITH PT/OT AND IS HOPING SHE WILL BE ABLE TO RECEIVE REHAB  D/C HOME AT SOME POINT. Pain Screening  Patient Currently in Pain: Yes  Pain Assessment  Pain Assessment: Faces  Carrera-Baker Pain Rating: Hurts even more;Hurts little more  Pain Type: Acute pain  Pain Location: Generalized  Pain Descriptors: Sore;Discomfort  Pain Frequency: Intermittent  Pain Onset: On-going  Clinical Progression: Not changed  Functional Pain Assessment: Prevents or interferes some active activities and ADLs  Non-Pharmaceutical Pain Intervention(s): Repositioned;Elevation  Response to Pain Intervention: Patient Satisfied  Vital Signs  Patient Currently in Pain: Yes  Pre Treatment Pain Screening  Intervention List: Patient able to continue with treatment    Orientation  Orientation  Overall Orientation Status: Within Functional Limits  Social/Functional History  Social/Functional History  Lives With: Daughter  Home Equipment: Rolling walker  Ambulation Assistance: Needs assistance  Transfer Assistance: Needs assistance  Additional Comments: pt HAD BEEN AT SNF FOR REHAB AND THEN D/C TO DTR'S HOME RECENTLY  Cognition        Objective          PROM RLE (degrees)  RLE PROM: WFL  PROM LLE (degrees)  LLE PROM: WFL  Strength Other  Other: VERY LITTLE ACTIVE MOVEMENT NOTED IN BILAT LE'S        Bed mobility  Rolling to Left: Moderate assistance;Maximum assistance  Rolling to Right: Moderate assistance;Maximum assistance  Supine to Sit: 2 Person assistance;Maximum assistance  Sit to Supine: Maximum assistance;2 Person assistance  Transfers  Sit to Stand: Maximum Assistance;2 Person Assistance  Comment: ATTEMPTED TO HAVE Pt STAND FROM EOB USING ASSIST OF BEDRAILS.  Pt RECEIVED MAX A OF TWO BUT WAS ONLY ABLE TO LIFT HER BUTTOCKS SLIGHTLY OFF THE BED        Balance  Sitting - Static: Fair;-        Plan   Plan  Times per week: 4-5  Plan weeks: 2  Current Treatment Recommendations: Strengthening,Balance Training,Functional Mobility Training,Transfer Training,Pain Management,Safety Education & Training,Gait Training,Patient/Caregiver Education & Training  Plan Comment: PROGRESSIVE MOBILITY AS TOLERATED.   Safety Devices  Type of devices: Call light within reach,Gait belt,Left in bed,Bed alarm in place       Goals  Short term goals  Time Frame for Short term goals: 2 WKS  Short term goal 1: ROLLS R/L, MIN A 1  Short term goal 2: SUP<>SIT, MIN A 1  Short term goal 3: SIT <>STAND, MIN.MOD A 1  Short term goal 4: TF'S WITH AD AS INDICATED, MOD A 1       Therapy Time   Individual Concurrent Group Co-treatment   Time In           Time Out           Minutes                   Jeison Hernández PT    Electronically signed by Jeison Hernández PT on 3/18/2022 at 3:52 PM

## 2022-03-19 PROBLEM — B96.29 UTI DUE TO EXTENDED-SPECTRUM BETA LACTAMASE (ESBL) PRODUCING ESCHERICHIA COLI: Status: ACTIVE | Noted: 2022-03-17

## 2022-03-19 PROBLEM — Z16.12 UTI DUE TO EXTENDED-SPECTRUM BETA LACTAMASE (ESBL) PRODUCING ESCHERICHIA COLI: Status: ACTIVE | Noted: 2022-03-17

## 2022-03-19 LAB
ANION GAP SERPL CALCULATED.3IONS-SCNC: 12 MMOL/L (ref 7–19)
BUN BLDV-MCNC: 57 MG/DL (ref 8–23)
CALCIUM SERPL-MCNC: 10.8 MG/DL (ref 8.8–10.2)
CHLORIDE BLD-SCNC: 100 MMOL/L (ref 98–111)
CO2: 26 MMOL/L (ref 22–29)
CREAT SERPL-MCNC: 1.7 MG/DL (ref 0.5–0.9)
GFR AFRICAN AMERICAN: 35
GFR NON-AFRICAN AMERICAN: 29
GLUCOSE BLD-MCNC: 122 MG/DL (ref 74–109)
HCT VFR BLD CALC: 31.4 % (ref 37–47)
HEMOGLOBIN: 9.5 G/DL (ref 12–16)
MCH RBC QN AUTO: 27.1 PG (ref 27–31)
MCHC RBC AUTO-ENTMCNC: 30.3 G/DL (ref 33–37)
MCV RBC AUTO: 89.7 FL (ref 81–99)
ORGANISM: ABNORMAL
ORGANISM: ABNORMAL
PDW BLD-RTO: 16.3 % (ref 11.5–14.5)
PLATELET # BLD: 219 K/UL (ref 130–400)
PMV BLD AUTO: 10.8 FL (ref 9.4–12.3)
POTASSIUM REFLEX MAGNESIUM: 4.8 MMOL/L (ref 3.5–5)
RBC # BLD: 3.5 M/UL (ref 4.2–5.4)
SODIUM BLD-SCNC: 138 MMOL/L (ref 136–145)
URINE CULTURE, ROUTINE: ABNORMAL
WBC # BLD: 6.3 K/UL (ref 4.8–10.8)

## 2022-03-19 PROCEDURE — 6370000000 HC RX 637 (ALT 250 FOR IP)

## 2022-03-19 PROCEDURE — 85027 COMPLETE CBC AUTOMATED: CPT

## 2022-03-19 PROCEDURE — 36415 COLL VENOUS BLD VENIPUNCTURE: CPT

## 2022-03-19 PROCEDURE — 6370000000 HC RX 637 (ALT 250 FOR IP): Performed by: STUDENT IN AN ORGANIZED HEALTH CARE EDUCATION/TRAINING PROGRAM

## 2022-03-19 PROCEDURE — 6370000000 HC RX 637 (ALT 250 FOR IP): Performed by: INTERNAL MEDICINE

## 2022-03-19 PROCEDURE — 2140000000 HC CCU INTERMEDIATE R&B

## 2022-03-19 PROCEDURE — 80048 BASIC METABOLIC PNL TOTAL CA: CPT

## 2022-03-19 PROCEDURE — 2580000003 HC RX 258: Performed by: STUDENT IN AN ORGANIZED HEALTH CARE EDUCATION/TRAINING PROGRAM

## 2022-03-19 PROCEDURE — 6360000002 HC RX W HCPCS: Performed by: STUDENT IN AN ORGANIZED HEALTH CARE EDUCATION/TRAINING PROGRAM

## 2022-03-19 PROCEDURE — 2580000003 HC RX 258

## 2022-03-19 RX ORDER — AMOXICILLIN 500 MG/1
500 CAPSULE ORAL EVERY 12 HOURS SCHEDULED
Status: DISCONTINUED | OUTPATIENT
Start: 2022-03-19 | End: 2022-03-19

## 2022-03-19 RX ORDER — MEROPENEM AND SODIUM CHLORIDE 500 MG/50ML
500 INJECTION, SOLUTION INTRAVENOUS EVERY 12 HOURS
Status: COMPLETED | OUTPATIENT
Start: 2022-03-19 | End: 2022-03-23

## 2022-03-19 RX ADMIN — SODIUM CHLORIDE, SODIUM LACTATE, POTASSIUM CHLORIDE, CALCIUM CHLORIDE AND DEXTROSE MONOHYDRATE: 5; 600; 310; 30; 20 INJECTION, SOLUTION INTRAVENOUS at 17:36

## 2022-03-19 RX ADMIN — MEROPENEM AND SODIUM CHLORIDE 500 MG: 500 INJECTION, SOLUTION INTRAVENOUS at 21:26

## 2022-03-19 RX ADMIN — POLYETHYLENE GLYCOL 3350 17 G: 17 POWDER, FOR SOLUTION ORAL at 08:34

## 2022-03-19 RX ADMIN — SODIUM CHLORIDE, PRESERVATIVE FREE 10 ML: 5 INJECTION INTRAVENOUS at 21:24

## 2022-03-19 RX ADMIN — Medication 1000 UNITS: at 08:34

## 2022-03-19 RX ADMIN — Medication 400 MG: at 08:34

## 2022-03-19 RX ADMIN — FUROSEMIDE 40 MG: 40 TABLET ORAL at 08:34

## 2022-03-19 RX ADMIN — DOCUSATE SODIUM 100 MG: 100 CAPSULE, LIQUID FILLED ORAL at 08:34

## 2022-03-19 RX ADMIN — CARVEDILOL 12.5 MG: 12.5 TABLET, FILM COATED ORAL at 17:36

## 2022-03-19 RX ADMIN — MEROPENEM AND SODIUM CHLORIDE 500 MG: 500 INJECTION, SOLUTION INTRAVENOUS at 12:15

## 2022-03-19 RX ADMIN — CYANOCOBALAMIN TAB 500 MCG 1000 MCG: 500 TAB at 08:34

## 2022-03-19 RX ADMIN — Medication 400 MG: at 21:24

## 2022-03-19 RX ADMIN — ALLOPURINOL 100 MG: 100 TABLET ORAL at 21:24

## 2022-03-19 RX ADMIN — ALLOPURINOL 100 MG: 100 TABLET ORAL at 08:34

## 2022-03-19 RX ADMIN — ATORVASTATIN CALCIUM 20 MG: 20 TABLET, FILM COATED ORAL at 08:34

## 2022-03-19 RX ADMIN — CARVEDILOL 12.5 MG: 12.5 TABLET, FILM COATED ORAL at 08:34

## 2022-03-19 RX ADMIN — LEVOTHYROXINE SODIUM 112 MCG: 112 TABLET ORAL at 06:22

## 2022-03-19 RX ADMIN — LINEZOLID 600 MG: 600 INJECTION, SOLUTION INTRAVENOUS at 06:21

## 2022-03-19 ASSESSMENT — PAIN SCALES - GENERAL: PAINLEVEL_OUTOF10: 0

## 2022-03-19 NOTE — PLAN OF CARE
Problem: Falls - Risk of:  Goal: Will remain free from falls  Description: Will remain free from falls  Outcome: Ongoing  Goal: Absence of physical injury  Description: Absence of physical injury  Outcome: Ongoing     Problem: OXYGENATION/RESPIRATORY FUNCTION  Goal: Patient will maintain patent airway  Outcome: Ongoing  Goal: Patient will achieve/maintain normal respiratory rate/effort  Description: Respiratory rate and effort will be within normal limits for the patient  Outcome: Ongoing     Problem: HEMODYNAMIC STATUS  Goal: Patient has stable vital signs and fluid balance  Outcome: Ongoing     Problem: ACTIVITY INTOLERANCE/IMPAIRED MOBILITY  Goal: Mobility/activity is maintained at optimum level for patient  Outcome: Ongoing     Problem: ACTIVITY INTOLERANCE/IMPAIRED MOBILITY  Goal: Mobility/activity is maintained at optimum level for patient  Outcome: Ongoing     Problem: Nutrition  Goal: Optimal nutrition therapy  3/18/2022 2334 by Elissa Block RN  Outcome: Ongoing  3/18/2022 1130 by Christofer Huggins, MS, RD, LD  Outcome: Ongoing     Problem: Pain:  Goal: Pain level will decrease  Description: Pain level will decrease  Outcome: Ongoing  Goal: Control of acute pain  Description: Control of acute pain  Outcome: Ongoing  Goal: Control of chronic pain  Description: Control of chronic pain  Outcome: Ongoing     Problem: Skin Integrity:  Goal: Will show no infection signs and symptoms  Description: Will show no infection signs and symptoms  Outcome: Ongoing  Goal: Absence of new skin breakdown  Description: Absence of new skin breakdown  Outcome: Ongoing

## 2022-03-19 NOTE — PROGRESS NOTES
Daily Progress Note    Date:3/19/2022  Patient: Amparo Hill  : 1940  JAW:[id number]  CODE:DNR No additional code details  PCP:Delonte Parmar    Admit Date: 3/13/2022  3:10 PM   LOS: 6 days         Subjective:    Patient is more alert and seems less confused this a.m., however continues to have some intermittent confusion. Denies any pain this a.m., no fevers or chills overnight, and no shortness of breath at rest.  Continues to have adequate urine output.         Review of Systems  Unable to complete comprehensive ROS due to AMS        Objective:      Vital signs in last 24 hours:  Patient Vitals for the past 24 hrs:   BP Temp Temp src Pulse Resp SpO2 Weight   22 1210 108/86 -- Temporal 61 14 97 % --   22 0624 (!) 105/58 97.5 °F (36.4 °C) Temporal 74 18 94 % 167 lb 3.2 oz (75.8 kg)   22 0036 (!) 99/56 97.8 °F (36.6 °C) Temporal 67 16 94 % --   22 1738 92/60 96.3 °F (35.7 °C) Temporal 55 18 92 % --     Physical exam    General: Elderly frail-appearing female, fatigued appearing, no distress  HEENT: areas of soft tissue swelling/superficial hematoma on scalp  Cardiovascular: Normal rate, regular rhythm, pulses 2+ and equal  Respiratory: Diminished breath sounds bilaterally without wheezes or rales  Abdomen: Soft, nontender, bowel sounds present  Neurologic: Somnolent but arousable, able to follow some commands, confused  Musculoskeletal: Loss of subcutaneous fat, appears malnourished  Extremities: No clubbing or cyanosis  Skin: Warm and dry        Lab Review   Recent Results (from the past 24 hour(s))   CBC    Collection Time: 22  2:52 AM   Result Value Ref Range    WBC 6.3 4.8 - 10.8 K/uL    RBC 3.50 (L) 4.20 - 5.40 M/uL    Hemoglobin 9.5 (L) 12.0 - 16.0 g/dL    Hematocrit 31.4 (L) 37.0 - 47.0 %    MCV 89.7 81.0 - 99.0 fL    MCH 27.1 27.0 - 31.0 pg    MCHC 30.3 (L) 33.0 - 37.0 g/dL    RDW 16.3 (H) 11.5 - 14.5 %    Platelets 315 481 - 972 K/uL    MPV 10.8 9.4 - 12.3 fL **OR** ondansetron (ZOFRAN) injection 4 mg, 4 mg, IntraVENous, Q6H PRN, Adelita Hood, APRN - CNP    acetaminophen (TYLENOL) tablet 650 mg, 650 mg, Oral, Q6H PRN, 650 mg at 03/17/22 0902 **OR** acetaminophen (TYLENOL) suppository 650 mg, 650 mg, Rectal, Q6H PRN, Adelita Hood, APRN - CNP    allopurinol (ZYLOPRIM) tablet 100 mg, 100 mg, Oral, BID, [employer], APRN - CNP, 100 mg at 03/19/22 0834    [Held by provider] amLODIPine (NORVASC) tablet 5 mg, 5 mg, Oral, Daily, Adelita Hood, APRN - CNP, 5 mg at 03/17/22 0827    [Held by provider] aspirin EC tablet 81 mg, 81 mg, Oral, Daily, Adelita Hood, APRN - CNP, 81 mg at 03/16/22 0933    atorvastatin (LIPITOR) tablet 20 mg, 20 mg, Oral, Daily, Adelita Hood, APRN - CNP, 20 mg at 03/19/22 0834    carvedilol (COREG) tablet 12.5 mg, 12.5 mg, Oral, BID WC, Henri Villa MD, 12.5 mg at 03/19/22 2883    vitamin B-12 (CYANOCOBALAMIN) tablet 1,000 mcg, 1,000 mcg, Oral, Daily, Adelita Hood, APRN - CNP, 1,000 mcg at 03/19/22 7886    Vitamin D (CHOLECALCIFEROL) tablet 1,000 Units, 1,000 Units, Oral, Daily, Adelita Hood, APRN - CNP, 1,000 Units at 03/19/22 0834    [Held by provider] trospium (SANCTURA) tablet 20 mg, 20 mg, Oral, Nightly, Adelita Hood, APRN - CNP, 20 mg at 03/15/22 2127    loperamide (IMODIUM) capsule 2 mg, 2 mg, Oral, 4x Daily PRN, Adelita Hood, APRN - CNP    magnesium oxide (MAG-OX) tablet 400 mg, 400 mg, Oral, BID, [employer], GIAN - CNP, 400 mg at 03/19/22 8930    [Held by provider] isosorbide mononitrate (IMDUR) extended release tablet 30 mg, 30 mg, Oral, Daily, Adelita Hood, APRN - CNP, 30 mg at 03/18/22 0945    [Held by provider] spironolactone (ALDACTONE) tablet 25 mg, 25 mg, Oral, Daily, Henri Villa MD, 25 mg at 03/18/22 0953    [Held by provider] sacubitril-valsartan (ENTRESTO) 24-26 MG per tablet 1 tablet, 1 tablet, Oral, BID, Henri Villa MD, 1 tablet at 03/18/22 0948          Assessment and Plan  Principal Problem:    Acute on chronic systolic heart failure, NYHA class 4 ([facility])  Active Problems:    UTI due to extended-spectrum beta lactamase (ESBL) producing Escherichia coli    Encephalopathy due to infection    Enterococcus UTI    Coronary artery disease involving native coronary artery of native heart without angina pectoris    Generalized weakness    Hypothyroidism    JENNA (acute kidney injury) ([facility] [address]    Chronic atrial fibrillation ([facility] [address]  Resolved Problems:    * No resolved hospital problems. [facility] Course: Patient was admitted on 3/13 with worsening SOB and leg swelling. She was recently diagnosed with systolic CHF and was started on entresto on her previous admission with plans to repeat a TTE at the end of march for possible ICD placement. Admitted with acute systolic heart failure in setting of markedly elevated proBNP and JENNA suspected related to cardiorenal syndrome. She received diuresis with IV lasix and aldactone on this admission with significant net negative fluid balance, improvement. Evaluated by cardiology with recommendation to follow-up outpatient to arrange ICD placement. Repeat TTE inpatient revealed EF 25-30%. Patient subsequently transitioned to oral Lasix and will continue on Entresto, Aldactone, and Coreg. Hospital course complicated after patient developed worsening mental status with confusion, then on 3/16 patient fell out of bed with resultant right elbow and head trauma. No evidence of fracture on plain films of elbow. CT head with no intracranial bleeding or other abnormality aside from mild scalp soft tissue swelling in right frontal and parietal region. No evidence of skull fracture. Given head trauma, Xarelto and aspirin were held. Noted to have inappropriate elevated TSH, so increased Synthroid. Obtained MRI brain on 3/17 which revealed chronic ischemic changes with atrophy but no acute findings otherwise.   Due to encephalopathy, repeat UA which showed active urine sediment consistent with UTI. Urine culture speciated ESBL E. coli and Enterococcus, with susceptibilities reviewed, antibiotics switched to meropenem.             Acute systolic heart failure  -Improved with significant net negative fluid balance with IV diuresis, continue on oral Lasix  --continue coreg  --Temporarily held Entresto and Aldactone due to renal function and BP on lower side, will resume as appropriate  - TTE shows EF of 25-30%, no significant improvement from previous  -Cardiology recommendations noted-outpatient cardiology follow-up to arrange ICD placement  -Will need LifeVest on discharge    Acute encephalopathy, suspect infectious from UTI with possible hospital related delirium  -MRI brain revealed chronic changes with some atrophy, otherwise no acute abnormalities  Continue neurochecks with low threshold for repeat CT head for any further acute decline    UTI with ESBL E. coli and Enterococcus from urine culture  -Urine culture susceptibilities reviewed  -Treat with meropenem    Fall with head trauma, superficial hematoma  --Continue to hold aspirin and Xarelto    Continue Synthroid at increased dose due to uncontrolled hypothyroidism with inappropriately elevated TSH     JENNA on CKD, suspect cardiorenal syndrome  -Stable, monitor urine output and labs, avoid nephrotoxic agents and hypotension    Poor oral intake with AMS  Continue low rate IV fluid     A-fib  -Rate controlled on Coreg  --Xarelto held following fall with head trauma    HTN  -Continue Coreg, will temporarily held Entresto and Aldactone due to BP on lower side but will resume as appropriate given heart failure    hypothyroidism  -Inappropriately elevated TSH, continue Synthroid at increased dose, outpatient follow-up with repeat TSH in 6 weeks    Debility  Recently at [facility]  PT/OT      Awaiting rehab placement, possibly early next week pending clinical improvement        Pietro Galloway MD 3/19/2022 3:14 PM

## 2022-03-20 ENCOUNTER — APPOINTMENT (OUTPATIENT)
Dept: CT IMAGING | Age: 82
DRG: 291 | End: 2022-03-20
Payer: MEDICARE

## 2022-03-20 PROBLEM — R25.2 SPASMS OF THE HANDS OR FEET: Status: ACTIVE | Noted: 2022-03-20

## 2022-03-20 PROBLEM — E83.52 HYPERCALCEMIA: Status: ACTIVE | Noted: 2022-03-20

## 2022-03-20 LAB
AMMONIA: 18 UMOL/L (ref 11–51)
ANION GAP SERPL CALCULATED.3IONS-SCNC: 13 MMOL/L (ref 7–19)
BUN BLDV-MCNC: 54 MG/DL (ref 8–23)
CALCIUM SERPL-MCNC: 11.1 MG/DL (ref 8.8–10.2)
CHLORIDE BLD-SCNC: 99 MMOL/L (ref 98–111)
CO2: 25 MMOL/L (ref 22–29)
CREAT SERPL-MCNC: 1.6 MG/DL (ref 0.5–0.9)
GFR AFRICAN AMERICAN: 37
GFR NON-AFRICAN AMERICAN: 31
GLUCOSE BLD-MCNC: 100 MG/DL (ref 74–109)
HCT VFR BLD CALC: 33 % (ref 37–47)
HEMOGLOBIN: 10.1 G/DL (ref 12–16)
MCH RBC QN AUTO: 27.3 PG (ref 27–31)
MCHC RBC AUTO-ENTMCNC: 30.6 G/DL (ref 33–37)
MCV RBC AUTO: 89.2 FL (ref 81–99)
PARATHYROID HORMONE INTACT: 37.3 PG/ML (ref 15–65)
PDW BLD-RTO: 16.3 % (ref 11.5–14.5)
PHOSPHORUS: 3.4 MG/DL (ref 2.5–4.5)
PLATELET # BLD: 234 K/UL (ref 130–400)
PMV BLD AUTO: 11.1 FL (ref 9.4–12.3)
POTASSIUM REFLEX MAGNESIUM: 4.6 MMOL/L (ref 3.5–5)
RBC # BLD: 3.7 M/UL (ref 4.2–5.4)
SODIUM BLD-SCNC: 137 MMOL/L (ref 136–145)
VITAMIN D 25-HYDROXY: 39.5 NG/ML
WBC # BLD: 5.9 K/UL (ref 4.8–10.8)

## 2022-03-20 PROCEDURE — 85027 COMPLETE CBC AUTOMATED: CPT

## 2022-03-20 PROCEDURE — 36415 COLL VENOUS BLD VENIPUNCTURE: CPT

## 2022-03-20 PROCEDURE — 82306 VITAMIN D 25 HYDROXY: CPT

## 2022-03-20 PROCEDURE — 2580000003 HC RX 258

## 2022-03-20 PROCEDURE — 84100 ASSAY OF PHOSPHORUS: CPT

## 2022-03-20 PROCEDURE — 6370000000 HC RX 637 (ALT 250 FOR IP)

## 2022-03-20 PROCEDURE — 95816 EEG AWAKE AND DROWSY: CPT

## 2022-03-20 PROCEDURE — 82140 ASSAY OF AMMONIA: CPT

## 2022-03-20 PROCEDURE — 6370000000 HC RX 637 (ALT 250 FOR IP): Performed by: STUDENT IN AN ORGANIZED HEALTH CARE EDUCATION/TRAINING PROGRAM

## 2022-03-20 PROCEDURE — 80048 BASIC METABOLIC PNL TOTAL CA: CPT

## 2022-03-20 PROCEDURE — 95816 EEG AWAKE AND DROWSY: CPT | Performed by: PSYCHIATRY & NEUROLOGY

## 2022-03-20 PROCEDURE — 82542 COL CHROMOTOGRAPHY QUAL/QUAN: CPT

## 2022-03-20 PROCEDURE — 6360000002 HC RX W HCPCS: Performed by: STUDENT IN AN ORGANIZED HEALTH CARE EDUCATION/TRAINING PROGRAM

## 2022-03-20 PROCEDURE — 83970 ASSAY OF PARATHORMONE: CPT

## 2022-03-20 PROCEDURE — 2140000000 HC CCU INTERMEDIATE R&B

## 2022-03-20 PROCEDURE — 99223 1ST HOSP IP/OBS HIGH 75: CPT | Performed by: PSYCHIATRY & NEUROLOGY

## 2022-03-20 PROCEDURE — 70450 CT HEAD/BRAIN W/O DYE: CPT

## 2022-03-20 PROCEDURE — 6370000000 HC RX 637 (ALT 250 FOR IP): Performed by: INTERNAL MEDICINE

## 2022-03-20 RX ORDER — GAUZE BANDAGE 2" X 2"
100 BANDAGE TOPICAL DAILY
Status: DISCONTINUED | OUTPATIENT
Start: 2022-03-21 | End: 2022-03-21

## 2022-03-20 RX ORDER — AMOXICILLIN 500 MG/1
500 CAPSULE ORAL EVERY 12 HOURS
Status: DISCONTINUED | OUTPATIENT
Start: 2022-03-20 | End: 2022-03-21

## 2022-03-20 RX ORDER — THIAMINE HYDROCHLORIDE 100 MG/ML
200 INJECTION, SOLUTION INTRAMUSCULAR; INTRAVENOUS ONCE
Status: COMPLETED | OUTPATIENT
Start: 2022-03-20 | End: 2022-03-20

## 2022-03-20 RX ORDER — CALCITONIN SALMON 200 [USP'U]/ML
0.4 INJECTION, SOLUTION INTRAMUSCULAR; SUBCUTANEOUS ONCE
Status: DISCONTINUED | OUTPATIENT
Start: 2022-03-20 | End: 2022-03-20

## 2022-03-20 RX ORDER — CALCITONIN SALMON 200 [USP'U]/ML
4 INJECTION, SOLUTION INTRAMUSCULAR; SUBCUTANEOUS ONCE
Status: COMPLETED | OUTPATIENT
Start: 2022-03-20 | End: 2022-03-20

## 2022-03-20 RX ADMIN — POLYETHYLENE GLYCOL 3350 17 G: 17 POWDER, FOR SOLUTION ORAL at 11:07

## 2022-03-20 RX ADMIN — MEROPENEM AND SODIUM CHLORIDE 500 MG: 500 INJECTION, SOLUTION INTRAVENOUS at 21:32

## 2022-03-20 RX ADMIN — ATORVASTATIN CALCIUM 20 MG: 20 TABLET, FILM COATED ORAL at 11:08

## 2022-03-20 RX ADMIN — DOCUSATE SODIUM 100 MG: 100 CAPSULE, LIQUID FILLED ORAL at 11:08

## 2022-03-20 RX ADMIN — FUROSEMIDE 40 MG: 40 TABLET ORAL at 11:08

## 2022-03-20 RX ADMIN — AMOXICILLIN 500 MG: 500 CAPSULE ORAL at 17:52

## 2022-03-20 RX ADMIN — Medication 400 MG: at 11:08

## 2022-03-20 RX ADMIN — ACETAMINOPHEN 650 MG: 325 TABLET ORAL at 11:41

## 2022-03-20 RX ADMIN — LEVOTHYROXINE SODIUM 112 MCG: 112 TABLET ORAL at 08:18

## 2022-03-20 RX ADMIN — CALCITONIN SALMON 304 UNITS: 200 INJECTION, SOLUTION INTRAMUSCULAR; SUBCUTANEOUS at 13:22

## 2022-03-20 RX ADMIN — CARVEDILOL 12.5 MG: 12.5 TABLET, FILM COATED ORAL at 11:08

## 2022-03-20 RX ADMIN — THIAMINE HYDROCHLORIDE 200 MG: 100 INJECTION, SOLUTION INTRAMUSCULAR; INTRAVENOUS at 11:23

## 2022-03-20 RX ADMIN — ALLOPURINOL 100 MG: 100 TABLET ORAL at 11:09

## 2022-03-20 RX ADMIN — Medication 1000 UNITS: at 11:08

## 2022-03-20 RX ADMIN — SODIUM CHLORIDE, PRESERVATIVE FREE 10 ML: 5 INJECTION INTRAVENOUS at 22:44

## 2022-03-20 RX ADMIN — CYANOCOBALAMIN TAB 500 MCG 1000 MCG: 500 TAB at 11:09

## 2022-03-20 RX ADMIN — MEROPENEM AND SODIUM CHLORIDE 500 MG: 500 INJECTION, SOLUTION INTRAVENOUS at 11:06

## 2022-03-20 RX ADMIN — SODIUM CHLORIDE, PRESERVATIVE FREE 10 ML: 5 INJECTION INTRAVENOUS at 11:08

## 2022-03-20 ASSESSMENT — PAIN SCALES - WONG BAKER
WONGBAKER_NUMERICALRESPONSE: 2
WONGBAKER_NUMERICALRESPONSE: 6

## 2022-03-20 ASSESSMENT — PAIN DESCRIPTION - LOCATION
LOCATION: LEG
LOCATION: LEG

## 2022-03-20 ASSESSMENT — PAIN SCALES - GENERAL
PAINLEVEL_OUTOF10: 2
PAINLEVEL_OUTOF10: 6

## 2022-03-20 ASSESSMENT — PAIN DESCRIPTION - ORIENTATION
ORIENTATION: RIGHT
ORIENTATION: RIGHT

## 2022-03-20 NOTE — PROGRESS NOTES
Daily Progress Note    Date:3/20/2022  Patient: Ebony Saleh  : 1940  [id number]  CODE:DNR No additional code details  PCP:Delonte Paiz    Admit Date: 3/13/2022  3:10 PM   LOS: 7 days         Subjective:    Patient remains confused and disoriented, although no severe agitation. No fevers overnight. Patient reportedly having some occasional myoclonic movement.           Review of Systems  Unable to complete comprehensive ROS due to AMS        Objective:      Vital signs in last 24 hours:  Patient Vitals for the past 24 hrs:   BP Temp Temp src Pulse Resp SpO2 Weight   22 1100 106/74 97.2 °F (36.2 °C) Temporal 64 12 95 % --   22 0632 121/62 97 °F (36.1 °C) Temporal 70 16 94 % 167 lb 1.6 oz (75.8 kg)   22 0015 126/62 98 °F (36.7 °C) Temporal 64 16 98 % --   22 -- -- -- 64 -- -- --   222 132/65 -- Temporal 70 15 97 % --     Physical exam    General: Elderly frail-appearing female, fatigued appearing, no distress  HEENT: areas of soft tissue swelling/superficial hematoma on scalp  Cardiovascular: Normal rate, regular rhythm, pulses 2+ and equal  Respiratory: Diminished breath sounds bilaterally without wheezes or rales  Abdomen: Soft, nontender, bowel sounds present  Neurologic: Confused, disoriented but no agitation  Musculoskeletal: Loss of subcutaneous fat, appears malnourished  Extremities: No clubbing or cyanosis  Skin: Warm and dry        Lab Review   Recent Results (from the past 24 hour(s))   CBC    Collection Time: 22  3:02 AM   Result Value Ref Range    WBC 5.9 4.8 - 10.8 K/uL    RBC 3.70 (L) 4.20 - 5.40 M/uL    Hemoglobin 10.1 (L) 12.0 - 16.0 g/dL    Hematocrit 33.0 (L) 37.0 - 47.0 %    MCV 89.2 81.0 - 99.0 fL    MCH 27.3 27.0 - 31.0 pg    MCHC 30.6 (L) 33.0 - 37.0 g/dL    RDW 16.3 (H) 11.5 - 14.5 %    Platelets 768 954 - 366 K/uL    MPV 11.1 9.4 - 12.3 fL   Basic Metabolic Panel w/ Reflex to MG    Collection Time: 22  3:02 AM   Result Value Ref Range    Sodium 137 136 - 145 mmol/L    Potassium reflex Magnesium 4.6 3.5 - 5.0 mmol/L    Chloride 99 98 - 111 mmol/L    CO2 25 22 - 29 mmol/L    Anion Gap 13 7 - 19 mmol/L    Glucose 100 74 - 109 mg/dL    BUN 54 (H) 8 - 23 mg/dL    CREATININE 1.6 (H) 0.5 - 0.9 mg/dL    GFR Non- 31 (A) >60    GFR  37 (L) >59    Calcium 11.1 (H) 8.8 - 10.2 mg/dL   Ammonia    Collection Time: 03/20/22  9:36 AM   Result Value Ref Range    Ammonia 18 11 - 51 umol/L   PTH, Intact    Collection Time: 03/20/22  9:36 AM   Result Value Ref Range    PTH 37.3 15.0 - 65.0 pg/mL   Phosphorus    Collection Time: 03/20/22  9:36 AM   Result Value Ref Range    Phosphorus 3.4 2.5 - 4.5 mg/dL   Vitamin D 25 Hydroxy    Collection Time: 03/20/22  9:36 AM   Result Value Ref Range    Vit D, 25-Hydroxy 39.5 >=30 ng/mL       I/O last 3 completed shifts: In: 1310 [P.O.:1000;  I.V.:310]  Out: 2800 [Urine:2800]  I/O this shift:  In: 240 [P.O.:240]  Out: -       Current Facility-Administered Medications:     magic butt cream, , Topical, Q4H PRN, MD Opal Min  Leo Esquivel ON 3/21/2022] thiamine mononitrate tablet 100 mg, 100 mg, Oral, Daily, Hi Reyes MD    amoxicillin (AMOXIL) capsule 500 mg, 500 mg, Oral, Q12H, Hi Reyes MD    meropenem (MERREM) 500 mg in sodium chloride 0.9% 50 mL (duplex), 500 mg, IntraVENous, Q12H, Hi Reyes MD, Stopped at 03/20/22 1229    dextrose 5 % in lactated ringers infusion, , IntraVENous, Continuous, Hi Reyes MD, Last Rate: 75 mL/hr at 03/20/22 1109, Rate Change at 03/20/22 1109    polyethylene glycol (GLYCOLAX) packet 17 g, 17 g, Oral, Daily, Hi Reyes MD, 17 g at 03/20/22 1107    docusate sodium (COLACE) capsule 100 mg, 100 mg, Oral, Daily, Hi Reyes MD, 100 mg at 03/20/22 1108    levothyroxine (SYNTHROID) tablet 112 mcg, 112 mcg, Oral, Daily, Hi Reyes MD, 112 mcg at 03/20/22 0818    furosemide (LASIX) tablet 40 mg, 40 mg, Oral, Daily, Marguerite Simpson MD, 40 mg at 03/20/22 1108    [Held by provider] rivaroxaban (XARELTO) tablet 15 mg, 15 mg, Oral, Dinner, Marguerite Simpson MD, 15 mg at 03/15/22 2127    sodium chloride flush 0.9 % injection 5-40 mL, 5-40 mL, IntraVENous, 2 times per day, Bakari Ryan, APRN - CNP, 10 mL at 03/20/22 1108    sodium chloride flush 0.9 % injection 5-40 mL, 5-40 mL, IntraVENous, PRN, Bakari Ryan, APRN - CNP    0.9 % sodium chloride infusion, 25 mL, IntraVENous, PRN, Bakari Ryan, APRN - CNP    ondansetron (ZOFRAN-ODT) disintegrating tablet 4 mg, 4 mg, Oral, Q8H PRN **OR** ondansetron (ZOFRAN) injection 4 mg, 4 mg, IntraVENous, Q6H PRN, Bakari Ryan, APRN - CNP    acetaminophen (TYLENOL) tablet 650 mg, 650 mg, Oral, Q6H PRN, 650 mg at 03/20/22 1141 **OR** acetaminophen (TYLENOL) suppository 650 mg, 650 mg, Rectal, Q6H PRN, Bakari Ryan, APRN - CNP    allopurinol (ZYLOPRIM) tablet 100 mg, 100 mg, Oral, BID, Bakari Ryan, APRN - CNP, 100 mg at 03/20/22 1109    [Held by provider] amLODIPine (NORVASC) tablet 5 mg, 5 mg, Oral, Daily, Bakari Ryan, APRN - CNP, 5 mg at 03/17/22 0827    [Held by provider] aspirin EC tablet 81 mg, 81 mg, Oral, Daily, Bakari Ryan, APRN - CNP, 81 mg at 03/16/22 0933    atorvastatin (LIPITOR) tablet 20 mg, 20 mg, Oral, Daily, Bakari Ryan, APRN - CNP, 20 mg at 03/20/22 1108    carvedilol (COREG) tablet 12.5 mg, 12.5 mg, Oral, BID WC, Makenzie Solis MD, 12.5 mg at 03/20/22 1108    vitamin B-12 (CYANOCOBALAMIN) tablet 1,000 mcg, 1,000 mcg, Oral, Daily, GIAN Mendoza CNP, 1,000 mcg at 03/20/22 1109    Vitamin D (CHOLECALCIFEROL) tablet 1,000 Units, 1,000 Units, Oral, Daily, GIAN Mendoza CNP, 1,000 Units at 03/20/22 1108    [Held by provider] trospium (SANCTURA) tablet 20 mg, 20 mg, Oral, Nightly, GIAN Mendoza CNP, 20 mg at 03/15/22 2127    loperamide (IMODIUM) capsule 2 mg, 2 mg, Oral, 4x Daily PRN, GIAN Mendoza - CNP    magnesium oxide (MAG-OX) tablet 400 mg, 400 mg, Oral, BID, GIAN Cannon - CNP, 400 mg at 03/20/22 1108    [Held by provider] isosorbide mononitrate (IMDUR) extended release tablet 30 mg, 30 mg, Oral, Daily, GIAN Cannon - CNP, 30 mg at 03/18/22 0945    [Held by provider] spironolactone (ALDACTONE) tablet 25 mg, 25 mg, Oral, Daily, Palak Verduzco MD, 25 mg at 03/18/22 0953    [Held by provider] sacubitril-valsartan (ENTRESTO) 24-26 MG per tablet 1 tablet, 1 tablet, Oral, BID, Palak Verduzco MD, 1 tablet at 03/18/22 0948          Assessment and Plan  Principal Problem:    Acute on chronic systolic heart failure, NYHA class 4 ([facility])  Active Problems:    JENNA (acute kidney injury) ([facility] [address]    UTI due to extended-spectrum beta lactamase (ESBL) producing Escherichia coli    Encephalopathy due to infection    Enterococcus UTI    Chronic atrial fibrillation (HCC)    Hypercalcemia    Coronary artery disease involving native coronary artery of native heart without angina pectoris    Generalized weakness    Hypothyroidism    Spasms of the hands or feet  Resolved Problems:    * No resolved hospital problems. [facility] Course: Patient was admitted on 3/13 with worsening SOB and leg swelling. She was recently diagnosed with systolic CHF and was started on entresto on her previous admission with plans to repeat a TTE at the end of march for possible ICD placement. Admitted with acute systolic heart failure in setting of markedly elevated proBNP and JENNA suspected related to cardiorenal syndrome. She received diuresis with IV lasix and aldactone on this admission with significant net negative fluid balance, improvement. Evaluated by cardiology with recommendation to follow-up outpatient to arrange ICD placement. Repeat TTE inpatient revealed EF 25-30%. Patient subsequently transitioned to oral Lasix and will continue on Entresto, Aldactone, and Coreg.   Hospital course complicated after patient developed worsening mental status with confusion, then on 3/16 patient fell out of bed with resultant right elbow and head trauma. No evidence of fracture on plain films of elbow. CT head with no intracranial bleeding or other abnormality aside from mild scalp soft tissue swelling in right frontal and parietal region. No evidence of skull fracture. Given head trauma, Xarelto and aspirin were held. Noted to have inappropriate elevated TSH, so increased Synthroid. Obtained MRI brain on 3/17 which revealed chronic ischemic changes with atrophy but no acute findings otherwise. Due to encephalopathy, repeat UA which showed active urine sediment consistent with UTI. Urine culture speciated ESBL E. coli and Enterococcus, with susceptibilities reviewed, antibiotics switched to meropenem and amoxicillin. Patient was noted to have some occasional myoclonic movement, given concern for acute change, repeated CT head which showed no interval change. EEG obtained and neurology consulted. Noted to have worsening hypercalcemia during hospital course with work-up initiated including PTH. Treated hypercalcemia with calcitonin.             Acute systolic heart failure  -Improved with significant net negative fluid balance with IV diuresis, continue on oral Lasix  --continue coreg  --Temporarily held Entresto and Aldactone due to renal function and BP intermittently on lower side, will resume as appropriate  - TTE shows EF of 25-30%, no significant improvement from previous  -Cardiology recommendations noted-outpatient cardiology follow-up to arrange ICD placement  -Will need LifeVest on discharge    Acute encephalopathy, with intermittent myoclonic movement, suspect infectious from UTI, possibly metabolic with JENNA and hypercalcemia, with possible hospital related delirium  -MRI brain revealed chronic changes with some atrophy, otherwise no acute abnormalities  -CT head repeated with no interval change  -Continue neurochecks  -Avoid/limit sedating meds    UTI with ESBL E. coli and Enterococcus from urine culture  Urine culture susceptibilities reviewed  -Treat ESBL E. coli with meropenem  -Treat Enterococcus with amoxicillin    Fall with head trauma, superficial hematoma  --Continue to hold aspirin and Xarelto    Hypercalcemia  -Check ionized calcium  -Check PTH, PTHrP, phosphorus, vitamin D  -Trial of calcitonin    Continue Synthroid at increased dose due to uncontrolled hypothyroidism with inappropriately elevated TSH     JENNA on CKD, suspect cardiorenal syndrome  -Stable, monitor urine output and labs, avoid nephrotoxic agents and hypotension    Poor oral intake with AMS  Continue IV fluid at low rate    A-fib  -Rate controlled on Coreg  --Xarelto held following fall with head trauma    HTN  -Continue Coreg, BP intermittently on lower side, continue to monitor with further adjustment as needed    hypothyroidism  -Inappropriately elevated TSH, continue Synthroid at increased dose, outpatient follow-up with repeat TSH in 6 weeks    Debility  Recently at [facility]  PT/OT      Awaiting rehab placement, hopefully this coming week pending clinical improvement        Diana Beltre MD 3/20/2022 4:02 PM

## 2022-03-20 NOTE — CONSULTS
[facility] Neurology Consult  ? ? Patient: Carol Talbot  MR#: [id number]  Account Number: [de-identified]   Room: [facility]2/980-52   YOB: 1940  Date of Progress Note: 3/20/2022  Time of Note 8:42 AM  Attending Physician: Hi Reyes MD  Consulting Physician: Lisa Ashley M.D.  ?  ? CHIEF COMPLAINT: Confusion and abnormal motor movements  ? HISTORY OF PRESENT ILLNESS:   This 79 y.o. female who was admitted on 3/13 with CHF and underwent a significant diuresis. Developed some confusion on 3/16 and fell out of bed hitting her head. CT of the head and MRI of the head showing no obvious intracranial abnormalities and no evidence of skull fracture. Due to encephalopathy a repeat UA was consistent with a urinary tract infection and she is now on Merrem. According to the nurse, she was having some intermittent myoclonic type movements but currently is having none. EEG is pending. REVIEW OF SYSTEMS:  Constitutional - No fever or chills. No diaphoresis or significant fatigue. HENT - No tinnitus or significant hearing loss. Eyes - no sudden vision change or eye pain  Respiratory - no significant shortness of breath or cough  Cardiovascular - no chest pain No palpitations or significant leg swelling  Gastrointestinal - no abdominal swelling or pain. Genitourinary - No difficulty urinating, dysuria  Musculoskeletal - no back pain or myalgia. Skin - no color change or rash  Neurologic - No seizures. No lateralizing weakness. Hematologic - no easy bruising or excessive bleeding. Psychiatric - no severe anxiety or nervousness. All other review of systems are negative. ?   Past Medical History:      Diagnosis Date    Arthritis     Bladder infection     CAD (coronary artery disease)     Cardiovascular disease     Chronic kidney disease     Colon polyps     Constipation     Gallbladder disease     GERD (gastroesophageal reflux disease)     Gout     Heart attack ([facility] [address]     Heart disease     Hyperlipidemia     Hypertension     Incontinence     Thyroid disease      Past Surgical History:      Procedure Laterality Date    CHOLECYSTECTOMY      CORONARY ARTERY BYPASS GRAFT      heart bypass    HYSTERECTOMY       Medications in Hospital:     Current Facility-Administered Medications:     magic butt cream, , Topical, Q4H PRN, Wanda Richey MD    meropenem (MERREM) 500 mg in sodium chloride 0.9% 50 mL (duplex), 500 mg, IntraVENous, Q12H, Wanda Richey MD, Stopped at 03/19/22 2156    dextrose 5 % in lactated ringers infusion, , IntraVENous, Continuous, Wanda Richey MD, Last Rate: 25 mL/hr at 03/19/22 1736, New Bag at 03/19/22 1736    polyethylene glycol (GLYCOLAX) packet 17 g, 17 g, Oral, Daily, Wanda Richey MD, 17 g at 03/19/22 0834    docusate sodium (COLACE) capsule 100 mg, 100 mg, Oral, Daily, Wanda Richey MD, 100 mg at 03/19/22 0834    levothyroxine (SYNTHROID) tablet 112 mcg, 112 mcg, Oral, Daily, Wanda Richey MD, 112 mcg at 03/20/22 0818    furosemide (LASIX) tablet 40 mg, 40 mg, Oral, Daily, Ofelia Olmedo MD, 40 mg at 03/19/22 0834    [Held by provider] rivaroxaban (XARELTO) tablet 15 mg, 15 mg, Oral, Destiny Mason MD, 15 mg at 03/15/22 2127    sodium chloride flush 0.9 % injection 5-40 mL, 5-40 mL, IntraVENous, 2 times per day, GIAN Lofton CNP, 10 mL at 03/19/22 2124    sodium chloride flush 0.9 % injection 5-40 mL, 5-40 mL, IntraVENous, PRN, GIAN Lofton CNP    0.9 % sodium chloride infusion, 25 mL, IntraVENous, PRN, GIAN Lofton CNP    ondansetron (ZOFRAN-ODT) disintegrating tablet 4 mg, 4 mg, Oral, Q8H PRN **OR** ondansetron (ZOFRAN) injection 4 mg, 4 mg, IntraVENous, Q6H PRN, Kathern Dereje, APRN - CNP    acetaminophen (TYLENOL) tablet 650 mg, 650 mg, Oral, Q6H PRN, 650 mg at 03/17/22 0902 **OR** acetaminophen (TYLENOL) suppository 650 mg, 650 mg, Rectal, Q6H PRN, Juventino Reyez, APRN - CNP    allopurinol (ZYLOPRIM) tablet 100 mg, 100 mg, Oral, BID, GIAN Richardson CNP, 100 mg at 03/19/22 2124    [Held by provider] amLODIPine (NORVASC) tablet 5 mg, 5 mg, Oral, Daily, GIAN Richardson CNP, 5 mg at 03/17/22 0827    [Held by provider] aspirin EC tablet 81 mg, 81 mg, Oral, Daily, GIAN Richardson CNP, 81 mg at 03/16/22 0933    atorvastatin (LIPITOR) tablet 20 mg, 20 mg, Oral, Daily, GIAN Richardson CNP, 20 mg at 03/19/22 0834    carvedilol (COREG) tablet 12.5 mg, 12.5 mg, Oral, BID WC, Prabhjot Freeman MD, 12.5 mg at 03/19/22 1736    vitamin B-12 (CYANOCOBALAMIN) tablet 1,000 mcg, 1,000 mcg, Oral, Daily, GIAN Richardson CNP, 1,000 mcg at 03/19/22 2754    Vitamin D (CHOLECALCIFEROL) tablet 1,000 Units, 1,000 Units, Oral, Daily, GIAN Richardson CNP, 1,000 Units at 03/19/22 0834    [Held by provider] trospium (SANCTURA) tablet 20 mg, 20 mg, Oral, Nightly, GIAN Richardson CNP, 20 mg at 03/15/22 2127    loperamide (IMODIUM) capsule 2 mg, 2 mg, Oral, 4x Daily PRN, GIAN Richardson CNP    magnesium oxide (MAG-OX) tablet 400 mg, 400 mg, Oral, BID, GIAN Richardson CNP, 400 mg at 03/19/22 2124    [Held by provider] isosorbide mononitrate (IMDUR) extended release tablet 30 mg, 30 mg, Oral, Daily, GIAN Richardson CNP, 30 mg at 03/18/22 0945    [Held by provider] spironolactone (ALDACTONE) tablet 25 mg, 25 mg, Oral, Daily, Prabhjot Freeman MD, 25 mg at 03/18/22 0953    [Held by provider] sacubitril-valsartan (ENTRESTO) 24-26 MG per tablet 1 tablet, 1 tablet, Oral, BID, Prabhjot Freeman MD, 1 tablet at 03/18/22 3253  Allergies: Patient has no known allergies. Social History:   TOBACCO:  reports that she has never smoked. She has never used smokeless tobacco.  ETOH:  reports previous alcohol use. Family History:       Problem Relation Age of Onset    Heart Attack Father     Cancer Mother      ? ?   Physical Exam:    Vitals: /62   Pulse 70   Temp 97 °F (36.1 °C) (Temporal)   Resp 16   Ht 5' 3\" (1.6 m)   Wt 167 lb 1.6 oz (75.8 kg)   SpO2 94%   BMI 29.60 kg/m²   Constitutional - well developed, well nourished. Eyes - conjunctiva normal. Pupils react to light  Ear, nose, throat -hearing intact to finger rub No scars, masses, or lesions over external nose or ears, no atrophy of tongue  Neck-symmetric, no masses noted, no jugular vein distension  Respiration- chest wall appears symmetric, good expansion,   normal effort without use of accessory muscles  Musculoskeletal - no significant wasting of muscles noted, no bony deformities  Extremities-no clubbing, cyanosis or edema  Skin - warm, dry, and intact. No rash, erythema, or pallor. Psychiatric - mood, affect, and behavior appear normal.   Neurological exam  Patient is lying in bed with her eyes closed but answers simple questions of orientation appropriately and follows all simple commands. Forcefully keeps her eyes closed when I try to open them. Cranial Nerve Exam   CN II- Visual fields are unable to be assessed  CN III, IV,VI-EOMI, No nystagmus, conjugate eye movements, no ptosis  CN VII-no facial assymetry  CN VIII-Hearing intact to voice  She would protrude her tongue to command and it was midline. Motor Exam   strength normal when she is able to raise both legs  Sensory Exam  Unreliable  Reflexes   Trace throughout. No Babinski  Tremors- no tremors in hands or head noted  Gait  Not tested  Coordination  Unable to assess  ? ?  CBC:   Recent Labs     03/18/22 0224 03/19/22  0252 03/20/22  0302   WBC 5.6 6.3 5.9   HGB 10.0* 9.5* 10.1*    219 234     BMP:   Recent Labs     03/18/22 0224 03/19/22  0252 03/20/22  0302    138 137   K 4.3 4.8 4.6    100 99   CO2 26 26 25   BUN 45* 57* 54*   CREATININE 1.7* 1.7* 1.6*   GLUCOSE 120* 122* 100     Hepatic: No results for input(s): AST, ALT, ALB, BILITOT, ALKPHOS in the last 72 hours.   Lipids: No results for input(s): CHOL, HDL in the last 72 hours. Invalid input(s): LDLCALCU  INR: No results for input(s): INR in the last 72 hours. ?  ?  Assessment and Plan   1. Elderly woman with CHF and recent diuresis and then encephalopathy and closed head injury. May have a postconcussive syndrome. Check EEG to rule out nonconvulsive status. Ammonia level ordered. Also has acute kidney injury which may be contributing. Avoid any sedating medications. Urinary tract infection may be playing a role as well. We will continue to treat that and if mental status does not improve would consider changing antibiotics. Long discussion with nurse. We will follow with you. ?  ?       Yan Trinidad MD  Board Certified in Neurology

## 2022-03-20 NOTE — PLAN OF CARE
Problem: Falls - Risk of:  Goal: Will remain free from falls  Description: Will remain free from falls  Outcome: Ongoing  Goal: Absence of physical injury  Description: Absence of physical injury  Outcome: Ongoing     Problem: OXYGENATION/RESPIRATORY FUNCTION  Goal: Patient will maintain patent airway  Outcome: Ongoing  Goal: Patient will achieve/maintain normal respiratory rate/effort  Description: Respiratory rate and effort will be within normal limits for the patient  Outcome: Ongoing     Problem: HEMODYNAMIC STATUS  Goal: Patient has stable vital signs and fluid balance  Outcome: Ongoing     Problem: FLUID AND ELECTROLYTE IMBALANCE  Goal: Fluid and electrolyte balance are achieved/maintained  Outcome: Ongoing     Problem: ACTIVITY INTOLERANCE/IMPAIRED MOBILITY  Goal: Mobility/activity is maintained at optimum level for patient  Outcome: Ongoing     Problem: Nutrition  Goal: Optimal nutrition therapy  Outcome: Ongoing     Problem: Pain:  Goal: Pain level will decrease  Description: Pain level will decrease  Outcome: Ongoing  Goal: Control of acute pain  Description: Control of acute pain  Outcome: Ongoing  Goal: Control of chronic pain  Description: Control of chronic pain  Outcome: Ongoing     Problem: Skin Integrity:  Goal: Will show no infection signs and symptoms  Description: Will show no infection signs and symptoms  Outcome: Ongoing  Goal: Absence of new skin breakdown  Description: Absence of new skin breakdown  Outcome: Ongoing

## 2022-03-21 LAB
ALBUMIN SERPL-MCNC: 3.6 G/DL (ref 3.5–5.2)
ANION GAP SERPL CALCULATED.3IONS-SCNC: 10 MMOL/L (ref 7–19)
BUN BLDV-MCNC: 45 MG/DL (ref 8–23)
CALCIUM SERPL-MCNC: 10.4 MG/DL (ref 8.8–10.2)
CHLORIDE BLD-SCNC: 102 MMOL/L (ref 98–111)
CO2: 26 MMOL/L (ref 22–29)
CREAT SERPL-MCNC: 1.6 MG/DL (ref 0.5–0.9)
GFR AFRICAN AMERICAN: 37
GFR NON-AFRICAN AMERICAN: 31
GLUCOSE BLD-MCNC: 142 MG/DL (ref 74–109)
HCT VFR BLD CALC: 30.9 % (ref 37–47)
HEMOGLOBIN: 9.5 G/DL (ref 12–16)
MCH RBC QN AUTO: 27.1 PG (ref 27–31)
MCHC RBC AUTO-ENTMCNC: 30.7 G/DL (ref 33–37)
MCV RBC AUTO: 88.3 FL (ref 81–99)
PDW BLD-RTO: 16.4 % (ref 11.5–14.5)
PLATELET # BLD: 228 K/UL (ref 130–400)
PMV BLD AUTO: 10.7 FL (ref 9.4–12.3)
POTASSIUM REFLEX MAGNESIUM: 4.7 MMOL/L (ref 3.5–5)
RBC # BLD: 3.5 M/UL (ref 4.2–5.4)
SODIUM BLD-SCNC: 138 MMOL/L (ref 136–145)
WBC # BLD: 6 K/UL (ref 4.8–10.8)

## 2022-03-21 PROCEDURE — 6360000002 HC RX W HCPCS: Performed by: STUDENT IN AN ORGANIZED HEALTH CARE EDUCATION/TRAINING PROGRAM

## 2022-03-21 PROCEDURE — 36415 COLL VENOUS BLD VENIPUNCTURE: CPT

## 2022-03-21 PROCEDURE — 99232 SBSQ HOSP IP/OBS MODERATE 35: CPT | Performed by: PSYCHIATRY & NEUROLOGY

## 2022-03-21 PROCEDURE — 6370000000 HC RX 637 (ALT 250 FOR IP): Performed by: STUDENT IN AN ORGANIZED HEALTH CARE EDUCATION/TRAINING PROGRAM

## 2022-03-21 PROCEDURE — 85027 COMPLETE CBC AUTOMATED: CPT

## 2022-03-21 PROCEDURE — 80048 BASIC METABOLIC PNL TOTAL CA: CPT

## 2022-03-21 PROCEDURE — 2140000000 HC CCU INTERMEDIATE R&B

## 2022-03-21 PROCEDURE — 6370000000 HC RX 637 (ALT 250 FOR IP)

## 2022-03-21 PROCEDURE — 97161 PT EVAL LOW COMPLEX 20 MIN: CPT

## 2022-03-21 PROCEDURE — 82040 ASSAY OF SERUM ALBUMIN: CPT

## 2022-03-21 PROCEDURE — 94640 AIRWAY INHALATION TREATMENT: CPT

## 2022-03-21 PROCEDURE — 2580000003 HC RX 258: Performed by: STUDENT IN AN ORGANIZED HEALTH CARE EDUCATION/TRAINING PROGRAM

## 2022-03-21 PROCEDURE — 2580000003 HC RX 258

## 2022-03-21 RX ORDER — THIAMINE HYDROCHLORIDE 100 MG/ML
100 INJECTION, SOLUTION INTRAMUSCULAR; INTRAVENOUS DAILY
Status: DISCONTINUED | OUTPATIENT
Start: 2022-03-21 | End: 2022-03-25 | Stop reason: HOSPADM

## 2022-03-21 RX ORDER — LEVOTHYROXINE SODIUM 20 UG/ML
75 INJECTION, SOLUTION INTRAVENOUS DAILY
Status: DISCONTINUED | OUTPATIENT
Start: 2022-03-27 | End: 2022-03-25 | Stop reason: HOSPADM

## 2022-03-21 RX ORDER — METOPROLOL TARTRATE 5 MG/5ML
5 INJECTION INTRAVENOUS EVERY 6 HOURS PRN
Status: DISCONTINUED | OUTPATIENT
Start: 2022-03-21 | End: 2022-03-25 | Stop reason: HOSPADM

## 2022-03-21 RX ADMIN — Medication: at 03:07

## 2022-03-21 RX ADMIN — AMPICILLIN SODIUM 2000 MG: 2 INJECTION, POWDER, FOR SOLUTION INTRAMUSCULAR; INTRAVENOUS at 20:17

## 2022-03-21 RX ADMIN — MEROPENEM AND SODIUM CHLORIDE 500 MG: 500 INJECTION, SOLUTION INTRAVENOUS at 11:46

## 2022-03-21 RX ADMIN — IPRATROPIUM BROMIDE 0.5 MG: 0.5 SOLUTION RESPIRATORY (INHALATION) at 18:34

## 2022-03-21 RX ADMIN — AMPICILLIN SODIUM 2000 MG: 2 INJECTION, POWDER, FOR SOLUTION INTRAMUSCULAR; INTRAVENOUS at 08:57

## 2022-03-21 RX ADMIN — THIAMINE HYDROCHLORIDE 100 MG: 100 INJECTION, SOLUTION INTRAMUSCULAR; INTRAVENOUS at 16:19

## 2022-03-21 RX ADMIN — SODIUM CHLORIDE, PRESERVATIVE FREE 10 ML: 5 INJECTION INTRAVENOUS at 20:17

## 2022-03-21 RX ADMIN — IPRATROPIUM BROMIDE 0.5 MG: 0.5 SOLUTION RESPIRATORY (INHALATION) at 14:52

## 2022-03-21 RX ADMIN — SODIUM CHLORIDE, SODIUM LACTATE, POTASSIUM CHLORIDE, CALCIUM CHLORIDE AND DEXTROSE MONOHYDRATE: 5; 600; 310; 30; 20 INJECTION, SOLUTION INTRAVENOUS at 03:19

## 2022-03-21 RX ADMIN — LOPERAMIDE HYDROCHLORIDE 2 MG: 2 CAPSULE ORAL at 02:56

## 2022-03-21 RX ADMIN — SODIUM CHLORIDE, PRESERVATIVE FREE 10 ML: 5 INJECTION INTRAVENOUS at 08:57

## 2022-03-21 RX ADMIN — MEROPENEM AND SODIUM CHLORIDE 500 MG: 500 INJECTION, SOLUTION INTRAVENOUS at 22:00

## 2022-03-21 NOTE — PLAN OF CARE
Problem: Falls - Risk of:  Goal: Will remain free from falls  Description: Will remain free from falls  3/21/2022 0438 by Deondre Bailey RN  Outcome: Ongoing  3/20/2022 1458 by Francisca Zacarias RN  Outcome: Ongoing  Goal: Absence of physical injury  Description: Absence of physical injury  3/21/2022 0438 by Deondre Bailey RN  Outcome: Ongoing  3/20/2022 1458 by Francisca Zacarias RN  Outcome: Ongoing     Problem: OXYGENATION/RESPIRATORY FUNCTION  Goal: Patient will maintain patent airway  Outcome: Ongoing  Goal: Patient will achieve/maintain normal respiratory rate/effort  Description: Respiratory rate and effort will be within normal limits for the patient  Outcome: Ongoing     Problem: HEMODYNAMIC STATUS  Goal: Patient has stable vital signs and fluid balance  Outcome: Ongoing     Problem: FLUID AND ELECTROLYTE IMBALANCE  Goal: Fluid and electrolyte balance are achieved/maintained  Outcome: Ongoing     Problem: ACTIVITY INTOLERANCE/IMPAIRED MOBILITY  Goal: Mobility/activity is maintained at optimum level for patient  Outcome: Ongoing     Problem: Nutrition  Goal: Optimal nutrition therapy  Outcome: Ongoing     Problem: Pain:  Goal: Pain level will decrease  Description: Pain level will decrease  Outcome: Ongoing  Goal: Control of acute pain  Description: Control of acute pain  Outcome: Ongoing  Goal: Control of chronic pain  Description: Control of chronic pain  Outcome: Ongoing     Problem: Skin Integrity:  Goal: Will show no infection signs and symptoms  Description: Will show no infection signs and symptoms  Outcome: Ongoing  Goal: Absence of new skin breakdown  Description: Absence of new skin breakdown  Outcome: Ongoing

## 2022-03-21 NOTE — PROGRESS NOTES
Daily Progress Note    Date:3/21/2022  Patient: Nancy Mobley  : 1940  WDS:[id number]  CODE:DNR No additional code details  PCP:Delonte Zepeda    Admit Date: 3/13/2022  3:10 PM   LOS: 8 days         Subjective:    Patient somnolent but arousable this a.m. Still with some confusion. No agitation. Unable to take p.o. meds last night. No fevers overnight. Breathing comfortably without shortness of breath. Remains with significant net negative fluid balance following diuresis.   No apparent abnormal movements or myoclonic jerking noted this AM.        Review of Systems  Unable to complete comprehensive ROS due to AMS        Objective:      Vital signs in last 24 hours:  Patient Vitals for the past 24 hrs:   BP Temp Temp src Pulse Resp SpO2 Weight   22 1452 -- -- -- -- 16 95 % --   22 1406 114/62 97.2 °F (36.2 °C) Temporal 76 16 94 % --   22 1027 (!) 119/57 97.4 °F (36.3 °C) Temporal 78 16 93 % --   22 0516 110/68 97.6 °F (36.4 °C) Temporal 73 16 95 % 176 lb 4.8 oz (80 kg)   22 2342 102/60 98 °F (36.7 °C) Temporal 66 16 93 % --   22 1720 (!) 96/57 97.6 °F (36.4 °C) Temporal 62 12 95 % --     Physical exam    General: Elderly frail-appearing female, fatigued appearing, no distress  HEENT: areas of soft tissue swelling/superficial hematoma on scalp in healing stage  Cardiovascular: Normal rate, regular rhythm, pulses 2+ and equal  Respiratory: Diminished breath sounds bilaterally without wheezes or rales  Abdomen: Soft, nontender, bowel sounds present  Neurologic: Somnolent but arousable to verbal stimuli, confused, disoriented  Musculoskeletal: Loss of subcutaneous fat, appears malnourished  Extremities: No clubbing or cyanosis  Skin: Warm and dry        Lab Review   Recent Results (from the past 24 hour(s))   CBC    Collection Time: 22  2:37 AM   Result Value Ref Range    WBC 6.0 4.8 - 10.8 K/uL    RBC 3.50 (L) 4.20 - 5.40 M/uL    Hemoglobin 9.5 (L) 12.0 - 16.0 g/dL    Hematocrit 30.9 (L) 37.0 - 47.0 %    MCV 88.3 81.0 - 99.0 fL    MCH 27.1 27.0 - 31.0 pg    MCHC 30.7 (L) 33.0 - 37.0 g/dL    RDW 16.4 (H) 11.5 - 14.5 %    Platelets 685 469 - 135 K/uL    MPV 10.7 9.4 - 12.3 fL   Basic Metabolic Panel w/ Reflex to MG    Collection Time: 03/21/22  2:37 AM   Result Value Ref Range    Sodium 138 136 - 145 mmol/L    Potassium reflex Magnesium 4.7 3.5 - 5.0 mmol/L    Chloride 102 98 - 111 mmol/L    CO2 26 22 - 29 mmol/L    Anion Gap 10 7 - 19 mmol/L    Glucose 142 (H) 74 - 109 mg/dL    BUN 45 (H) 8 - 23 mg/dL    CREATININE 1.6 (H) 0.5 - 0.9 mg/dL    GFR Non- 31 (A) >60    GFR  37 (L) >59    Calcium 10.4 (H) 8.8 - 10.2 mg/dL   Albumin    Collection Time: 03/21/22  2:37 AM   Result Value Ref Range    Albumin 3.6 3.5 - 5.2 g/dL       I/O last 3 completed shifts: In: 2655 [P.O.:760; I.V.:1895]  [facility] 34[facility]  I/O this shift:  In: 2109.9 [P.O.:60; I.V.:1949.9;  IV Piggyback:100]  Out: 450 [Urine:450]      Current Facility-Administered Medications:     ampicillin 2000 mg ivpb mini bag, 2,000 mg, IntraVENous, 2 times per day, Pietro Galloway MD, Stopped at 03/21/22 1300    ipratropium (ATROVENT) 0.02 % nebulizer solution 0.5 mg, 0.5 mg, Nebulization, TID, Pietro Galloway MD, 0.5 mg at 03/21/22 1452    magic butt cream, , Topical, Q4H PRN, Pietro Galloway MD, Given at 03/21/22 0307    thiamine mononitrate tablet 100 mg, 100 mg, Oral, Daily, Pietro Galloway MD    meropenem (MERREM) 500 mg in sodium chloride 0.9% 50 mL (duplex), 500 mg, IntraVENous, Q12H, Pietro Galloway MD, Stopped at 03/21/22 1257    dextrose 5 % in lactated ringers infusion, , IntraVENous, Continuous, Pietro Galloway MD, Stopped at 03/21/22 1132    polyethylene glycol (GLYCOLAX) packet 17 g, 17 g, Oral, Daily, Pietro Galloway MD, 17 g at 03/20/22 1107    docusate sodium (COLACE) capsule 100 mg, 100 mg, Oral, Daily, Pietro Galloway MD, 100 mg at 03/20/22 1108    levothyroxine (SYNTHROID) tablet 112 mcg, 112 mcg, Oral, Daily, Diana Beltre MD, 112 mcg at 03/20/22 0818    furosemide (LASIX) tablet 40 mg, 40 mg, Oral, Daily, Ari Evans MD, 40 mg at 03/20/22 1108    [Held by provider] rivaroxaban (XARELTO) tablet 15 mg, 15 mg, Oral, Dinner, Ari Evans MD, 15 mg at 03/15/22 2127    sodium chloride flush 0.9 % injection 5-40 mL, 5-40 mL, IntraVENous, 2 times per day, Jyl Jolie, APRN - CNP, 10 mL at 03/21/22 0857    sodium chloride flush 0.9 % injection 5-40 mL, 5-40 mL, IntraVENous, PRN, Jyl Jolie, APRN - CNP    0.9 % sodium chloride infusion, 25 mL, IntraVENous, PRN, Jyl Jolie, APRN - CNP    ondansetron (ZOFRAN-ODT) disintegrating tablet 4 mg, 4 mg, Oral, Q8H PRN **OR** ondansetron (ZOFRAN) injection 4 mg, 4 mg, IntraVENous, Q6H PRN, Jyl Jolie, APRN - CNP    acetaminophen (TYLENOL) tablet 650 mg, 650 mg, Oral, Q6H PRN, 650 mg at 03/20/22 1141 **OR** acetaminophen (TYLENOL) suppository 650 mg, 650 mg, Rectal, Q6H PRN, Jyl Jolie, APRN - CNP    allopurinol (ZYLOPRIM) tablet 100 mg, 100 mg, Oral, BID, Jyl Jolie, APRN - CNP, 100 mg at 03/20/22 1109    [Held by provider] amLODIPine (NORVASC) tablet 5 mg, 5 mg, Oral, Daily, Jyl Jolie, APRN - CNP, 5 mg at 03/17/22 0827    [Held by provider] aspirin EC tablet 81 mg, 81 mg, Oral, Daily, Jyl Jolie, APRN - CNP, 81 mg at 03/16/22 0933    atorvastatin (LIPITOR) tablet 20 mg, 20 mg, Oral, Daily, Jyl Jolie, APRN - CNP, 20 mg at 03/20/22 1108    carvedilol (COREG) tablet 12.5 mg, 12.5 mg, Oral, BID SHAKIRA, Diana Beltre MD, 12.5 mg at 03/20/22 1108    vitamin B-12 (CYANOCOBALAMIN) tablet 1,000 mcg, 1,000 mcg, Oral, Daily, GIAN Ma CNP, 1,000 mcg at 03/20/22 1109    Vitamin D (CHOLECALCIFEROL) tablet 1,000 Units, 1,000 Units, Oral, Daily, GIAN Ma CNP, 1,000 Units at 03/20/22 1108    [Held by provider] trospium (SANCTURA) tablet 20 mg, 20 mg, Oral, Nightly, Neel GIAN Coats - CNP, 20 mg at 03/15/22 2127    loperamide (IMODIUM) capsule 2 mg, 2 mg, Oral, 4x Daily PRN, Neel GIAN Coats - CNP, 2 mg at 03/21/22 0256    magnesium oxide (MAG-OX) tablet 400 mg, 400 mg, Oral, BID, GIAN Bhatti - CNP, 400 mg at 03/20/22 1108    [Held by provider] isosorbide mononitrate (IMDUR) extended release tablet 30 mg, 30 mg, Oral, Daily, Neel GIAN Coats - CNP, 30 mg at 03/18/22 0945    [Held by provider] spironolactone (ALDACTONE) tablet 25 mg, 25 mg, Oral, Daily, Maged Walker MD, 25 mg at 03/18/22 0953    [Held by provider] sacubitril-valsartan (ENTRESTO) 24-26 MG per tablet 1 tablet, 1 tablet, Oral, BID, Maged Walker MD, 1 tablet at 03/18/22 0948          Assessment and Plan  Principal Problem:    Acute on chronic systolic heart failure, NYHA class 4 ([facility] [address]  Active Problems:    JENNA (acute kidney injury) ([facility] [address]    UTI due to extended-spectrum beta lactamase (ESBL) producing Escherichia coli    Encephalopathy due to infection    Enterococcus UTI    Chronic atrial fibrillation (HCC)    Hypercalcemia    Coronary artery disease involving native coronary artery of native heart without angina pectoris    Generalized weakness    Hypothyroidism    Spasms of the hands or feet  Resolved Problems:    * No resolved hospital problems. [facility] Course: Patient was admitted on 3/13 with worsening SOB and leg swelling. She was recently diagnosed with systolic CHF and was started on entresto on her previous admission with plans to repeat a TTE at the end of march for possible ICD placement. Admitted with acute systolic heart failure in setting of markedly elevated proBNP and JENNA suspected related to cardiorenal syndrome. She received diuresis with IV lasix and aldactone on this admission with significant net negative fluid balance, improvement. Evaluated by cardiology with recommendation to follow-up outpatient to arrange ICD placement. hypercalcemia, with possible hospital related delirium  --Speech evaluation  -Neuro imaging reviewed  -Continue neurochecks with low threshold for repeat CT head for any acute changes  -Avoid/limit sedating meds    UTI with ESBL E. coli and Enterococcus from urine culture  Urine culture susceptibilities reviewed  -Continue treatment with meropenem for ESBL E. coli  -Treat Enterococcus with amoxicillin    Fall with head trauma, superficial hematoma  --Continue to hold aspirin and Xarelto    Hypercalcemia  -Follow ionized calcium, no evidence of hyperparathyroidism, normal vitamin D and phosphorus  -Improved some with dose of calcitonin    JENNA on CKD, suspect cardiorenal syndrome  -Stable, monitor urine output and labs, avoid nephrotoxic agents and hypotension    Poor oral intake with AMS  Continue IV fluid at low rate    A-fib  -Rate controlled, if unable to take p.o.  Coreg may add IV Lopressor as needed  --Xarelto held following fall with head trauma    HTN  -Monitor, as needed IV antihypertensives if uncontrolled    hypothyroidism  -Inappropriately elevated TSH, continue Synthroid at increased dose, switch to IV dosing if unable to take p.o., outpatient follow-up with repeat TSH in 6 weeks    Debility  Recently at [facility]  PT/OT      disposition TBD pending clinical improvement of above issues, plan to discharge to rehab facility when appropriate        Mikayla Cintron MD 3/21/2022 3:50 PM

## 2022-03-21 NOTE — PROGRESS NOTES
Clinical Pharmacy Progress Note    Intravenous levothyroxine has been ordered for patient. Per IV levothyroxine protocol, order has been verified by pharmacist with a start date 6 days from today. Until that time, chart will be reviewed by pharmacist daily, and if patient able to tolerate PO medications, the levothyroxine order will be converted to PO at appropriate dose conversion (3:4 IV ? PO conversion).      Orders for IV levothyroxine will not be deferred in the following cases:              Indication of myxedema coma  Patient awaiting harvest of organs for donation    Criteria for IV to PO conversion:  Diet ordered and patient tolerating (no nausea, vomiting, diarrhea)  Other PO meds are being administered         For questions about this protocol, please call pharmacy @ [phone]      Electronically signed by MYRIAM [facility] on 3/21/2022 at 4:17 PM

## 2022-03-21 NOTE — PROGRESS NOTES
Pharmacy Renal Adjustment    Alex Hendricks is a 79 y.o. female. Pharmacy has renally adjusted medications per protocol. Recent Labs     03/20/22  0302 03/21/22  0237   BUN 54* 45*       Recent Labs     03/20/22  0302 03/21/22  0237   CREATININE 1.6* 1.6*       Estimated Creatinine Clearance: 27 mL/min (A) (based on SCr of 1.6 mg/dL (H)). Height:   Ht Readings from Last 1 Encounters:   03/18/22 5' 3\" (1.6 m)     Weight:  Wt Readings from Last 1 Encounters:   03/21/22 176 lb 4.8 oz (80 kg)       Plan: Adjust the following medications based on renal function:           Ampicillin 1000mg IV q6hr to 2000 mg IV q12hr.     Electronically signed by Evelina Burgess [facility] on 3/21/2022 at 8:04 AM

## 2022-03-21 NOTE — PLAN OF CARE
Problem: Falls - Risk of:  Goal: Will remain free from falls  Description: Will remain free from falls  3/21/2022 1140 by Elroy Sanchez RN  Outcome: Ongoing  3/21/2022 0438 by Gabino Groves RN  Outcome: Ongoing  Goal: Absence of physical injury  Description: Absence of physical injury  3/21/2022 1140 by Elroy Sanchez RN  Outcome: Ongoing  3/21/2022 0438 by Gabino Groves RN  Outcome: Ongoing     Problem: OXYGENATION/RESPIRATORY FUNCTION  Goal: Patient will maintain patent airway  3/21/2022 1140 by Elroy Sanchez RN  Outcome: Ongoing  3/21/2022 0438 by Gabino Groves RN  Outcome: Ongoing  Goal: Patient will achieve/maintain normal respiratory rate/effort  Description: Respiratory rate and effort will be within normal limits for the patient  3/21/2022 1140 by Elroy Sanchez RN  Outcome: Ongoing  3/21/2022 0438 by Gabino Groves RN  Outcome: Ongoing     Problem: HEMODYNAMIC STATUS  Goal: Patient has stable vital signs and fluid balance  3/21/2022 1140 by Elroy Sanchez RN  Outcome: Ongoing  3/21/2022 0438 by Gabino Groves RN  Outcome: Ongoing     Problem: FLUID AND ELECTROLYTE IMBALANCE  Goal: Fluid and electrolyte balance are achieved/maintained  3/21/2022 1140 by Elroy Sanchez RN  Outcome: Ongoing  3/21/2022 0438 by Gabino Groves RN  Outcome: Ongoing     Problem: ACTIVITY INTOLERANCE/IMPAIRED MOBILITY  Goal: Mobility/activity is maintained at optimum level for patient  3/21/2022 1140 by Elroy Sanchez RN  Outcome: Ongoing  3/21/2022 0438 by Gabino Groves RN  Outcome: Ongoing     Problem: Nutrition  Goal: Optimal nutrition therapy  3/21/2022 1140 by Elroy Sanchez RN  Outcome: Ongoing  3/21/2022 0438 by Gabino Groves RN  Outcome: Ongoing     Problem: Pain:  Goal: Pain level will decrease  Description: Pain level will decrease  3/21/2022 1140 by Elroy Sanchez RN  Outcome: Ongoing  3/21/2022 0438 by Gabino Groves RN  Outcome: Ongoing  Goal: Control of acute pain  Description: Control of acute pain  3/21/2022 1140 by Sam Hernandez RN  Outcome: Ongoing  3/21/2022 0438 by Lauren Belle RN  Outcome: Ongoing  Goal: Control of chronic pain  Description: Control of chronic pain  3/21/2022 1140 by Sam Hernandez RN  Outcome: Ongoing  3/21/2022 0438 by Lauren Belle RN  Outcome: Ongoing     Problem: Skin Integrity:  Goal: Will show no infection signs and symptoms  Description: Will show no infection signs and symptoms  3/21/2022 1140 by Sam Hernandez RN  Outcome: Ongoing  3/21/2022 0438 by Lauren Belle RN  Outcome: Ongoing  Goal: Absence of new skin breakdown  Description: Absence of new skin breakdown  3/21/2022 1140 by Sam Hernandez RN  Outcome: Ongoing  3/21/2022 0438 by Lauren Belle RN  Outcome: Ongoing

## 2022-03-21 NOTE — PROCEDURES
[facility] HARJIT Blum 77, [address] [facility]                          ELECTROENCEPHALOGRAM REPORT    PATIENT NAME: Channing Marks                     :        1940  MED REC NO:   [id number]                              ROOM:       [facility]  ACCOUNT NO:   [de-identified]                           ADMIT DATE: 2022  PROVIDER:     Cheryl Pinon MD    DATE OF EE2022    INDICATION FOR TEST:  Altered mental status. DESCRIPTION:  The waking background consists of a rhythmic and symmetric  well-formed and well-regulated posterior dominant 6 to 7 Hz activity at  best.  Intermittent brief generalized slowing is noted. Frequent eye  movement artifact and electrode artifact is noted. Stage II sleep is  not seen. Photic stimulation produced no abnormalities. No focal  slowing nor any epileptiform activity is seen. IMPRESSION:  Findings are consistent with nonspecific encephalopathy. Correlate clinically.         Fernando Beck MD    D: 2022 9:12:03      T: 2022 9:15:17     [id number]  Job#: [id number]     Doc#: [id number]    CC:

## 2022-03-21 NOTE — PROGRESS NOTES
Patient:   Socorro Oppenheim  MR#:    [id number]   Room:    [address] [place], [place] [address]   YOB: 1940  Date of Progress Note: 3/21/2022  Time of Note                           7:58 AM  Consulting Physician:   Patricia Otto M.D. Attending Physician:  Celso Montana MD       CHIEF COMPLAINT: Confusion and abnormal motor movements  ? Subjective  This 79 y.o. female who was admitted on 3/13 with CHF and underwent a significant diuresis. Developed some confusion on 3/16 and fell out of bed hitting her head. CT of the head and MRI of the head showing no obvious intracranial abnormalities and no evidence of skull fracture. Due to encephalopathy a repeat UA was consistent with a urinary tract infection and she is now on Merrem. According to the nurse, she was having some intermittent myoclonic type movements but currently is having none. No new problems noted overnight  REVIEW OF SYSTEMS:  Constitutional: No fevers No chills  Neck:No stiffness  Respiratory: No shortness of breath  Cardiovascular: No chest pain No palpitations  Gastrointestinal: No abdominal pain    Genitourinary: No Dysuria  Neurological: No headache, no seizures      PHYSICAL EXAM:  /68   Pulse 73   Temp 97.6 °F (36.4 °C) (Temporal)   Resp 16   Ht 5' 3\" (1.6 m)   Wt 176 lb 4.8 oz (80 kg)   SpO2 95%   BMI 31.23 kg/m²     Constitutional: she appears well-developed and well-nourished. Eyes - conjunctiva normal.  Pupils react to light  Ear, nose, throat -hearing intact to voice. No scars, masses, or lesions over external nose or ears, no atrophy of tongue  Neck-symmetric, no masses noted, no jugular vein distension  Respiration- chest wall appears symmetric, good expansion,   normal effort without use of accessory muscles  Cardiovascular- RRR  Musculoskeletal - no significant wasting of muscles noted, no bony deformities, gait no gross ataxia  Extremities-no clubbing, cyanosis or edema  Skin - warm, dry, and intact.   No rash, erythema, or pallor. Psychiatric - mood, affect, and behavior appear normal.      Neurology  NEUROLOGICAL EXAM:      Mental status   Patient drowsy but awakens to voice. Follows simple commands. Cranial Nerves   CN II- Visual fields grossly unremarkable  CN III, IV,VI-EOMI, No nystagmus, conjugate eye movements, no ptosis  CN VII-no facial asymmetry  CN VIII-Hearing intact   CN IX and X- Palate elevates in midline  CN XI-good shoulder shrug  CN XII-Tongue midline with no fasciculations or fibrillations          Motor function  Antigravity x 4             Tremor None present     Gait                  Not tested           Nursing/pcp notes, imaging,labs and vitals reviewed. PT,OT and/or speech notes reviewed    Lab Results   Component Value Date    WBC 6.0 03/21/2022    HGB 9.5 (L) 03/21/2022    HCT 30.9 (L) 03/21/2022    MCV 88.3 03/21/2022     03/21/2022     Lab Results   Component Value Date     03/21/2022    K 4.7 03/21/2022     03/21/2022    CO2 26 03/21/2022    BUN 45 (H) 03/21/2022    CREATININE 1.6 (H) 03/21/2022    GLUCOSE 142 (H) 03/21/2022    CALCIUM 10.4 (H) 03/21/2022    PROT 7.5 03/13/2022    LABALBU 3.6 03/21/2022    BILITOT 0.7 03/13/2022    ALKPHOS 90 03/13/2022    AST 17 03/13/2022    ALT 11 03/13/2022    LABGLOM 31 (A) 03/21/2022    GFRAA 37 (L) 03/21/2022     Lab Results   Component Value Date    INR 1.86 (H) 03/13/2022    INR 1.05 12/31/2021    INR 0.97 11/22/2021   No results found for: PHENYTOIN, ESR, CRP          RECORD REVIEW: Previous medical records, medications were reviewed at today's visit    IMPRESSION:   1. Elderly woman with CHF, encephalopathy. Suspect the latter is multifactorial and related to UTI and probable postconcussive syndrome. EEG showing no sign of ongoing activity. Normal ammonia level. MRI of the head showing no evidence of brain injury/infarction. Continue supportive care and avoid sedating medications.   Would change Merrem to a different antibiotic as the former can sometimes cause encephalopathy. 2.  A. fib-Coreg/holding Xarelto  3.   JENNA on CKD-continue to monitor

## 2022-03-21 NOTE — CARE COORDINATION
Patient seen by Dr. Xavier Morris, deemed stable from Cardiology standpoint. Per Dr. Xavier Morris will sign off at this point, for patient to be discharged with Life vest and awaiting ICD in April. Please contact if any Cardiology issues arise during this hospital stay.

## 2022-03-21 NOTE — PROGRESS NOTES
Occupational Therapy  Pt in bed asleep when checked on this afternoon.  Electronically signed by PAIGE Ramon on 3/21/2022 at 3:16 PM

## 2022-03-22 LAB
ALBUMIN SERPL-MCNC: 3.5 G/DL (ref 3.5–5.2)
ANION GAP SERPL CALCULATED.3IONS-SCNC: 11 MMOL/L (ref 7–19)
BUN BLDV-MCNC: 35 MG/DL (ref 8–23)
CALCIUM SERPL-MCNC: 10.2 MG/DL (ref 8.8–10.2)
CHLORIDE BLD-SCNC: 108 MMOL/L (ref 98–111)
CO2: 24 MMOL/L (ref 22–29)
CREAT SERPL-MCNC: 1.5 MG/DL (ref 0.5–0.9)
GFR AFRICAN AMERICAN: 40
GFR NON-AFRICAN AMERICAN: 33
GLUCOSE BLD-MCNC: 124 MG/DL (ref 74–109)
HCT VFR BLD CALC: 33.2 % (ref 37–47)
HEMOGLOBIN: 9.9 G/DL (ref 12–16)
MCH RBC QN AUTO: 26.6 PG (ref 27–31)
MCHC RBC AUTO-ENTMCNC: 29.8 G/DL (ref 33–37)
MCV RBC AUTO: 89.2 FL (ref 81–99)
PDW BLD-RTO: 16.3 % (ref 11.5–14.5)
PLATELET # BLD: 229 K/UL (ref 130–400)
PMV BLD AUTO: 10.6 FL (ref 9.4–12.3)
POTASSIUM REFLEX MAGNESIUM: 4.5 MMOL/L (ref 3.5–5)
RBC # BLD: 3.72 M/UL (ref 4.2–5.4)
SODIUM BLD-SCNC: 143 MMOL/L (ref 136–145)
WBC # BLD: 7.6 K/UL (ref 4.8–10.8)

## 2022-03-22 PROCEDURE — 2580000003 HC RX 258: Performed by: STUDENT IN AN ORGANIZED HEALTH CARE EDUCATION/TRAINING PROGRAM

## 2022-03-22 PROCEDURE — 2580000003 HC RX 258

## 2022-03-22 PROCEDURE — 6360000002 HC RX W HCPCS: Performed by: STUDENT IN AN ORGANIZED HEALTH CARE EDUCATION/TRAINING PROGRAM

## 2022-03-22 PROCEDURE — 80048 BASIC METABOLIC PNL TOTAL CA: CPT

## 2022-03-22 PROCEDURE — 2140000000 HC CCU INTERMEDIATE R&B

## 2022-03-22 PROCEDURE — 6370000000 HC RX 637 (ALT 250 FOR IP)

## 2022-03-22 PROCEDURE — 2700000000 HC OXYGEN THERAPY PER DAY

## 2022-03-22 PROCEDURE — 6370000000 HC RX 637 (ALT 250 FOR IP): Performed by: STUDENT IN AN ORGANIZED HEALTH CARE EDUCATION/TRAINING PROGRAM

## 2022-03-22 PROCEDURE — 97530 THERAPEUTIC ACTIVITIES: CPT

## 2022-03-22 PROCEDURE — 6370000000 HC RX 637 (ALT 250 FOR IP): Performed by: INTERNAL MEDICINE

## 2022-03-22 PROCEDURE — 99232 SBSQ HOSP IP/OBS MODERATE 35: CPT | Performed by: PSYCHIATRY & NEUROLOGY

## 2022-03-22 PROCEDURE — 36415 COLL VENOUS BLD VENIPUNCTURE: CPT

## 2022-03-22 PROCEDURE — 85027 COMPLETE CBC AUTOMATED: CPT

## 2022-03-22 PROCEDURE — 82040 ASSAY OF SERUM ALBUMIN: CPT

## 2022-03-22 PROCEDURE — 94640 AIRWAY INHALATION TREATMENT: CPT

## 2022-03-22 PROCEDURE — 6360000002 HC RX W HCPCS

## 2022-03-22 RX ADMIN — Medication 400 MG: at 20:20

## 2022-03-22 RX ADMIN — IPRATROPIUM BROMIDE 0.5 MG: 0.5 SOLUTION RESPIRATORY (INHALATION) at 07:08

## 2022-03-22 RX ADMIN — SODIUM CHLORIDE, PRESERVATIVE FREE 10 ML: 5 INJECTION INTRAVENOUS at 09:51

## 2022-03-22 RX ADMIN — CARVEDILOL 12.5 MG: 12.5 TABLET, FILM COATED ORAL at 09:37

## 2022-03-22 RX ADMIN — FUROSEMIDE 40 MG: 40 TABLET ORAL at 09:37

## 2022-03-22 RX ADMIN — CYANOCOBALAMIN TAB 500 MCG 1000 MCG: 500 TAB at 09:37

## 2022-03-22 RX ADMIN — ALLOPURINOL 100 MG: 100 TABLET ORAL at 09:37

## 2022-03-22 RX ADMIN — ONDANSETRON 4 MG: 2 INJECTION INTRAMUSCULAR; INTRAVENOUS at 01:49

## 2022-03-22 RX ADMIN — MEROPENEM AND SODIUM CHLORIDE 500 MG: 500 INJECTION, SOLUTION INTRAVENOUS at 11:04

## 2022-03-22 RX ADMIN — Medication 1000 UNITS: at 09:36

## 2022-03-22 RX ADMIN — CARVEDILOL 12.5 MG: 12.5 TABLET, FILM COATED ORAL at 16:48

## 2022-03-22 RX ADMIN — SODIUM CHLORIDE, PRESERVATIVE FREE 10 ML: 5 INJECTION INTRAVENOUS at 20:19

## 2022-03-22 RX ADMIN — DOCUSATE SODIUM 100 MG: 100 CAPSULE, LIQUID FILLED ORAL at 09:36

## 2022-03-22 RX ADMIN — ALLOPURINOL 100 MG: 100 TABLET ORAL at 20:20

## 2022-03-22 RX ADMIN — IPRATROPIUM BROMIDE 0.5 MG: 0.5 SOLUTION RESPIRATORY (INHALATION) at 18:57

## 2022-03-22 RX ADMIN — MEROPENEM AND SODIUM CHLORIDE 500 MG: 500 INJECTION, SOLUTION INTRAVENOUS at 23:29

## 2022-03-22 RX ADMIN — THIAMINE HYDROCHLORIDE 100 MG: 100 INJECTION, SOLUTION INTRAMUSCULAR; INTRAVENOUS at 09:36

## 2022-03-22 RX ADMIN — IPRATROPIUM BROMIDE 0.5 MG: 0.5 SOLUTION RESPIRATORY (INHALATION) at 14:24

## 2022-03-22 RX ADMIN — AMPICILLIN SODIUM 2000 MG: 2 INJECTION, POWDER, FOR SOLUTION INTRAMUSCULAR; INTRAVENOUS at 09:46

## 2022-03-22 RX ADMIN — AMPICILLIN SODIUM 2000 MG: 2 INJECTION, POWDER, FOR SOLUTION INTRAMUSCULAR; INTRAVENOUS at 20:21

## 2022-03-22 RX ADMIN — SODIUM CHLORIDE, SODIUM LACTATE, POTASSIUM CHLORIDE, CALCIUM CHLORIDE AND DEXTROSE MONOHYDRATE: 5; 600; 310; 30; 20 INJECTION, SOLUTION INTRAVENOUS at 09:20

## 2022-03-22 RX ADMIN — ATORVASTATIN CALCIUM 20 MG: 20 TABLET, FILM COATED ORAL at 09:37

## 2022-03-22 ASSESSMENT — PAIN DESCRIPTION - PROGRESSION: CLINICAL_PROGRESSION: NOT CHANGED

## 2022-03-22 ASSESSMENT — PAIN DESCRIPTION - DESCRIPTORS
DESCRIPTORS: HEADACHE
DESCRIPTORS: HEADACHE

## 2022-03-22 ASSESSMENT — PAIN SCALES - GENERAL: PAINLEVEL_OUTOF10: 0

## 2022-03-22 ASSESSMENT — PAIN DESCRIPTION - FREQUENCY
FREQUENCY: CONTINUOUS
FREQUENCY: CONTINUOUS

## 2022-03-22 ASSESSMENT — PAIN DESCRIPTION - LOCATION
LOCATION: HEAD
LOCATION: HEAD

## 2022-03-22 ASSESSMENT — PAIN SCALES - WONG BAKER
WONGBAKER_NUMERICALRESPONSE: 2
WONGBAKER_NUMERICALRESPONSE: 4

## 2022-03-22 ASSESSMENT — PAIN - FUNCTIONAL ASSESSMENT: PAIN_FUNCTIONAL_ASSESSMENT: ACTIVITIES ARE NOT PREVENTED

## 2022-03-22 ASSESSMENT — PAIN DESCRIPTION - PAIN TYPE
TYPE: ACUTE PAIN
TYPE: ACUTE PAIN

## 2022-03-22 NOTE — PROGRESS NOTES
Occupational Therapy     03/22/22 1143   Restrictions/Precautions   Restrictions/Precautions Fall Risk   Vision   Vision [facility]   Hearing   Hearing [facility]   General   Chart Reviewed Yes   Patient assessed for rehabilitation services? Yes   Response to previous treatment Patient with no complaints from previous session   Family / Caregiver Present Yes   Subjective   Subjective Pt in bed upon arrival for therapy. Pt not feeling well at this time and very lethargic but agreeable to participate. Pain Assessment   Patient Currently in Pain Yes   Pain Assessment Faces   Carrera-Suárez Pain Rating 4   Pain Type Acute pain   Pain Location Head   Pain Descriptors Headache   Pain Frequency Continuous   Clinical Progression Not changed   Response to Pain Intervention Patient Satisfied   Oxygen Therapy   O2 Device Nasal cannula   O2 Flow Rate (L/min) 2 L/min   Orientation   Overall Orientation Status X   ADL   Feeding Maximum assistance   Grooming Maximum assistance   UE Bathing Maximum assistance   LE Bathing Maximum assistance   UE Dressing Maximum assistance   LE Dressing Maximum assistance   Toileting Maximum assistance   Balance   Sitting Balance Minimal assistance   Standing Balance Unable to assess(comment)   Bed mobility   Rolling to Left Maximum assistance;2 Person assistance   Rolling to Right Maximum assistance;2 Person assistance   Supine to Sit Maximum assistance;2 Person assistance   Sit to Supine Maximum assistance;2 Person assistance   Scooting Dependent/Total;2 Person assistance   Assessment   Performance deficits / Impairments Decreased functional mobility ; Decreased ADL status; Decreased strength;Decreased safe awareness;Decreased cognition;Decreased endurance;Decreased balance;Decreased coordination;Decreased posture   Assessment The patient will need further therapy to upgrade functional status.    Treatment Diagnosis Debility, heart failure, acute encephalopathy, fall with head trauma   Prognosis Fair   REQUIRES OT FOLLOW UP Yes   Treatment Initiated  Tx focused on bed mobility, long sitting EOB and repsoitioning back in bed.     Discharge Recommendations Patient would benefit from continued therapy after discharge   Activity Tolerance   Activity Tolerance Treatment limited secondary to decreased cognition;Patient limited by pain   Safety Devices   Safety Devices in place Yes   Type of devices Bed alarm in place;Call light within reach   Plan   Times per week 3-5   Times per day Daily   Electronically signed by PAIGE Morales on 3/22/2022 at 12:15 PM

## 2022-03-22 NOTE — PROGRESS NOTES
Patient:   John Oseguera  MR#:    [id number]   Room:    [facility]-   YOB: 1940  Date of Progress Note: 3/22/2022  Time of Note                           8:36 AM  Consulting Physician:   Jonas Najera M.D. Attending Physician:  Charleen Tavera MD       CHIEF COMPLAINT: Confusion and abnormal motor movements  ? Subjective  This 79 y.o. female who was admitted on 3/13 with CHF and underwent a significant diuresis. Developed some confusion on 3/16 and fell out of bed hitting her head. CT of the head and MRI of the head showing no obvious intracranial abnormalities and no evidence of skull fracture. Due to encephalopathy a repeat UA was consistent with a urinary tract infection and she is now on Merrem. According to the nurse, she was having some intermittent myoclonic type movements but currently is having none. No recurrent neurological difficulties noted. Patient more awake and alert this morning  REVIEW OF SYSTEMS:  Constitutional: No fevers No chills  Neck:No stiffness  Respiratory: No shortness of breath  Cardiovascular: No chest pain No palpitations  Gastrointestinal: No abdominal pain    Genitourinary: No Dysuria  Neurological: No headache, no seizures      PHYSICAL EXAM:  BP (!) 113/92   Pulse 82   Temp 98.1 °F (36.7 °C) (Temporal)   Resp 20   Ht 5' 3\" (1.6 m)   Wt 168 lb 12.8 oz (76.6 kg)   SpO2 93%   BMI 29.90 kg/m²     Constitutional: she appears well-developed and well-nourished. Eyes - conjunctiva normal.  Pupils react to light  Ear, nose, throat -hearing intact to voice.  No scars, masses, or lesions over external nose or ears, no atrophy of tongue  Neck-symmetric, no masses noted, no jugular vein distension  Respiration- chest wall appears symmetric, good expansion,   normal effort without use of accessory muscles  Cardiovascular- RRR  Musculoskeletal - no significant wasting of muscles noted, no bony deformities, gait no gross ataxia  Extremities-no clubbing, cyanosis or edema  Skin - warm, dry, and intact. No rash, erythema, or pallor. Psychiatric - mood, affect, and behavior appear normal.      Neurology  NEUROLOGICAL EXAM:      Mental status   Eyes were closed but opens them to voice. Oriented x3 and following all simple commands     Cranial Nerves   CN II- Visual fields grossly unremarkable  CN III, IV,VI-EOMI, No nystagmus, conjugate eye movements, no ptosis  CN VII-no facial asymmetry  CN VIII-Hearing intact   CN IX and X- Palate elevates in midline  CN XI-good shoulder shrug  CN XII-Tongue midline with no fasciculations or fibrillations          Motor function  Antigravity x 4             Tremor None present. Asterixis noted     Gait                  Not tested           Nursing/pcp notes, imaging,labs and vitals reviewed. PT,OT and/or speech notes reviewed    Lab Results   Component Value Date    WBC 7.6 03/22/2022    HGB 9.9 (L) 03/22/2022    HCT 33.2 (L) 03/22/2022    MCV 89.2 03/22/2022     03/22/2022     Lab Results   Component Value Date     03/22/2022    K 4.5 03/22/2022     03/22/2022    CO2 24 03/22/2022    BUN 35 (H) 03/22/2022    CREATININE 1.5 (H) 03/22/2022    GLUCOSE 124 (H) 03/22/2022    CALCIUM 10.2 03/22/2022    PROT 7.5 03/13/2022    LABALBU 3.5 03/22/2022    BILITOT 0.7 03/13/2022    ALKPHOS 90 03/13/2022    AST 17 03/13/2022    ALT 11 03/13/2022    LABGLOM 33 (A) 03/22/2022    GFRAA 40 (L) 03/22/2022     Lab Results   Component Value Date    INR 1.86 (H) 03/13/2022    INR 1.05 12/31/2021    INR 0.97 11/22/2021   No results found for: PHENYTOIN, ESR, CRP          RECORD REVIEW: Previous medical records, medications were reviewed at today's visit    IMPRESSION:   1. Elderly woman with CHF, encephalopathy. Suspect the latter is multifactorial and related to UTI and probable postconcussive syndrome. EEG showing no sign of ongoing activity. Normal ammonia level. MRI of the head showing no evidence of brain injury/infarction. Continue supportive care and avoid sedating medications. Would change Merrem to a different antibiotic as the former can sometimes cause encephalopathy. Overall, I feel her encephalopathy has significantly improved. She has asterixis of unknown cause at this time. Possibly related to [address] [place] [address]. 2.  A. fib-Coreg/holding Xarelto  3.   JENNA on CKD-continue to monitor    PT/OT  Needs nutrition

## 2022-03-22 NOTE — CONSULTS
Pt with EF 15-20% 3/13/22. BNP 12,185 continues with elevated creatinine 1.6. Dr Kin Wallace signed off at this time as stable from cardiology standpoint. Pt found with UTI / encephalopathy remains confused. CHF teaching is not an option at this time due to status.

## 2022-03-22 NOTE — PROGRESS NOTES
Daily Progress Note    Date:3/22/2022  Patient: Shahida Stratton  : 1940  [id number]  CODE:DNR No additional code details  PCP:Delonte Moreno    Admit Date: 3/13/2022  3:10 PM   LOS: 9 days         Subjective:    More awake and alert this morning but still with intermittent confusion. No abnormal myoclonic movement noted this AM.  No fevers or chills overnight. Continues to have adequate urine output with significant net negative fluid balance.           Review of Systems  Unable to complete comprehensive ROS due to AMS        Objective:      Vital signs in last 24 hours:  Patient Vitals for the past 24 hrs:   BP Temp Temp src Pulse Resp SpO2 Weight   22 1409 107/60 97.4 °F (36.3 °C) Temporal 80 20 96 % --   22 0937 -- -- -- 77 -- -- --   22 0555 (!) 113/92 98.1 °F (36.7 °C) Temporal 82 20 93 % 168 lb 12.8 oz (76.6 kg)   22 0001 129/67 97.3 °F (36.3 °C) Temporal 80 24 91 % --   22 1755 (!) 125/57 -- -- 82 14 90 % --     Physical exam    General: Elderly frail-appearing female, fatigued appearing, no distress  HEENT: areas of soft tissue swelling/superficial hematoma on scalp in healing stage  Cardiovascular: Normal rate, regular rhythm, pulses 2+ and equal  Respiratory: Diminished breath sounds bilaterally without wheezes or rales, no increased work of breathing  Abdomen: Soft, nontender, bowel sounds present  Neurologic: arousable to verbal stimuli, confused  Musculoskeletal: Loss of subcutaneous fat, appears malnourished  Extremities: No clubbing or cyanosis  Skin: Warm and dry        Lab Review   Recent Results (from the past 24 hour(s))   CBC    Collection Time: 22  1:37 AM   Result Value Ref Range    WBC 7.6 4.8 - 10.8 K/uL    RBC 3.72 (L) 4.20 - 5.40 M/uL    Hemoglobin 9.9 (L) 12.0 - 16.0 g/dL    Hematocrit 33.2 (L) 37.0 - 47.0 %    MCV 89.2 81.0 - 99.0 fL    MCH 26.6 (L) 27.0 - 31.0 pg    MCHC 29.8 (L) 33.0 - 37.0 g/dL    RDW 16.3 (H) 11.5 - 14.5 % Platelets 698 718 - 845 K/uL    MPV 10.6 9.4 - 12.3 fL   Basic Metabolic Panel w/ Reflex to MG    Collection Time: 03/22/22  1:37 AM   Result Value Ref Range    Sodium 143 136 - 145 mmol/L    Potassium reflex Magnesium 4.5 3.5 - 5.0 mmol/L    Chloride 108 98 - 111 mmol/L    CO2 24 22 - 29 mmol/L    Anion Gap 11 7 - 19 mmol/L    Glucose 124 (H) 74 - 109 mg/dL    BUN 35 (H) 8 - 23 mg/dL    CREATININE 1.5 (H) 0.5 - 0.9 mg/dL    GFR Non- 33 (A) >60    GFR  40 (L) >59    Calcium 10.2 8.8 - 10.2 mg/dL   Albumin    Collection Time: 03/22/22  1:37 AM   Result Value Ref Range    Albumin 3.5 3.5 - 5.2 g/dL       I/O last 3 completed shifts:   In: 3074.9 [P.O.:60; I.V.:2914.9; IV Piggyback:100]  Out: 2070 [Urine:2070]  I/O this shift:  In: 240 [P.O.:240]  Out: -       Current Facility-Administered Medications:     ampicillin 2000 mg ivpb mini bag, 2,000 mg, IntraVENous, 2 times per day, Maged Walker MD, Stopped at 03/22/22 1100    ipratropium (ATROVENT) 0.02 % nebulizer solution 0.5 mg, 0.5 mg, Nebulization, TID, Maged Walker MD, 0.5 mg at 03/22/22 1424    [START ON 3/27/2022] Levothyroxine Sodium (SYNTHROID) 100 MCG/5ML injection 76 mcg, 76 mcg, IntraVENous, Daily, Maged Walker MD    thiamine (B-1) injection 100 mg, 100 mg, IntraVENous, Daily, Maged Walker MD, 100 mg at 03/22/22 0936    metoprolol (LOPRESSOR) injection 5 mg, 5 mg, IntraVENous, Q6H PRN, Maged Walker MD    magic butt cream, , Topical, Q4H PRN, Maged Walker MD, Given at 03/21/22 0307    meropenem (MERREM) 500 mg in sodium chloride 0.9% 50 mL (duplex), 500 mg, IntraVENous, Q12H, Maged Walker MD, Stopped at 03/22/22 1135    dextrose 5 % in lactated ringers infusion, , IntraVENous, Continuous, Maged Walker MD, Last Rate: 75 mL/hr at 03/22/22 0920, New Bag at 03/22/22 0920    polyethylene glycol (GLYCOLAX) packet 17 g, 17 g, Oral, Daily, Maged Walker MD, 17 g at 03/20/22 1107    docusate sodium (COLACE) capsule 100 mg, 100 mg, Oral, Daily, Justino Moraes MD, 100 mg at 03/22/22 0936    [Held by provider] levothyroxine (SYNTHROID) tablet 112 mcg, 112 mcg, Oral, Daily, Justino Moraes MD, 112 mcg at 03/20/22 0818    furosemide (LASIX) tablet 40 mg, 40 mg, Oral, Daily, Jackie Hung MD, 40 mg at 03/22/22 3724    [Held by provider] rivaroxaban (XARELTO) tablet 15 mg, 15 mg, Oral, Dinner, Jackie Hung MD, 15 mg at 03/15/22 2127    sodium chloride flush 0.9 % injection 5-40 mL, 5-40 mL, IntraVENous, 2 times per day, GIAN Christie - CNP, 10 mL at 03/22/22 0951    sodium chloride flush 0.9 % injection 5-40 mL, 5-40 mL, IntraVENous, PRN, Oliver Brooke APRN - CNP    0.9 % sodium chloride infusion, 25 mL, IntraVENous, PRN, Oliver Brooke APRN - CNP    ondansetron (ZOFRAN-ODT) disintegrating tablet 4 mg, 4 mg, Oral, Q8H PRN **OR** ondansetron (ZOFRAN) injection 4 mg, 4 mg, IntraVENous, Q6H PRN, Oliver Brooke APRN - CNP, 4 mg at 03/22/22 0149    acetaminophen (TYLENOL) tablet 650 mg, 650 mg, Oral, Q6H PRN, 650 mg at 03/20/22 1141 **OR** acetaminophen (TYLENOL) suppository 650 mg, 650 mg, Rectal, Q6H PRN, Oliver Brooke APRN - CNP    allopurinol (ZYLOPRIM) tablet 100 mg, 100 mg, Oral, BID, GIAN Christie - CNP, 100 mg at 03/22/22 7035    [Held by provider] amLODIPine (NORVASC) tablet 5 mg, 5 mg, Oral, Daily, GIAN Christie - CNP, 5 mg at 03/17/22 0827    [Held by provider] aspirin EC tablet 81 mg, 81 mg, Oral, Daily, Oliver Brooke APRN - CNP, 81 mg at 03/16/22 0933    atorvastatin (LIPITOR) tablet 20 mg, 20 mg, Oral, Daily, GIAN Christie CNP, 20 mg at 03/22/22 8176    carvedilol (COREG) tablet 12.5 mg, 12.5 mg, Oral, BID WC, Justino Moraes MD, 12.5 mg at 03/22/22 0296    vitamin B-12 (CYANOCOBALAMIN) tablet 1,000 mcg, 1,000 mcg, Oral, Daily, GIAN Christie CNP, 1,000 mcg at 03/22/22 0200    Vitamin D (CHOLECALCIFEROL) tablet 1,000 Units, 1,000 Units, Oral, Daily, Bakari Ryan, APRN - CNP, 1,000 Units at 03/22/22 1599    [Held by provider] trospium (SANCTURA) tablet 20 mg, 20 mg, Oral, Nightly, Bakari Ryan, APRN - CNP, 20 mg at 03/15/22 2127    loperamide (IMODIUM) capsule 2 mg, 2 mg, Oral, 4x Daily PRN, Bakari Ryan, APRN - CNP, 2 mg at 03/21/22 0256    magnesium oxide (MAG-OX) tablet 400 mg, 400 mg, Oral, BID, Bakari Ryan, APRN - CNP, 400 mg at 03/20/22 1108    [Held by provider] isosorbide mononitrate (IMDUR) extended release tablet 30 mg, 30 mg, Oral, Daily, Bakari Ryan, APRN - CNP, 30 mg at 03/18/22 0945    [Held by provider] spironolactone (ALDACTONE) tablet 25 mg, 25 mg, Oral, Daily, Makenzie Solis MD, 25 mg at 03/18/22 0953    [Held by provider] sacubitril-valsartan (ENTRESTO) 24-26 MG per tablet 1 tablet, 1 tablet, Oral, BID, Makenzie Solis MD, 1 tablet at 03/18/22 0948          Assessment and Plan  Principal Problem:    Acute on chronic systolic heart failure, NYHA class 4 ([facility] [place] [address]  Active Problems:    JENNA (acute kidney injury) ([facility] [place] [address]    UTI due to extended-spectrum beta lactamase (ESBL) producing Escherichia coli    Encephalopathy due to infection    Enterococcus UTI    Chronic atrial fibrillation (HCC)    Hypercalcemia    Coronary artery disease involving native coronary artery of native heart without angina pectoris    Generalized weakness    Hypothyroidism    Spasms of the hands or feet  Resolved Problems:    * No resolved hospital problems. [facility] Course: Patient was admitted on 3/13 with worsening SOB and leg swelling. She was recently diagnosed with systolic CHF and was started on entresto on her previous admission with plans to repeat a TTE at the end of march for possible ICD placement. Admitted with acute systolic heart failure in setting of markedly elevated proBNP and JENNA suspected related to cardiorenal syndrome.   She received diuresis with IV lasix and aldactone on this admission with significant net negative fluid balance, improvement. Evaluated by cardiology with recommendation to follow-up outpatient to arrange ICD placement. Repeat TTE inpatient revealed EF 25-30%. Patient subsequently transitioned to oral Lasix and will continue on Entresto, Aldactone, and Coreg. Hospital course complicated after patient developed worsening mental status with confusion, then on 3/16 patient fell out of bed with resultant right elbow and head trauma. No evidence of fracture on plain films of elbow. CT head with no intracranial bleeding or other abnormality aside from mild scalp soft tissue swelling in right frontal and parietal region. No evidence of skull fracture. Given head trauma, Xarelto and aspirin were held. Noted to have inappropriate elevated TSH, so increased Synthroid. Obtained MRI brain on 3/17 which revealed chronic ischemic changes with atrophy but no acute findings otherwise. Due to encephalopathy, repeat UA which showed active urine sediment consistent with UTI. Urine culture speciated ESBL E. coli and Enterococcus, with susceptibilities reviewed, antibiotics switched to meropenem and amoxicillin. Patient was noted to have some occasional myoclonic movement, given concern for acute change, repeated CT head which showed no interval change. EEG obtained and neurology consulted. Noted to have worsening hypercalcemia during hospital course with work-up initiated including PTH. Treated hypercalcemia with calcitonin.             Acute systolic heart failure  -Improved, continue oral diuretic  --continue coreg  --Temporarily held Entresto and Aldactone due to renal function and BP intermittently on lower side, will resume as appropriate  - TTE shows EF of 25-30%, no significant improvement from previous   -Cardiology recommendations noted-outpatient cardiology follow-up to arrange ICD placement  -Will need LifeVest on discharge    Acute encephalopathy, multifactorial with infection and UTI, possibly metabolic with JENNA/hypercalcemia, possible postconcussive syndrome from fall/head trauma  --Speech evaluation  -Neuroimaging reviewed  -Continue neurochecks with low threshold for repeat CT head for any acute changes  -Avoid/limit sedating meds  --Neurology following    UTI with ESBL E. coli and Enterococcus from urine culture  Urine culture susceptibilities reviewed  -Continue treatment with meropenem for ESBL E. coli  -Treat Enterococcus with amoxicillin    Fall with head trauma, superficial hematoma  --Continue to hold aspirin and Xarelto    Hypercalcemia  -Follow ionized calcium, no evidence of hyperparathyroidism, normal vitamin D and phosphorus, PTHrP lab sent  -Improved with dose of calcitonin    JENNA on CKD, suspect cardiorenal syndrome  -Stable, monitor urine output and labs, avoid nephrotoxic agents and hypotension    Poor oral intake with AMS  Continue IV fluid at low rate    A-fib  -Rate controlled, if unable to take p.o.  Coreg may add IV Lopressor as needed  --Xarelto held following fall with head trauma    HTN  -Monitor, as needed IV antihypertensives if uncontrolled    hypothyroidism  -Inappropriately elevated TSH, continue Synthroid at increased dose, switch to IV dosing if unable to take p.o., outpatient follow-up with repeat TSH in 6 weeks    Debility  [facility] at [facility]  Continue PT/OT      disposition TBD pending clinical improvement of above issues, plan to discharge to rehab facility when appropriate        Justino Moraes MD 3/22/2022 4:35 PM

## 2022-03-23 PROBLEM — S00.93XA TRAUMATIC HEMATOMA OF HEAD: Status: ACTIVE | Noted: 2022-03-23

## 2022-03-23 LAB
ALBUMIN SERPL-MCNC: 3.6 G/DL (ref 3.5–5.2)
ANION GAP SERPL CALCULATED.3IONS-SCNC: 11 MMOL/L (ref 7–19)
BUN BLDV-MCNC: 43 MG/DL (ref 8–23)
CALCIUM SERPL-MCNC: 9.9 MG/DL (ref 8.8–10.2)
CHLORIDE BLD-SCNC: 106 MMOL/L (ref 98–111)
CO2: 24 MMOL/L (ref 22–29)
CREAT SERPL-MCNC: 1.6 MG/DL (ref 0.5–0.9)
GFR AFRICAN AMERICAN: 37
GFR NON-AFRICAN AMERICAN: 31
GLUCOSE BLD-MCNC: 135 MG/DL (ref 74–109)
HCT VFR BLD CALC: 31.8 % (ref 37–47)
HEMOGLOBIN: 9.3 G/DL (ref 12–16)
MCH RBC QN AUTO: 27 PG (ref 27–31)
MCHC RBC AUTO-ENTMCNC: 29.2 G/DL (ref 33–37)
MCV RBC AUTO: 92.2 FL (ref 81–99)
PDW BLD-RTO: 16.4 % (ref 11.5–14.5)
PLATELET # BLD: 194 K/UL (ref 130–400)
PMV BLD AUTO: 10.8 FL (ref 9.4–12.3)
POTASSIUM REFLEX MAGNESIUM: 4.7 MMOL/L (ref 3.5–5)
RBC # BLD: 3.45 M/UL (ref 4.2–5.4)
SODIUM BLD-SCNC: 141 MMOL/L (ref 136–145)
WBC # BLD: 6.3 K/UL (ref 4.8–10.8)

## 2022-03-23 PROCEDURE — 99233 SBSQ HOSP IP/OBS HIGH 50: CPT | Performed by: PSYCHIATRY & NEUROLOGY

## 2022-03-23 PROCEDURE — 6360000002 HC RX W HCPCS: Performed by: STUDENT IN AN ORGANIZED HEALTH CARE EDUCATION/TRAINING PROGRAM

## 2022-03-23 PROCEDURE — 85027 COMPLETE CBC AUTOMATED: CPT

## 2022-03-23 PROCEDURE — 2580000003 HC RX 258

## 2022-03-23 PROCEDURE — 94640 AIRWAY INHALATION TREATMENT: CPT

## 2022-03-23 PROCEDURE — 6370000000 HC RX 637 (ALT 250 FOR IP): Performed by: INTERNAL MEDICINE

## 2022-03-23 PROCEDURE — 2580000003 HC RX 258: Performed by: STUDENT IN AN ORGANIZED HEALTH CARE EDUCATION/TRAINING PROGRAM

## 2022-03-23 PROCEDURE — 6370000000 HC RX 637 (ALT 250 FOR IP)

## 2022-03-23 PROCEDURE — 92522 EVALUATE SPEECH PRODUCTION: CPT

## 2022-03-23 PROCEDURE — 80048 BASIC METABOLIC PNL TOTAL CA: CPT

## 2022-03-23 PROCEDURE — 6370000000 HC RX 637 (ALT 250 FOR IP): Performed by: STUDENT IN AN ORGANIZED HEALTH CARE EDUCATION/TRAINING PROGRAM

## 2022-03-23 PROCEDURE — 92610 EVALUATE SWALLOWING FUNCTION: CPT

## 2022-03-23 PROCEDURE — 2140000000 HC CCU INTERMEDIATE R&B

## 2022-03-23 PROCEDURE — 82040 ASSAY OF SERUM ALBUMIN: CPT

## 2022-03-23 PROCEDURE — 97530 THERAPEUTIC ACTIVITIES: CPT

## 2022-03-23 PROCEDURE — 36415 COLL VENOUS BLD VENIPUNCTURE: CPT

## 2022-03-23 PROCEDURE — 2700000000 HC OXYGEN THERAPY PER DAY

## 2022-03-23 RX ADMIN — ALLOPURINOL 100 MG: 100 TABLET ORAL at 08:29

## 2022-03-23 RX ADMIN — AMPICILLIN SODIUM 2000 MG: 2 INJECTION, POWDER, FOR SOLUTION INTRAMUSCULAR; INTRAVENOUS at 21:08

## 2022-03-23 RX ADMIN — ATORVASTATIN CALCIUM 20 MG: 20 TABLET, FILM COATED ORAL at 08:29

## 2022-03-23 RX ADMIN — IPRATROPIUM BROMIDE 0.5 MG: 0.5 SOLUTION RESPIRATORY (INHALATION) at 19:05

## 2022-03-23 RX ADMIN — IPRATROPIUM BROMIDE 0.5 MG: 0.5 SOLUTION RESPIRATORY (INHALATION) at 15:02

## 2022-03-23 RX ADMIN — CARVEDILOL 12.5 MG: 12.5 TABLET, FILM COATED ORAL at 08:28

## 2022-03-23 RX ADMIN — CYANOCOBALAMIN TAB 500 MCG 1000 MCG: 500 TAB at 08:28

## 2022-03-23 RX ADMIN — ALLOPURINOL 100 MG: 100 TABLET ORAL at 21:08

## 2022-03-23 RX ADMIN — Medication 400 MG: at 21:08

## 2022-03-23 RX ADMIN — SODIUM CHLORIDE, PRESERVATIVE FREE 10 ML: 5 INJECTION INTRAVENOUS at 08:29

## 2022-03-23 RX ADMIN — MEROPENEM AND SODIUM CHLORIDE 500 MG: 500 INJECTION, SOLUTION INTRAVENOUS at 09:45

## 2022-03-23 RX ADMIN — Medication 1000 UNITS: at 08:28

## 2022-03-23 RX ADMIN — FUROSEMIDE 40 MG: 40 TABLET ORAL at 08:28

## 2022-03-23 RX ADMIN — AMPICILLIN SODIUM 2000 MG: 2 INJECTION, POWDER, FOR SOLUTION INTRAMUSCULAR; INTRAVENOUS at 08:27

## 2022-03-23 RX ADMIN — THIAMINE HYDROCHLORIDE 100 MG: 100 INJECTION, SOLUTION INTRAMUSCULAR; INTRAVENOUS at 08:28

## 2022-03-23 RX ADMIN — Medication 400 MG: at 08:28

## 2022-03-23 RX ADMIN — DOCUSATE SODIUM 100 MG: 100 CAPSULE, LIQUID FILLED ORAL at 08:29

## 2022-03-23 RX ADMIN — MEROPENEM AND SODIUM CHLORIDE 500 MG: 500 INJECTION, SOLUTION INTRAVENOUS at 22:12

## 2022-03-23 RX ADMIN — IPRATROPIUM BROMIDE 0.5 MG: 0.5 SOLUTION RESPIRATORY (INHALATION) at 06:31

## 2022-03-23 ASSESSMENT — PAIN SCALES - GENERAL
PAINLEVEL_OUTOF10: 0
PAINLEVEL_OUTOF10: 0

## 2022-03-23 ASSESSMENT — PAIN DESCRIPTION - PAIN TYPE: TYPE: ACUTE PAIN

## 2022-03-23 ASSESSMENT — PAIN SCALES - WONG BAKER
WONGBAKER_NUMERICALRESPONSE: 6
WONGBAKER_NUMERICALRESPONSE: 0

## 2022-03-23 ASSESSMENT — PAIN DESCRIPTION - LOCATION: LOCATION: GENERALIZED

## 2022-03-23 NOTE — PLAN OF CARE
Nutrition Problem #1: Inadequate oral intake,Altered nutrition-related lab values  Intervention: Food and/or Nutrient Delivery: Continue NPO  Nutritional Goals: Po intake >75%, reduced/improved edema

## 2022-03-23 NOTE — PLAN OF CARE
Problem: Falls - Risk of:  Goal: Will remain free from falls  Description: Will remain free from falls  Outcome: Ongoing  Goal: Absence of physical injury  Description: Absence of physical injury  Outcome: Ongoing     Problem: OXYGENATION/RESPIRATORY FUNCTION  Goal: Patient will maintain patent airway  Outcome: Ongoing  Goal: Patient will achieve/maintain normal respiratory rate/effort  Description: Respiratory rate and effort will be within normal limits for the patient  Outcome: Ongoing     Problem: HEMODYNAMIC STATUS  Goal: Patient has stable vital signs and fluid balance  Outcome: Ongoing     Problem: FLUID AND ELECTROLYTE IMBALANCE  Goal: Fluid and electrolyte balance are achieved/maintained  Outcome: Ongoing     Problem: ACTIVITY INTOLERANCE/IMPAIRED MOBILITY  Goal: Mobility/activity is maintained at optimum level for patient  Outcome: Ongoing     Problem: Nutrition  Goal: Optimal nutrition therapy  3/23/2022 1420 by Sina Maria RN  Outcome: Ongoing  3/23/2022 1340 by Sammie Orr MS, RD, LD  Outcome: Ongoing     Problem: Pain:  Goal: Pain level will decrease  Description: Pain level will decrease  Outcome: Ongoing  Goal: Control of acute pain  Description: Control of acute pain  Outcome: Ongoing  Goal: Control of chronic pain  Description: Control of chronic pain  Outcome: Ongoing     Problem: Skin Integrity:  Goal: Will show no infection signs and symptoms  Description: Will show no infection signs and symptoms  Outcome: Ongoing  Goal: Absence of new skin breakdown  Description: Absence of new skin breakdown  Outcome: Ongoing

## 2022-03-23 NOTE — PROGRESS NOTES
Comprehensive Nutrition Assessment    Type and Reason for Visit:  Reassess    Nutrition Recommendations/Plan: follow for SLP recommendations or possible initiation of EN    Nutrition Assessment:  PO intake has continued to be poor with intake ranging 0-25%. SLP evaluated for safe swallow--pt now NPO. Aware possbile discussion about tube feeding has been started    Malnutrition Assessment:  Malnutrition Status: At risk for malnutrition (Comment)    Context:  Chronic Illness     Findings of the 6 clinical characteristics of malnutrition:  Energy Intake:  Mild decrease in energy intake (Comment)  Weight Loss:  No significant weight loss     Body Fat Loss:  No significant body fat loss     Muscle Mass Loss:  No significant muscle mass loss    Fluid Accumulation:  1 - Mild Extremities,Generalized   Strength:  Not Performed    Estimated Daily Nutrient Needs:  Energy (kcal):  6279-4597 kcal/d (20-25 kcal/kg); Weight Used for Energy Requirements:  Current     Protein (g):   g/d (1.3-2 g/kg IBW); Weight Used for Protein Requirements:  Ideal        Fluid (ml/day):  1033-6334 ml/d; Method Used for Fluid Requirements:  1 ml/kcal      Nutrition Related Findings:  +1 edema      Wounds:  Skin Tears       Current Nutrition Therapies:    Diet NPO    Anthropometric Measures:  · Height: 5' 3\" (160 cm)  · Current Body Weight: 169 lb 14.4 oz (77.1 kg)   · Admission Body Weight: 169 lb (76.7 kg)    · Usual Body Weight: 155 lb (70.3 kg) (1/25/2022)     · Ideal Body Weight: 115 lbs; % Ideal Body Weight 147.7 %   · BMI: 30.1  · Adjusted Body Weight:  ; No Adjustment   · BMI Categories: Obese Class 1 (BMI 30.0-34. 9)       Nutrition Diagnosis:   · Inadequate oral intake,Altered nutrition-related lab values related to swallowing difficulty,cardiac dysfunction,renal dysfunction as evidenced by swallow study results,lab values,localized or generalized fluid accumulation      Nutrition Interventions:   Food and/or Nutrient Delivery:  Continue NPO  Nutrition Education/Counseling:  Education completed   Coordination of Nutrition Care:  Continue to monitor while inpatient    Goals:  Po intake >75%, reduced/improved edema       Nutrition Monitoring and Evaluation:   Behavioral-Environmental Outcomes:  None Identified   Food/Nutrient Intake Outcomes:  Diet Advancement/Tolerance,Food and Nutrient Intake,Enteral Nutrition Intake/Tolerance  Physical Signs/Symptoms Outcomes:  Biochemical Data,Chewing or Swallowing,Fluid Status or Edema,Weight,Skin,Nutrition Focused Physical Findings     Discharge Planning:     Too soon to determine     Electronically signed by Dhiraj Reveles MS, RD, LD on 3/23/22 at 1:39 PM CDT    Contact:[phone]

## 2022-03-23 NOTE — PROGRESS NOTES
Daily Progress Note    Date:3/23/2022  Patient: Alanna Eller  : 1940  [facility]:[id number]  CODE:DNR No additional code details  PCP:Delonte Ontiveros    Admit Date: 3/13/2022  3:10 PM   LOS: 10 days         Subjective:    Patient remains intermittently somnolent and confused. More awake at times. This a.m. unable to pass swallow evaluation with speech therapy. No fevers or chills overnight. Breathing comfortably without shortness of breath.       Review of Systems  Unable to complete comprehensive ROS due to AMS        Objective:      Vital signs in last 24 hours:  Patient Vitals for the past 24 hrs:   BP Temp Temp src Pulse Resp SpO2 Height Weight   22 1332 -- -- -- -- -- -- 5' 3\" (1.6 m) --   22 1000 116/78 97.3 °F (36.3 °C) Temporal 89 18 93 % -- --   22 0515 -- -- -- -- -- -- -- 169 lb 14.4 oz (77.1 kg)   22 0030 120/81 97.1 °F (36.2 °C) Temporal 83 17 94 % -- --   22 2000 121/83 97.3 °F (36.3 °C) Temporal 83 19 96 % -- --   22 1720 130/82 97.5 °F (36.4 °C) Temporal 92 18 98 % -- --   22 1648 -- -- -- 80 -- -- -- --     Physical exam    General: Elderly frail-appearing female, fatigued appearing, no distress  HEENT: areas of soft tissue swelling/superficial hematoma on scalp in healing stage  Cardiovascular: Normal rate, regular rhythm, pulses 2+ and equal  Respiratory: Diminished breath sounds bilaterally without wheezes or rales, no increased work of breathing  Abdomen: Soft, nontender, bowel sounds present  Neurologic: arousable to verbal stimuli, confused  Musculoskeletal: Loss of subcutaneous fat, appears malnourished  Extremities: No clubbing or cyanosis  Skin: Warm and dry        Lab Review   Recent Results (from the past 24 hour(s))   CBC    Collection Time: 22  3:15 AM   Result Value Ref Range    WBC 6.3 4.8 - 10.8 K/uL    RBC 3.45 (L) 4.20 - 5.40 M/uL    Hemoglobin 9.3 (L) 12.0 - 16.0 g/dL    Hematocrit 31.8 (L) 37.0 - 47.0 %    MCV 92.2 81.0 - 99.0 fL    MCH 27.0 27.0 - 31.0 pg    MCHC 29.2 (L) 33.0 - 37.0 g/dL    RDW 16.4 (H) 11.5 - 14.5 %    Platelets 773 545 - 115 K/uL    MPV 10.8 9.4 - 12.3 fL   Basic Metabolic Panel w/ Reflex to MG    Collection Time: 03/23/22  3:15 AM   Result Value Ref Range    Sodium 141 136 - 145 mmol/L    Potassium reflex Magnesium 4.7 3.5 - 5.0 mmol/L    Chloride 106 98 - 111 mmol/L    CO2 24 22 - 29 mmol/L    Anion Gap 11 7 - 19 mmol/L    Glucose 135 (H) 74 - 109 mg/dL    BUN 43 (H) 8 - 23 mg/dL    CREATININE 1.6 (H) 0.5 - 0.9 mg/dL    GFR Non- 31 (A) >60    GFR  37 (L) >59    Calcium 9.9 8.8 - 10.2 mg/dL   Albumin    Collection Time: 03/23/22  3:15 AM   Result Value Ref Range    Albumin 3.6 3.5 - 5.2 g/dL       I/O last 3 completed shifts: In: 390 [P.O.:390]  Out: 1000 [Urine:1000]  No intake/output data recorded.       Current Facility-Administered Medications:     ampicillin 2000 mg ivpb mini bag, 2,000 mg, IntraVENous, 2 times per day, Henri Villa MD, Stopped at 03/23/22 8803    ipratropium (ATROVENT) 0.02 % nebulizer solution 0.5 mg, 0.5 mg, Nebulization, TID, Henri Villa MD, 0.5 mg at 03/23/22 1502    [START ON 3/27/2022] Levothyroxine Sodium (SYNTHROID) 100 MCG/5ML injection 76 mcg, 76 mcg, IntraVENous, Daily, Henri Villa MD    thiamine (B-1) injection 100 mg, 100 mg, IntraVENous, Daily, Henri Villa MD, 100 mg at 03/23/22 8427    metoprolol (LOPRESSOR) injection 5 mg, 5 mg, IntraVENous, Q6H PRN, Henri Villa MD    magic butt cream, , Topical, Q4H PRN, Henri Villa MD, Given at 03/21/22 0307    meropenem (MERREM) 500 mg in sodium chloride 0.9% 50 mL (duplex), 500 mg, IntraVENous, Q12H, Henri Villa MD, Stopped at 03/23/22 1110    dextrose 5 % in lactated ringers infusion, , IntraVENous, Continuous, Henri Villa MD, Last Rate: 75 mL/hr at 03/22/22 0920, New Bag at 03/22/22 0920    polyethylene glycol (GLYCOLAX) packet 17 g, 17 g, Oral, Daily, Swati Kwan MD, 17 g at 03/20/22 1107    docusate sodium (COLACE) capsule 100 mg, 100 mg, Oral, Daily, Swati Kwan MD, 100 mg at 03/23/22 6629    [Held by provider] levothyroxine (SYNTHROID) tablet 112 mcg, 112 mcg, Oral, Daily, Swati Kwan MD, 112 mcg at 03/20/22 0818    furosemide (LASIX) tablet 40 mg, 40 mg, Oral, Daily, Fiordaliza Mao MD, 40 mg at 03/23/22 9826    [Held by provider] rivaroxaban (XARELTO) tablet 15 mg, 15 mg, Oral, Dinner, Fiordaliza Mao MD, 15 mg at 03/15/22 2127    sodium chloride flush 0.9 % injection 5-40 mL, 5-40 mL, IntraVENous, 2 times per day, Tiesha Grover APRN - CNP, 10 mL at 03/23/22 0829    sodium chloride flush 0.9 % injection 5-40 mL, 5-40 mL, IntraVENous, PRN, Tiesha Grover APRN - CNP    0.9 % sodium chloride infusion, 25 mL, IntraVENous, PRN, Tiesha Grover, APRN - CNP    ondansetron (ZOFRAN-ODT) disintegrating tablet 4 mg, 4 mg, Oral, Q8H PRN **OR** ondansetron (ZOFRAN) injection 4 mg, 4 mg, IntraVENous, Q6H PRN, Tiesha Grover, APRN - CNP, 4 mg at 03/22/22 0149    acetaminophen (TYLENOL) tablet 650 mg, 650 mg, Oral, Q6H PRN, 650 mg at 03/20/22 1141 **OR** acetaminophen (TYLENOL) suppository 650 mg, 650 mg, Rectal, Q6H PRN, Tiesha Grover, APRN - CNP    allopurinol (ZYLOPRIM) tablet 100 mg, 100 mg, Oral, BID, Tiesha Grover, APRN - CNP, 100 mg at 03/23/22 6158    [Held by provider] amLODIPine (NORVASC) tablet 5 mg, 5 mg, Oral, Daily, Tiesha Grover APRN - CNP, 5 mg at 03/17/22 0827    [Held by provider] aspirin EC tablet 81 mg, 81 mg, Oral, Daily, GIAN Claudio CNP, 81 mg at 03/16/22 0933    atorvastatin (LIPITOR) tablet 20 mg, 20 mg, Oral, Daily, GIAN Claudio CNP, 20 mg at 03/23/22 9912    carvedilol (COREG) tablet 12.5 mg, 12.5 mg, Oral, BID WC, Swati Kwan MD, 12.5 mg at 03/23/22 3194    vitamin B-12 (CYANOCOBALAMIN) tablet 1,000 mcg, 1,000 mcg, Oral, Daily, GIAN Claudio CNP, 1,000 mcg at 03/23/22 3800    Vitamin D (CHOLECALCIFEROL) tablet 1,000 Units, 1,000 Units, Oral, Daily, Oliver Brooke APRFLAKO - CNP, 1,000 Units at 03/23/22 2512    [Held by provider] trospium (SANCTURA) tablet 20 mg, 20 mg, Oral, Nightly, Oliver Brooke APRFLAKO - CNP, 20 mg at 03/15/22 2127    loperamide (IMODIUM) capsule 2 mg, 2 mg, Oral, 4x Daily PRN, Oliver Brooke APRFLAKO - CNP, 2 mg at 03/21/22 0256    magnesium oxide (MAG-OX) tablet 400 mg, 400 mg, Oral, BID, Oliver Brooke APRFLAKO - CNP, 400 mg at 03/23/22 0152    [Held by provider] isosorbide mononitrate (IMDUR) extended release tablet 30 mg, 30 mg, Oral, Daily, Oliver Brooke APRFLAKO - CNP, 30 mg at 03/18/22 0945    [Held by provider] spironolactone (ALDACTONE) tablet 25 mg, 25 mg, Oral, Daily, Justino Moraes MD, 25 mg at 03/18/22 0953    [Held by provider] sacubitril-valsartan (ENTRESTO) 24-26 MG per tablet 1 tablet, 1 tablet, Oral, BID, Justino Moraes MD, 1 tablet at 03/18/22 0948          Assessment and Plan  Principal Problem:    Acute on chronic systolic heart failure, NYHA class 4 (HCC)  Active Problems:    JENNA (acute kidney injury) ([address]    UTI due to extended-spectrum beta lactamase (ESBL) producing Escherichia coli    Encephalopathy due to infection    Enterococcus UTI    Generalized weakness    Hypothyroidism    Chronic atrial fibrillation (HCC)    Hypercalcemia    Traumatic hematoma of head    Coronary artery disease involving native coronary artery of native heart without angina pectoris    Spasms of the hands or feet  Resolved Problems:    * No resolved hospital problems. [facility] Course: Patient was admitted on 3/13 with worsening SOB and leg swelling. She was recently diagnosed with systolic CHF and was started on entresto on her previous admission with plans to repeat a TTE at the end of march for possible ICD placement. Admitted with acute systolic heart failure in setting of markedly elevated proBNP and JENNA suspected related to cardiorenal syndrome. She received diuresis with IV lasix and aldactone on this admission with significant net negative fluid balance, improvement. Evaluated by cardiology with recommendation to follow-up outpatient to arrange ICD placement. Repeat TTE inpatient revealed EF 25-30%. Patient subsequently transitioned to oral Lasix and will continue on Entresto, Aldactone, and Coreg. Hospital course complicated after patient developed worsening mental status with confusion, then on 3/16 patient fell out of bed with resultant right elbow and head trauma. No evidence of fracture on plain films of elbow. CT head with no intracranial bleeding or other abnormality aside from mild scalp soft tissue swelling in right frontal and parietal region. No evidence of skull fracture. Given head trauma, Xarelto and aspirin were held. Noted to have inappropriate elevated TSH, so increased Synthroid. Obtained MRI brain on 3/17 which revealed chronic ischemic changes with atrophy but no acute findings otherwise. Due to encephalopathy, repeat UA which showed active urine sediment consistent with UTI. Urine culture speciated ESBL E. coli and Enterococcus, with susceptibilities reviewed, antibiotics switched to meropenem and amoxicillin. Patient was noted to have some occasional myoclonic movement, given concern for acute change, repeated CT head which showed no interval change. EEG obtained and neurology consulted. Noted to have worsening hypercalcemia during hospital course with work-up initiated including PTH. Treated hypercalcemia with calcitonin with some improvement. Patient remained intermittently encephalopathic and failed initial swallow evaluation with speech therapy.             Acute systolic heart failure  -Improved, continue oral diuretic  --continue coreg  --Temporarily held Entresto and Aldactone due to renal function and BP intermittently on lower side, will resume as appropriate  - TTE shows EF of 25-30%, no significant improvement from previous   -Cardiology recommendations noted-outpatient cardiology follow-up to arrange ICD placement  -Will need LifeVest on discharge    Acute encephalopathy, multifactorial with infection and UTI, possibly metabolic with JENNA/hypercalcemia, possible postconcussive syndrome from fall/head trauma  --Speech following, Did not pass swallow evaluation  -Continue neurochecks with low threshold for repeat CT head for any acute changes  -Limit sedating meds  --Neurology following    UTI with ESBL E. coli and Enterococcus from urine culture  Urine culture susceptibilities reviewed  -Complete antibiotic course with meropenem for ESBL E. coli and ampicillin for Enterococcus after doses this evening, observe off antibiotics tomorrow    Fall with head trauma, superficial hematoma  --Continue to hold aspirin and Xarelto    Hypercalcemia  -Follow ionized calcium, no evidence of hyperparathyroidism, normal vitamin D and phosphorus, PTHrP lab sent  -Improved with dose of calcitonin    JENNA on CKD, suspect cardiorenal syndrome  -Stable, monitor urine output and labs, avoid nephrotoxic agents and hypotension    Poor oral intake with AMS  --Continue IV fluid at low rate  ---Failed swallow eval today, continue to follow may need NG tube for nutrition if unable to pass swallow evaluation in next 1-2 days    A-fib  -Rate controlled, if unable to take p.o.  Coreg may add IV Lopressor as needed  --Xarelto held following fall with head trauma    HTN  -Monitor, as needed IV antihypertensives if uncontrolled    hypothyroidism  -Inappropriately elevated TSH, continue Synthroid at increased dose, switch to IV dosing if unable to take p.o., outpatient follow-up with repeat TSH in 6 weeks    Debility  Recently at [facility]  Continue PT/OT      disposition TBD pending clinical improvement of above issues, accepted to rehab facility, plan to discharge rehab facility pending improvement        Kiarra Espinosa MD 3/23/2022 3:31 PM

## 2022-03-23 NOTE — PROGRESS NOTES
Speech Language Pathology  Facility/Department: [address] CARE   CLINICAL BEDSIDE SWALLOW EVALUATION  SPEECH PRODUCTION EVALUATION     NAME: Nellie Chavez  : 1940  MRN: [id number]    ADMISSION DATE: 3/13/2022  ADMITTING DIAGNOSIS: has Secondary hypertension; Coronary artery disease involving native coronary artery of native heart without angina pectoris; Left bundle branch block; Benign hypertension; Hyperlipidemia; Altered mental status; Palliative care patient; Acute encephalopathy; Visual hallucinations; Parkinsonism ([address] Generalized weakness; Essential hypertension; CHF (congestive heart failure), NYHA class I, acute on chronic, combined ([address] Hypothyroidism; Arthritis; Bladder infection; Chronic kidney disease; Colon polyps; Constipation; Gallbladder disease; GERD (gastroesophageal reflux disease); Gout; Heart attack ([address] Heart disease; Incontinence; Acute on chronic systolic heart failure, NYHA class 4 ([address] JENNA (acute kidney injury) ([facility] Chronic atrial fibrillation ([facility] [address] UTI due to extended-spectrum beta lactamase (ESBL) producing Escherichia coli; Encephalopathy due to infection; Enterococcus UTI; Hypercalcemia; Spasms of the hands or feet; and Traumatic hematoma of head on their problem list.    Date of Eval: 3/23/2022  Evaluating Therapist: TRISTIN Hannon Mc    Current Diet level:  Regular solid consistency with thin liquids    Reason for Referral  Nellie Chavez was referred for a bedside swallow evaluation to assess the efficiency of her swallow function, identify signs and symptoms of aspiration and make recommendations regarding safe dietary consistencies, effective compensatory strategies, and safe eating environment. Impression  Assessed patient's swallowing function. Patient exhibited decreased oral prep, decreased oral transit of even blended food consistency, and sluggish, mildly decreased laryngeal elevation for swallow airway protection.  Even so, no outward S/S penetration/aspiration was noted with any ice chip trial or puree consistency trial presented during evaluation this date. At this time, would recommend NPO status. Essential meds crushed in pudding/applesauce (check for clearance of mouth). If patient receives mouth care prior to intake, okay for ice chips and swabs thin H2O for comfort. Will continue to follow. Thank you for this consult. Treatment Plan  Requires SLP Intervention: Yes     Recommended Diet and Intervention  Diet Solids Recommendation: NPO  Liquid Consistency Recommendation: NPO   Recommended Form of Meds: Meds crushed in puree as able  Therapeutic Interventions: Patient/Family education;Oral Care; Therapeutic PO trials with SLP     Treatment/Goals  Timeframe for Short-term Goals: 1-2x/day for 3 days   Goal 1: Patient NPO. Goal 2: Patient staff will follow swallow safety recommendations. Goal 3: Patient will receive daily oral care, via staff, to decrease bacteria from the oral cavity. Goal 4: Re-assessment of swallow function for potential PO intake. Goal 5: Monitor speech production. Goal 6: Cognitive-linguistic eval      General  Chart Reviewed: Yes  Behavior/Cognition: Lethargic  O2 Device: Nasal Cannula  Communication Observation: (Assessed patient's speech production. Patient exhibited decreased volume of speech, decreased labial movements, and slow, decreased lingual movements during verbalizations.  SLP ranked functional intelligibility of speech for unfamiliar listeners at 30-40% in utterances with background noise present.)  Follows Directions: Simple   Patient Positioning: Upright in bed  Consistencies Administered: Ice chips;Dysphagia Pureed (Dysphagia I)     Oral Motor Examination   Labial ROM: (Decreased, bilaterally, during labial retraction trials and labial protrusion trials.)  Labial Strength: (Decreased during labial compression trials.)  Labial Coordination: (Slowed movements were noted.)  Lingual ROM: (Decreased

## 2022-03-23 NOTE — PROGRESS NOTES
Continue with monitoring of patient status. Currently patient is very weak. NPO now after swallow study. Dr Quentin Iglesias noted long discussion with daughter but did not specify.

## 2022-03-23 NOTE — PROGRESS NOTES
03/23/22 1131   Restrictions/Precautions   Restrictions/Precautions Fall Risk   General   Family / Caregiver Present Yes   Subjective   Subjective Patient agrees to sit EOB   General Comment   Comments Patient groggy and lethagic has trouble keeping   eyes open   Pain Screening   Patient Currently in Pain Yes   Intervention List Patient able to continue with treatment   Pain Assessment   Pain Assessment Faces   Carrera-Suárez Pain Rating 6   Pain Type Acute pain   Pain Location Generalized   Non-Pharmaceutical Pain Intervention(s) Rest;Repositioned   Response to Pain Intervention Patient Satisfied   Oxygen Therapy   O2 Device Nasal cannula   O2 Flow Rate (L/min) 2 L/min   Bed Mobility   Rolling Dependent/Total  (x 2)   Supine to Sit Dependent/Total   Sit to Supine Maximal assistance   Scooting Dependent/Total;2 Person assistance  (To EOB and up in trendelenberg)   Comment Patient sat EOB x 10 minutes. DEP at first  held balance for only very brief seconds   Transfers   Sit to Stand Unable to assess   Other Activities   Comment Patient rolled for clean up and replace pads   Activity Tolerance   Activity Tolerance Patient limited by endurance   Safety Devices   Type of devices Left in bed;Call light within reach; Bed alarm in place   Physical Therapy    Electronically signed by Abisai Tamez PTA on 3/23/2022 at 11:42 AM

## 2022-03-23 NOTE — PROGRESS NOTES
Patient:   Jessica Hines  MR#:    [id number]   Room:    [facility]/572-40   YOB: 1940  Date of Progress Note: 3/23/2022  Time of Note                           9:12 AM  Consulting Physician:   Marianela Whiteside M.D. Attending Physician:  Justino Moraes MD       CHIEF COMPLAINT: Confusion and abnormal motor movements  ? Subjective  This 79 y.o. female who was admitted on 3/13 with CHF and underwent a significant diuresis. Developed some confusion on 3/16 and fell out of bed hitting her head. CT of the head and MRI of the head showing no obvious intracranial abnormalities and no evidence of skull fracture. Due to encephalopathy a repeat UA was consistent with a urinary tract infection and she is now on Merrem. According to the nurse, she was having some intermittent myoclonic type movements but currently is having none. No recurrent neurological difficulties noted. Patient more awake and alert this morning but looks weaker in general.  Long discussion with daughter at bedside. REVIEW OF SYSTEMS:  Constitutional: No fevers No chills  Neck:No stiffness  Respiratory: No shortness of breath  Cardiovascular: No chest pain No palpitations  Gastrointestinal: No abdominal pain    Genitourinary: No Dysuria  Neurological: No headache, no seizures      PHYSICAL EXAM:  /81   Pulse 83   Temp 97.1 °F (36.2 °C) (Temporal)   Resp 17   Ht 5' 3\" (1.6 m)   Wt 169 lb 14.4 oz (77.1 kg)   SpO2 94%   BMI 30.10 kg/m²     Constitutional: she appears well-developed and well-nourished. Eyes - conjunctiva normal.  Pupils react to light  Ear, nose, throat -hearing intact to voice.  No scars, masses, or lesions over external nose or ears, no atrophy of tongue  Neck-symmetric, no masses noted, no jugular vein distension  Respiration- chest wall appears symmetric, good expansion,   normal effort without use of accessory muscles  Cardiovascular- RRR  Musculoskeletal - no significant wasting of muscles noted, no bony deformities, gait no gross ataxia  Extremities-no clubbing, cyanosis or edema  Skin - warm, dry, and intact. No rash, erythema, or pallor. Psychiatric - mood, affect, and behavior appear normal.      Neurology  NEUROLOGICAL EXAM:      Mental status   Very tired appearing but opens eyes to voice. .  Oriented x3 and following all simple commands     Cranial Nerves   CN II- Visual fields grossly unremarkable  CN III, IV,VI-EOMI, No nystagmus, conjugate eye movements, no ptosis  CN VII-no facial asymmetry  CN VIII-Hearing intact   CN IX and X- Palate elevates in midline  CN XI-good shoulder shrug  CN XII-Tongue midline with no fasciculations or fibrillations          Motor function  Antigravity x 4             Tremor None present. Asterixis noted     Gait                  Not tested           Nursing/pcp notes, imaging,labs and vitals reviewed. PT,OT and/or speech notes reviewed    Lab Results   Component Value Date    WBC 6.3 03/23/2022    HGB 9.3 (L) 03/23/2022    HCT 31.8 (L) 03/23/2022    MCV 92.2 03/23/2022     03/23/2022     Lab Results   Component Value Date     03/23/2022    K 4.7 03/23/2022     03/23/2022    CO2 24 03/23/2022    BUN 43 (H) 03/23/2022    CREATININE 1.6 (H) 03/23/2022    GLUCOSE 135 (H) 03/23/2022    CALCIUM 9.9 03/23/2022    PROT 7.5 03/13/2022    LABALBU 3.6 03/23/2022    BILITOT 0.7 03/13/2022    ALKPHOS 90 03/13/2022    AST 17 03/13/2022    ALT 11 03/13/2022    LABGLOM 31 (A) 03/23/2022    GFRAA 37 (L) 03/23/2022     Lab Results   Component Value Date    INR 1.86 (H) 03/13/2022    INR 1.05 12/31/2021    INR 0.97 11/22/2021   No results found for: PHENYTOIN, ESR, CRP          RECORD REVIEW: Previous medical records, medications were reviewed at today's visit    IMPRESSION:   1. Elderly woman with CHF, encephalopathy. Suspect the latter is multifactorial and related to UTI and probable postconcussive syndrome. EEG showing no sign of ongoing activity.   Normal ammonia level.  MRI of the head showing no evidence of brain injury/infarction. Continue supportive care and avoid sedating medications. Overall, I feel her encephalopathy has significantly improved. She has asterixis of unknown cause at this time. Possibly related to [address]. Of biggest concern right now is nutritional support and lying in bed for so many days. She is extremely weak. Speech has made her n.p.o. from a swallowing study today. We discussed a Dobbhoff tube with her daughter at the bedside. 2.  A. fib-Coreg/holding Xarelto  3. JENNA on CKD-continue to monitor    PT/OT. She has a bed at [facility]  Needs nutrition. More than 35 minutes were spent reviewing the patient's records, examination and and counseling and coordination of care.   More than 50% spent on the latter

## 2022-03-24 PROBLEM — R27.8 ASTERIXIS: Status: ACTIVE | Noted: 2022-03-20

## 2022-03-24 LAB
ALBUMIN SERPL-MCNC: 3.7 G/DL (ref 3.5–5.2)
ANION GAP SERPL CALCULATED.3IONS-SCNC: 13 MMOL/L (ref 7–19)
BUN BLDV-MCNC: 48 MG/DL (ref 8–23)
CALCIUM SERPL-MCNC: 10 MG/DL (ref 8.8–10.2)
CHLORIDE BLD-SCNC: 107 MMOL/L (ref 98–111)
CO2: 24 MMOL/L (ref 22–29)
CREAT SERPL-MCNC: 1.5 MG/DL (ref 0.5–0.9)
GFR AFRICAN AMERICAN: 40
GFR NON-AFRICAN AMERICAN: 33
GLUCOSE BLD-MCNC: 129 MG/DL (ref 74–109)
HCT VFR BLD CALC: 33.4 % (ref 37–47)
HEMOGLOBIN: 9.7 G/DL (ref 12–16)
MCH RBC QN AUTO: 26.8 PG (ref 27–31)
MCHC RBC AUTO-ENTMCNC: 29 G/DL (ref 33–37)
MCV RBC AUTO: 92.3 FL (ref 81–99)
PDW BLD-RTO: 16.2 % (ref 11.5–14.5)
PLATELET # BLD: 174 K/UL (ref 130–400)
PMV BLD AUTO: 11.6 FL (ref 9.4–12.3)
POTASSIUM REFLEX MAGNESIUM: 4.9 MMOL/L (ref 3.5–5)
RBC # BLD: 3.62 M/UL (ref 4.2–5.4)
SODIUM BLD-SCNC: 144 MMOL/L (ref 136–145)
WBC # BLD: 7.8 K/UL (ref 4.8–10.8)

## 2022-03-24 PROCEDURE — 2580000003 HC RX 258

## 2022-03-24 PROCEDURE — 6360000002 HC RX W HCPCS: Performed by: STUDENT IN AN ORGANIZED HEALTH CARE EDUCATION/TRAINING PROGRAM

## 2022-03-24 PROCEDURE — 80048 BASIC METABOLIC PNL TOTAL CA: CPT

## 2022-03-24 PROCEDURE — 6370000000 HC RX 637 (ALT 250 FOR IP): Performed by: INTERNAL MEDICINE

## 2022-03-24 PROCEDURE — 94640 AIRWAY INHALATION TREATMENT: CPT

## 2022-03-24 PROCEDURE — 36415 COLL VENOUS BLD VENIPUNCTURE: CPT

## 2022-03-24 PROCEDURE — 82040 ASSAY OF SERUM ALBUMIN: CPT

## 2022-03-24 PROCEDURE — 2140000000 HC CCU INTERMEDIATE R&B

## 2022-03-24 PROCEDURE — 2700000000 HC OXYGEN THERAPY PER DAY

## 2022-03-24 PROCEDURE — 92507 TX SP LANG VOICE COMM INDIV: CPT

## 2022-03-24 PROCEDURE — 6370000000 HC RX 637 (ALT 250 FOR IP): Performed by: STUDENT IN AN ORGANIZED HEALTH CARE EDUCATION/TRAINING PROGRAM

## 2022-03-24 PROCEDURE — 92526 ORAL FUNCTION THERAPY: CPT

## 2022-03-24 PROCEDURE — 6370000000 HC RX 637 (ALT 250 FOR IP)

## 2022-03-24 PROCEDURE — 99232 SBSQ HOSP IP/OBS MODERATE 35: CPT | Performed by: PSYCHIATRY & NEUROLOGY

## 2022-03-24 PROCEDURE — 85027 COMPLETE CBC AUTOMATED: CPT

## 2022-03-24 PROCEDURE — 97530 THERAPEUTIC ACTIVITIES: CPT

## 2022-03-24 PROCEDURE — 97535 SELF CARE MNGMENT TRAINING: CPT

## 2022-03-24 RX ADMIN — SODIUM CHLORIDE, PRESERVATIVE FREE 10 ML: 5 INJECTION INTRAVENOUS at 20:06

## 2022-03-24 RX ADMIN — IPRATROPIUM BROMIDE 0.5 MG: 0.5 SOLUTION RESPIRATORY (INHALATION) at 06:52

## 2022-03-24 RX ADMIN — DOCUSATE SODIUM 100 MG: 100 CAPSULE, LIQUID FILLED ORAL at 08:15

## 2022-03-24 RX ADMIN — ALLOPURINOL 100 MG: 100 TABLET ORAL at 20:06

## 2022-03-24 RX ADMIN — ALLOPURINOL 100 MG: 100 TABLET ORAL at 08:15

## 2022-03-24 RX ADMIN — CARVEDILOL 12.5 MG: 12.5 TABLET, FILM COATED ORAL at 08:16

## 2022-03-24 RX ADMIN — Medication 400 MG: at 08:16

## 2022-03-24 RX ADMIN — IPRATROPIUM BROMIDE 0.5 MG: 0.5 SOLUTION RESPIRATORY (INHALATION) at 19:12

## 2022-03-24 RX ADMIN — THIAMINE HYDROCHLORIDE 100 MG: 100 INJECTION, SOLUTION INTRAMUSCULAR; INTRAVENOUS at 08:15

## 2022-03-24 RX ADMIN — Medication 400 MG: at 20:06

## 2022-03-24 RX ADMIN — Medication 1000 UNITS: at 08:16

## 2022-03-24 RX ADMIN — SODIUM CHLORIDE, PRESERVATIVE FREE 10 ML: 5 INJECTION INTRAVENOUS at 08:16

## 2022-03-24 RX ADMIN — CYANOCOBALAMIN TAB 500 MCG 1000 MCG: 500 TAB at 08:15

## 2022-03-24 RX ADMIN — FUROSEMIDE 40 MG: 40 TABLET ORAL at 08:15

## 2022-03-24 RX ADMIN — CARVEDILOL 12.5 MG: 12.5 TABLET, FILM COATED ORAL at 15:51

## 2022-03-24 RX ADMIN — IPRATROPIUM BROMIDE 0.5 MG: 0.5 SOLUTION RESPIRATORY (INHALATION) at 14:38

## 2022-03-24 RX ADMIN — ATORVASTATIN CALCIUM 20 MG: 20 TABLET, FILM COATED ORAL at 08:15

## 2022-03-24 RX ADMIN — SACUBITRIL AND VALSARTAN 1 TABLET: 24; 26 TABLET, FILM COATED ORAL at 20:06

## 2022-03-24 ASSESSMENT — PAIN DESCRIPTION - FREQUENCY
FREQUENCY: INTERMITTENT
FREQUENCY: INTERMITTENT

## 2022-03-24 ASSESSMENT — PAIN DESCRIPTION - PAIN TYPE
TYPE: ACUTE PAIN
TYPE: ACUTE PAIN

## 2022-03-24 ASSESSMENT — PAIN - FUNCTIONAL ASSESSMENT: PAIN_FUNCTIONAL_ASSESSMENT: PREVENTS OR INTERFERES SOME ACTIVE ACTIVITIES AND ADLS

## 2022-03-24 ASSESSMENT — PAIN DESCRIPTION - LOCATION
LOCATION: ABDOMEN;GENERALIZED
LOCATION: ABDOMEN;GENERALIZED

## 2022-03-24 ASSESSMENT — PAIN DESCRIPTION - PROGRESSION: CLINICAL_PROGRESSION: NOT CHANGED

## 2022-03-24 ASSESSMENT — PAIN DESCRIPTION - DESCRIPTORS
DESCRIPTORS: PATIENT UNABLE TO DESCRIBE
DESCRIPTORS: PATIENT UNABLE TO DESCRIBE

## 2022-03-24 ASSESSMENT — PAIN SCALES - WONG BAKER
WONGBAKER_NUMERICALRESPONSE: 4
WONGBAKER_NUMERICALRESPONSE: 4

## 2022-03-24 NOTE — PROGRESS NOTES
Occupational Therapy     03/24/22 1318   Restrictions/Precautions   Restrictions/Precautions Fall Risk   Vision   Vision [facility]   Hearing   Hearing [facility]   General   Chart Reviewed Yes   Patient assessed for rehabilitation services? Yes   Response to previous treatment Patient with no complaints from previous session   Family / Caregiver Present No   Subjective   Subjective Pt in bed upon arrival for therapy. Pt lethargic but agreeable to participate. Pain Assessment   Patient Currently in Pain Yes   Pain Assessment Faces   Carrera-Suárez Pain Rating 4   Pain Type Acute pain   Pain Location Abdomen;Generalized   Pain Descriptors Patient unable to describe   Pain Frequency Intermittent   Clinical Progression Not changed   Response to Pain Intervention Patient Satisfied   Pre Treatment Pain Screening   Intervention List Patient able to continue with treatment   Oxygen Therapy   O2 Device Nasal cannula   O2 Flow Rate (L/min) 2 L/min   Orientation   Overall Orientation Status X   Orientation Level Disoriented X4   ADL   Feeding Maximum assistance;Verbal cueing; Increased time to complete   Grooming Maximum assistance;Verbal cueing; Increased time to complete   UE Bathing Maximum assistance   LE Bathing Dependent/Total   UE Dressing Maximum assistance   LE Dressing Dependent/Total   Toileting Dependent/Total   Balance   Sitting Balance Moderate assistance   Standing Balance Unable to assess(comment)   Bed mobility   Rolling to Left Maximum assistance;2 Person assistance   Rolling to Right Maximum assistance;2 Person assistance   Supine to Sit Maximum assistance;2 Person assistance   Sit to Supine Maximum assistance;2 Person assistance   Scooting Dependent/Total;2 Person assistance   Transfers   Sit to stand Unable to assess   Stand to sit Unable to assess   Assessment   Performance deficits / Impairments Decreased functional mobility ; Decreased ADL status; Decreased strength;Decreased safe awareness;Decreased cognition;Decreased endurance;Decreased balance;Decreased coordination;Decreased posture   Assessment The patient will need further therapy to upgrade functional status. Treatment Diagnosis Debility, heart failure, acute encephalopathy, fall with head trauma   Prognosis Fair   REQUIRES OT FOLLOW UP Yes   Treatment Initiated  tx focused on h/g tasks while sitting up in bed with HOB raised; Max assist x2 to get patient sitting EOB this date; Max x2 to get patient repositioned in bed and set up for lunch. Pt very lethargic throughout tx. Able to follow some cues but demos decreased activity tolerated. Discharge Recommendations Patient would benefit from continued therapy after discharge   Activity Tolerance   Activity Tolerance Treatment limited secondary to decreased cognition;Patient limited by pain; Patient limited by fatigue   Safety Devices   Safety Devices in place Yes   Type of devices Bed alarm in place;Call light within reach   Plan   Times per week 3-5   Times per day Daily   Electronically signed by PAIGE Carl on 3/24/2022 at 1:28 PM

## 2022-03-24 NOTE — PROGRESS NOTES
Daily Progress Note    Date:3/24/2022  Patient: Crys Beltrán  : 1940  PTO:[id number]  CODE:DNR No additional code details  PCP:Delonte Nicole    Admit Date: 3/13/2022  3:10 PM   LOS: 11 days         Subjective:   No status seems improved this AM.  She is much more awake and interactive, although upset and tearful. No worsening shortness of breath, fevers or chills.       Review of Systems  Comprehensive ROS completed and is negative except as otherwise noted        Objective:      Vital signs in last 24 hours:  Patient Vitals for the past 24 hrs:   BP Temp Temp src Pulse Resp SpO2 Weight   22 1400 128/75 96.8 °F (36 °C) Temporal 75 20 94 % --   22 0612 131/87 97.2 °F (36.2 °C) Temporal 82 16 90 % 165 lb 9.6 oz (75.1 kg)   22 0011 118/63 97 °F (36.1 °C) Temporal 90 24 91 % --     Physical exam    General: Elderly frail-appearing female, upset  HEENT: areas of soft tissue swelling/superficial hematoma on scalp in healing stage  Cardiovascular: Normal rate, regular rhythm, pulses 2+ and equal  Respiratory: Diminished breath sounds bilaterally without wheezes or rales, no increased work of breathing  Abdomen: Soft, nontender, bowel sounds present  Neurologic: Alert, conversant, oriented to year but disoriented to place  Extremities: No clubbing or cyanosis  Skin: Warm and dry        Lab Review   Recent Results (from the past 24 hour(s))   CBC    Collection Time: 22  1:52 AM   Result Value Ref Range    WBC 7.8 4.8 - 10.8 K/uL    RBC 3.62 (L) 4.20 - 5.40 M/uL    Hemoglobin 9.7 (L) 12.0 - 16.0 g/dL    Hematocrit 33.4 (L) 37.0 - 47.0 %    MCV 92.3 81.0 - 99.0 fL    MCH 26.8 (L) 27.0 - 31.0 pg    MCHC 29.0 (L) 33.0 - 37.0 g/dL    RDW 16.2 (H) 11.5 - 14.5 %    Platelets 668 664 - 424 K/uL    MPV 11.6 9.4 - 12.3 fL   Basic Metabolic Panel w/ Reflex to MG    Collection Time: 22  1:52 AM   Result Value Ref Range    Sodium 144 136 - 145 mmol/L    Potassium reflex Magnesium 4.9 3.5 - 5.0 mmol/L    Chloride 107 98 - 111 mmol/L    CO2 24 22 - 29 mmol/L    Anion Gap 13 7 - 19 mmol/L    Glucose 129 (H) 74 - 109 mg/dL    BUN 48 (H) 8 - 23 mg/dL    CREATININE 1.5 (H) 0.5 - 0.9 mg/dL    GFR Non- 33 (A) >60    GFR  40 (L) >59    Calcium 10.0 8.8 - 10.2 mg/dL   Albumin    Collection Time: 03/24/22  1:52 AM   Result Value Ref Range    Albumin 3.7 3.5 - 5.2 g/dL       I/O last 3 completed shifts:   In: 150 [P.O.:150]  Out: 1400 [Urine:1400]  I/O this shift:  In: -   Out: 200 [Urine:200]      Current Facility-Administered Medications:     ipratropium (ATROVENT) 0.02 % nebulizer solution 0.5 mg, 0.5 mg, Nebulization, TID, Kiarra Espinosa MD, 0.5 mg at 03/24/22 1438    [START ON 3/27/2022] Levothyroxine Sodium (SYNTHROID) 100 MCG/5ML injection 76 mcg, 76 mcg, IntraVENous, Daily, Kiarra Espinosa MD    thiamine (B-1) injection 100 mg, 100 mg, IntraVENous, Daily, Kiarra Espinosa MD, 100 mg at 03/24/22 0815    metoprolol (LOPRESSOR) injection 5 mg, 5 mg, IntraVENous, Q6H PRN, Kiarra Espinosa MD    magic butt cream, , Topical, Q4H PRN, Kiarra Espinosa MD, Given at 03/21/22 0307    dextrose 5 % in lactated ringers infusion, , IntraVENous, Continuous, Kiarra Espinosa MD, Stopped at 03/24/22 1121    polyethylene glycol (GLYCOLAX) packet 17 g, 17 g, Oral, Daily, Kiarra Espinosa MD, 17 g at 03/20/22 1107    docusate sodium (COLACE) capsule 100 mg, 100 mg, Oral, Daily, Kiarra Espinosa MD, 100 mg at 03/24/22 0815    [Held by provider] levothyroxine (SYNTHROID) tablet 112 mcg, 112 mcg, Oral, Daily, Kiarra Espinosa MD, 112 mcg at 03/20/22 0818    furosemide (LASIX) tablet 40 mg, 40 mg, Oral, Daily, Hari Velásquez MD, 40 mg at 03/24/22 0815    [Held by provider] rivaroxaban (XARELTO) tablet 15 mg, 15 mg, Oral, Dinner, Hari Velásquez MD, 15 mg at 03/15/22 2127    sodium chloride flush 0.9 % injection 5-40 mL, 5-40 mL, IntraVENous, 2 times per day, Sarahy Delgado, APRN - CNP, 10 mL at 03/24/22 0816    sodium chloride flush 0.9 % injection 5-40 mL, 5-40 mL, IntraVENous, PRN, Saad Tirado APRN - CNP    0.9 % sodium chloride infusion, 25 mL, IntraVENous, PRN, Saad Tirado, APRN - CNP    ondansetron (ZOFRAN-ODT) disintegrating tablet 4 mg, 4 mg, Oral, Q8H PRN **OR** ondansetron (ZOFRAN) injection 4 mg, 4 mg, IntraVENous, Q6H PRN, Saad Tirado APRN - CNP, 4 mg at 03/22/22 0149    acetaminophen (TYLENOL) tablet 650 mg, 650 mg, Oral, Q6H PRN, 650 mg at 03/20/22 1141 **OR** acetaminophen (TYLENOL) suppository 650 mg, 650 mg, Rectal, Q6H PRN, Saad Tirado, APRN - CNP    allopurinol (ZYLOPRIM) tablet 100 mg, 100 mg, Oral, BID, Saad Tirado APRN - CNP, 100 mg at 03/24/22 0815    [Held by provider] amLODIPine (NORVASC) tablet 5 mg, 5 mg, Oral, Daily, Saad Tirado APRFLAKO - CNP, 5 mg at 03/17/22 0827    [Held by provider] aspirin EC tablet 81 mg, 81 mg, Oral, Daily, Saad Tirado APRN - CNP, 81 mg at 03/16/22 0933    atorvastatin (LIPITOR) tablet 20 mg, 20 mg, Oral, Daily, Saad Tirado APRN - CNP, 20 mg at 03/24/22 0815    carvedilol (COREG) tablet 12.5 mg, 12.5 mg, Oral, BID WC, Amando Raoch MD, 12.5 mg at 03/24/22 0816    vitamin B-12 (CYANOCOBALAMIN) tablet 1,000 mcg, 1,000 mcg, Oral, Daily, Saad Tirado APRN - CNP, 1,000 mcg at 03/24/22 0815    Vitamin D (CHOLECALCIFEROL) tablet 1,000 Units, 1,000 Units, Oral, Daily, Saad Tirado APRFLAKO - CNP, 1,000 Units at 03/24/22 0816    [Held by provider] trospium (SANCTURA) tablet 20 mg, 20 mg, Oral, Nightly, GIAN York - CNP, 20 mg at 03/15/22 2127    loperamide (IMODIUM) capsule 2 mg, 2 mg, Oral, 4x Daily PRN, GIAN York CNP, 2 mg at 03/21/22 0256    magnesium oxide (MAG-OX) tablet 400 mg, 400 mg, Oral, BID, GIAN York CNP, 400 mg at 03/24/22 0816    [Held by provider] isosorbide mononitrate (IMDUR) extended release tablet 30 mg, 30 mg, Oral, Daily, GIAN York - CNP, 30 mg at 03/18/22 0945    spironolactone (ALDACTONE) tablet 25 mg, 25 mg, Oral, Daily, Kiarra Espinosa MD, 25 mg at 03/18/22 0953    sacubitril-valsartan (ENTRESTO) 24-26 MG per tablet 1 tablet, 1 tablet, Oral, BID, Kiarra Espinosa MD, 1 tablet at 03/18/22 0948          Assessment and Plan  Principal Problem:    Acute on chronic systolic heart failure, NYHA class 4 (HCC)  Active Problems:    JENNA (acute kidney injury) ([address]    UTI due to extended-spectrum beta lactamase (ESBL) producing Escherichia coli    Encephalopathy due to infection    Enterococcus UTI    Generalized weakness    Hypothyroidism    Chronic atrial fibrillation (HCC)    Hypercalcemia    Traumatic hematoma of head    Coronary artery disease involving native coronary artery of native heart without angina pectoris    Asterixis  Resolved Problems:    * No resolved hospital problems. [facility] Course: Patient was admitted on 3/13 with worsening SOB and leg swelling. She was recently diagnosed with systolic CHF and was started on entresto on her previous admission with plans to repeat a TTE at the end of march for possible ICD placement. Admitted with acute systolic heart failure in setting of markedly elevated proBNP and JENNA suspected related to cardiorenal syndrome. She received diuresis with IV lasix and aldactone on this admission with significant net negative fluid balance, improvement. Evaluated by cardiology with recommendation to follow-up outpatient to arrange ICD placement. Repeat TTE inpatient revealed EF 25-30%. Patient subsequently transitioned to oral Lasix and will continue on Entresto, Aldactone, and Coreg. Hospital course complicated after patient developed worsening mental status with confusion, then on 3/16 patient fell out of bed with resultant right elbow and head trauma. No evidence of fracture on plain films of elbow.   CT head with no intracranial bleeding or other abnormality aside from mild scalp soft phosphorus, PTHrP lab sent  -Improved with dose of calcitonin    JENNA on CKD, suspect cardiorenal syndrome  -Stable, monitor urine output and labs, avoid nephrotoxic agents and hypotension    Poor oral intake with AMS  --Continue nutritional support, modified diet per speech    A-fib  -Rate controlled, continue Coreg  --Xarelto held following fall with head trauma, however will plan to resume soon    HTN  -Monitor, continue current antihypertensive regimen, further adjustment as needed    hypothyroidism  -Inappropriately elevated TSH, continue Synthroid at increased dose, switch to IV dosing if unable to take p.o., outpatient follow-up with repeat TSH in 6 weeks    Debility  Recently at [facility]  Continue PT/OT      Anticipate discharge to rehab facility soon, possibly tomorrow      Palak Verduzco MD 3/24/2022 3:21 PM

## 2022-03-24 NOTE — PROGRESS NOTES
Physical Therapy  Name: Teressa Means  MRN:  [id number]  Date of service:  3/24/2022       03/24/22 1328   Restrictions/Precautions   Restrictions/Precautions Fall Risk   Subjective   Subjective Pt agreed to therapy. Pain Screening   Patient Currently in Pain Yes   Pain Assessment   Pain Assessment Faces   Carrera-Suárez Pain Rating 4   Pain Type Acute pain   Pain Location Abdomen;Generalized   Pain Descriptors Patient unable to describe   Pain Frequency Intermittent   Functional Pain Assessment Prevents or interferes some active activities and ADLs   Non-Pharmaceutical Pain Intervention(s) Repositioned; Rest   Response to Pain Intervention Patient Satisfied   Bed Mobility   Supine to Sit Dependent/Total  (x2)   Sit to Supine Dependent/Total  (x2)   Scooting Dependent/Total;2 Person assistance   Comment pt sat EOB, able to maintain sitting balance once positioned but would occasionally experience posterior LOB   Short term goals   Time Frame for Short term goals 2 WKS   Short term goal 1 ROLLS R/L, MIN A 1   Short term goal 2 SUP<>SIT, MIN A 1   Short term goal 3 SIT <>STAND, MIN.MOD A 1   Short term goal 4 TF'S WITH AD AS INDICATED, MOD A 1   Conditions Requiring Skilled Therapeutic Intervention   Body structures, Functions, Activity limitations Decreased functional mobility ; Decreased endurance; Increased pain;Decreased balance;Decreased strength;Decreased safe awareness;Decreased cognition;Decreased posture   Assessment Pt worked on sitting balance while sitting EOB. Pt required total assist to come to sitting. Pt assisted back to bed and positioned for comfort. Activity Tolerance   Activity Tolerance Patient Tolerated treatment well   Safety Devices   Type of devices Left in bed;Call light within reach; Bed alarm in place       Electronically signed by Madison Shetty PTA on 3/24/2022 at 1:34 PM

## 2022-03-24 NOTE — PROGRESS NOTES
Patient:   Fabricio Sweet  MR#:    [id number]   Room:    188/800-99   YOB: 1940  Date of Progress Note: 3/24/2022  Time of Note                           7:15 AM  Consulting Physician:   Rivera Cardona M.D. Attending Physician:  Bettye Newell MD       CHIEF COMPLAINT: Confusion and abnormal motor movements  ? Subjective  This 79 y.o. female who was admitted on 3/13 with CHF and underwent a significant diuresis. Developed some confusion on 3/16 and fell out of bed hitting her head. CT of the head and MRI of the head showing no obvious intracranial abnormalities and no evidence of skull fracture. Due to encephalopathy a repeat UA was consistent with a urinary tract infection and she is now on Merrem. According to the nurse, she was having some intermittent myoclonic type movements but currently is having none. A little more awake and alert this morning. Remains mildly confused. REVIEW OF SYSTEMS:  Constitutional: No fevers No chills  Neck:No stiffness  Respiratory: No shortness of breath  Cardiovascular: No chest pain No palpitations  Gastrointestinal: No abdominal pain    Genitourinary: No Dysuria  Neurological: No headache, no seizures      PHYSICAL EXAM:  /87   Pulse 82   Temp 97.2 °F (36.2 °C) (Temporal)   Resp 16   Ht 5' 3\" (1.6 m)   Wt 165 lb 9.6 oz (75.1 kg)   SpO2 90%   BMI 29.33 kg/m²     Constitutional: she appears well-developed and well-nourished. Eyes - conjunctiva normal.  Pupils react to light  Ear, nose, throat -hearing intact to voice.  No scars, masses, or lesions over external nose or ears, no atrophy of tongue  Neck-symmetric, no masses noted, no jugular vein distension  Respiration- chest wall appears symmetric, good expansion,   normal effort without use of accessory muscles  Cardiovascular- RRR  Musculoskeletal - no significant wasting of muscles noted, no bony deformities, gait no gross ataxia  Extremities-no clubbing, cyanosis or edema  Skin - warm, dry, and intact. No rash, erythema, or pallor. Psychiatric - mood, affect, and behavior appear normal.      Neurology  NEUROLOGICAL EXAM:      Mental status   Very tired appearing but opens eyes to voice. .  Oriented x3 and following all simple commands     Cranial Nerves   CN II- Visual fields grossly unremarkable  CN III, IV,VI-EOMI, No nystagmus, conjugate eye movements, no ptosis  CN VII-no facial asymmetry  CN VIII-Hearing intact   CN IX and X- Palate elevates in midline  CN XI-good shoulder shrug  CN XII-Tongue midline with no fasciculations or fibrillations          Motor function  Antigravity x 4             Tremor None present. Asterixis noted     Gait                  Not tested           Nursing/pcp notes, imaging,labs and vitals reviewed. PT,OT and/or speech notes reviewed    Lab Results   Component Value Date    WBC 7.8 03/24/2022    HGB 9.7 (L) 03/24/2022    HCT 33.4 (L) 03/24/2022    MCV 92.3 03/24/2022     03/24/2022     Lab Results   Component Value Date     03/24/2022    K 4.9 03/24/2022     03/24/2022    CO2 24 03/24/2022    BUN 48 (H) 03/24/2022    CREATININE 1.5 (H) 03/24/2022    GLUCOSE 129 (H) 03/24/2022    CALCIUM 10.0 03/24/2022    PROT 7.5 03/13/2022    LABALBU 3.7 03/24/2022    BILITOT 0.7 03/13/2022    ALKPHOS 90 03/13/2022    AST 17 03/13/2022    ALT 11 03/13/2022    LABGLOM 33 (A) 03/24/2022    GFRAA 40 (L) 03/24/2022     Lab Results   Component Value Date    INR 1.86 (H) 03/13/2022    INR 1.05 12/31/2021    INR 0.97 11/22/2021   No results found for: PHENYTOIN, ESR, CRP          RECORD REVIEW: Previous medical records, medications were reviewed at today's visit    IMPRESSION:   1. Elderly woman with CHF, encephalopathy. Suspect the latter is multifactorial and related to UTI and probable postconcussive syndrome. EEG showing no sign of ongoing activity. Normal ammonia level. MRI of the head showing no evidence of brain injury/infarction.   Continue supportive care and avoid sedating medications. Overall, I feel her encephalopathy has significantly improved. She has had asterixis intermittently. Possibly related to [address]. This has been completed and discontinued. Of biggest concern right now is nutritional support and lying in bed for so many days. She is extremely weak. Speech has made her n.p.o. yesterday. See how she does today. She is a bit more alert. 2.  A. fib-Coreg/holding Xarelto  3. JENNA on CKD-continue to monitor    PT/OT. She has a bed at SNF  No further recommendations currently.

## 2022-03-24 NOTE — PROGRESS NOTES
Speech Language Pathology  Facility/Department: [facility] PROGRESSIVE CARE  SWALLOW THERAPY  SPEECH THERAPY     NAME: Carol Talbot  : 1940  MRN: [id number]    ADMISSION DATE: 3/13/2022  ADMITTING DIAGNOSIS: has Secondary hypertension; Coronary artery disease involving native coronary artery of native heart without angina pectoris; Left bundle branch block; Benign hypertension; Hyperlipidemia; Altered mental status; Palliative care patient; Acute encephalopathy; Visual hallucinations; Parkinsonism ([address] Generalized weakness; Essential hypertension; CHF (congestive heart failure), NYHA class I, acute on chronic, combined ([address] Hypothyroidism; Arthritis; Bladder infection; Chronic kidney disease; Colon polyps; Constipation; Gallbladder disease; GERD (gastroesophageal reflux disease); Gout; Heart attack ([address] Heart disease; Incontinence; Acute on chronic systolic heart failure, NYHA class 4 ([address] JENNA (acute kidney injury) ([address] Chronic atrial fibrillation ([address] UTI due to extended-spectrum beta lactamase (ESBL) producing Escherichia coli; Encephalopathy due to infection; Enterococcus UTI; Hypercalcemia; Spasms of the hands or feet; and Traumatic hematoma of head on their problem list.    Date of Treat: 3/24/2022  Evaluating Therapist: TRISTIN Donohue    Current Diet level:  NPO    Reason for Referral  Carol Talbot was referred for a bedside swallow evaluation to assess the efficiency of her swallow function, identify signs and symptoms of aspiration and make recommendations regarding safe dietary consistencies, effective compensatory strategies, and safe eating environment. Impression  Re-assessed patient's swallowing function. Patient exhibited decreased oral prep, slow oral transit of even blended food consistency, inconsistently slow oral transit and suspected swallow delay with even thickened liquids, and sluggish, mildly decreased laryngeal elevation for swallow airway protection. No outward S/S penetration/aspiration was noted with any ice chip trial, puree consistency trial, or moderately thick/honey thick liquid trial presented during treatment session this date. While no outward coughs/throat clears were observed, faint crackles were noted following mildly thick/nectar thick liquid trials. At this time, would trial puree consistency with moderately thick/honey thick liquids. TOTAL FEED ONLY WHEN ALERT. Recommend meds crushed in pudding/applesauce (check for clearance of mouth). If patient receives mouth care prior to intake, okay for ice chips IN BETWEEN MEALS for comfort. Will continue to follow.     Treatment Plan  Requires SLP Intervention: Yes     Recommended Diet and Intervention  Diet Solids Recommendation: Puree  Liquid Consistency Recommendation: Moderately thick (honey)  Recommended Form of Meds: Meds crushed in puree as able  Therapeutic Interventions: Patient/Family education;Oral Care; Therapeutic PO trials with SLP     Treatment/Goals  Timeframe for Short-term Goals: 1-2x/day for 3 days   Goal 1: Patient will tolerate puree consistency and moderately thick/honey thick liquids with min S/S penetration/aspiration during PO intake. Goal 2: Patient staff will follow swallow safety recommendations to decrease risk of penetration/aspiration during PO intake. Goal 3: Patient will receive daily oral care, via staff, to decrease bacteria from the oral cavity. Goal 4: Re-assessment of swallow function for potential diet upgrade. Goal 5: Monitor speech production. Goal 6: Cognitive-linguistic eval      General  Chart Reviewed: Yes  Behavior/Cognition: Patient appeared lethargic as time and trials progressed. O2 Device: Nasal Cannula  Communication Observation: (Re-assessed patient's speech production. Patient exhibited decreased volume of speech, decreased labial movements, and slow, decreased lingual movements during verbalizations.  SLP ranked functional intelligibility of speech for unfamiliar listeners at 50-60% in utterances with background noise present.)  Follows Directions: Simple   Patient Positioning: Upright in bed  Consistencies Administered: Ice chips;Dysphagia Pureed (Dysphagia I); Honey - cup;Nectar - cup      Oral Motor Examination   Labial ROM: (Decreased, bilaterally, during labial retraction trials and labial protrusion trials.)  Labial Strength: (Increased during labial compression trials, when compared to 03/23/22.)  Labial Coordination: (Slowed movements were noted.)  Lingual ROM: (Adequate during lingual extension trials with full point achieved; decreased during lingual elevation trials without use of accessory jaw movement; decreased movements noted bilaterally.)  Lingual Strength: (Decreased)  Lingual Coordination: (Slowed movements were noted.)     Re-assessed patient's swallowing function with the following observations noted:     Oral Phase  Mastication: Ice chips(Patient exhibited decreased rotary jaw movement during oral prep of ice chip trials presented by SLP.)  Suspected Premature Bolus Loss: Ice chips;Puree; Honey - cup;Nectar - cup (Oral transit of ice chip trials varied from 1-3 seconds in length. Patient exhibited slow oral transit of puree consistency trials presented by SLP. Oral transit of honey thick liquid trials and nectar thick liquid trials, presented via cup by SLP, varied from 1-3 seconds in length.)  Oral Phase - Comment: Min puree consistency residue was noted posts swallows; residue cleared from the mouth with additional dry swallows.     Pharyngeal Phase  Suspected Swallow Delay: Ice chips;Puree; Honey - cup;Nectar - cup (Suspect secondary to oral transit times.)  Laryngeal Elevation: (Patient exhibited sluggish, mildly decreased laryngeal elevation for swallow airway protection.)  Pharyngeal Phase - Comment: No outward S/S penetration/aspiration was noted with any ice chip trial, puree consistency trial, or moderately thick/honey thick liquid trial presented during treatment session this date. While no outward coughs/throat clears were observed, faint crackles were noted following mildly thick/nectar thick liquid trials. At this time, would trial puree consistency with moderately thick/honey thick liquids. TOTAL FEED ONLY WHEN ALERT. Recommend meds crushed in pudding/applesauce (check for clearance of mouth). If patient receives mouth care prior to intake, okay for ice chips IN BETWEEN MEALS for comfort. Will continue to follow.     Electronically signed by TRISTIN Wilkerson on 3/24/2022 at 10:02 AM

## 2022-03-25 VITALS
SYSTOLIC BLOOD PRESSURE: 139 MMHG | TEMPERATURE: 95.5 F | RESPIRATION RATE: 16 BRPM | HEIGHT: 63 IN | DIASTOLIC BLOOD PRESSURE: 73 MMHG | OXYGEN SATURATION: 94 % | BODY MASS INDEX: 29.34 KG/M2 | HEART RATE: 81 BPM | WEIGHT: 165.6 LBS

## 2022-03-25 LAB
ALBUMIN SERPL-MCNC: 3.4 G/DL (ref 3.5–5.2)
ANION GAP SERPL CALCULATED.3IONS-SCNC: 12 MMOL/L (ref 7–19)
BUN BLDV-MCNC: 60 MG/DL (ref 8–23)
CALCIUM SERPL-MCNC: 10 MG/DL (ref 8.8–10.2)
CHLORIDE BLD-SCNC: 106 MMOL/L (ref 98–111)
CO2: 24 MMOL/L (ref 22–29)
CREAT SERPL-MCNC: 1.5 MG/DL (ref 0.5–0.9)
GFR AFRICAN AMERICAN: 40
GFR NON-AFRICAN AMERICAN: 33
GLUCOSE BLD-MCNC: 109 MG/DL (ref 74–109)
HCT VFR BLD CALC: 32.9 % (ref 37–47)
HEMOGLOBIN: 9.7 G/DL (ref 12–16)
MCH RBC QN AUTO: 26.9 PG (ref 27–31)
MCHC RBC AUTO-ENTMCNC: 29.5 G/DL (ref 33–37)
MCV RBC AUTO: 91.4 FL (ref 81–99)
PDW BLD-RTO: 16.2 % (ref 11.5–14.5)
PLATELET # BLD: 174 K/UL (ref 130–400)
PMV BLD AUTO: 11.5 FL (ref 9.4–12.3)
POTASSIUM REFLEX MAGNESIUM: 4.3 MMOL/L (ref 3.5–5)
RBC # BLD: 3.6 M/UL (ref 4.2–5.4)
SODIUM BLD-SCNC: 142 MMOL/L (ref 136–145)
WBC # BLD: 6.9 K/UL (ref 4.8–10.8)

## 2022-03-25 PROCEDURE — 51798 US URINE CAPACITY MEASURE: CPT

## 2022-03-25 PROCEDURE — 82040 ASSAY OF SERUM ALBUMIN: CPT

## 2022-03-25 PROCEDURE — 6370000000 HC RX 637 (ALT 250 FOR IP): Performed by: INTERNAL MEDICINE

## 2022-03-25 PROCEDURE — 2700000000 HC OXYGEN THERAPY PER DAY

## 2022-03-25 PROCEDURE — 99232 SBSQ HOSP IP/OBS MODERATE 35: CPT | Performed by: PSYCHIATRY & NEUROLOGY

## 2022-03-25 PROCEDURE — 6370000000 HC RX 637 (ALT 250 FOR IP): Performed by: STUDENT IN AN ORGANIZED HEALTH CARE EDUCATION/TRAINING PROGRAM

## 2022-03-25 PROCEDURE — 6370000000 HC RX 637 (ALT 250 FOR IP)

## 2022-03-25 PROCEDURE — 36415 COLL VENOUS BLD VENIPUNCTURE: CPT

## 2022-03-25 PROCEDURE — 80048 BASIC METABOLIC PNL TOTAL CA: CPT

## 2022-03-25 PROCEDURE — 6360000002 HC RX W HCPCS: Performed by: STUDENT IN AN ORGANIZED HEALTH CARE EDUCATION/TRAINING PROGRAM

## 2022-03-25 PROCEDURE — 94640 AIRWAY INHALATION TREATMENT: CPT

## 2022-03-25 PROCEDURE — 92526 ORAL FUNCTION THERAPY: CPT

## 2022-03-25 PROCEDURE — 85027 COMPLETE CBC AUTOMATED: CPT

## 2022-03-25 RX ORDER — FUROSEMIDE 20 MG/1
40 TABLET ORAL DAILY
Qty: 60 TABLET | Refills: 0 | DISCHARGE
Start: 2022-03-25

## 2022-03-25 RX ORDER — LEVOTHYROXINE SODIUM 112 UG/1
112 TABLET ORAL DAILY
Qty: 30 TABLET | Refills: 0 | DISCHARGE
Start: 2022-03-25

## 2022-03-25 RX ADMIN — THIAMINE HYDROCHLORIDE 100 MG: 100 INJECTION, SOLUTION INTRAMUSCULAR; INTRAVENOUS at 13:15

## 2022-03-25 RX ADMIN — CARVEDILOL 12.5 MG: 12.5 TABLET, FILM COATED ORAL at 10:44

## 2022-03-25 RX ADMIN — Medication 1000 UNITS: at 10:44

## 2022-03-25 RX ADMIN — SACUBITRIL AND VALSARTAN 1 TABLET: 24; 26 TABLET, FILM COATED ORAL at 10:42

## 2022-03-25 RX ADMIN — IPRATROPIUM BROMIDE 0.5 MG: 0.5 SOLUTION RESPIRATORY (INHALATION) at 15:07

## 2022-03-25 RX ADMIN — SPIRONOLACTONE 25 MG: 25 TABLET ORAL at 10:43

## 2022-03-25 RX ADMIN — CYANOCOBALAMIN TAB 500 MCG 1000 MCG: 500 TAB at 10:43

## 2022-03-25 RX ADMIN — ATORVASTATIN CALCIUM 20 MG: 20 TABLET, FILM COATED ORAL at 10:44

## 2022-03-25 RX ADMIN — FUROSEMIDE 40 MG: 40 TABLET ORAL at 10:43

## 2022-03-25 RX ADMIN — ALLOPURINOL 100 MG: 100 TABLET ORAL at 10:43

## 2022-03-25 RX ADMIN — Medication 400 MG: at 10:43

## 2022-03-25 RX ADMIN — IPRATROPIUM BROMIDE 0.5 MG: 0.5 SOLUTION RESPIRATORY (INHALATION) at 06:49

## 2022-03-25 NOTE — PROGRESS NOTES
Patient:   Zaynab Ojeda  MR#:    [id number]   Room:    [facility]/717-56   YOB: 1940  Date of Progress Note: 3/25/2022  Time of Note                           7:23 AM  Consulting Physician:   Kemi Reyes M.D. Attending Physician:  Prabhjot Freeman MD       CHIEF COMPLAINT: Confusion and abnormal motor movements  ? Subjective  This 79 y.o. female who was admitted on 3/13 with CHF and underwent a significant diuresis. Developed some confusion on 3/16 and fell out of bed hitting her head. CT of the head and MRI of the head showing no obvious intracranial abnormalities and no evidence of skull fracture. Due to encephalopathy a repeat UA was consistent with a urinary tract infection and she is now on Merrem. According to the nurse, she was having some intermittent myoclonic type movements but currently is having none. Remains mildly confused. Now eating some. REVIEW OF SYSTEMS:  Constitutional: No fevers No chills  Neck:No stiffness  Respiratory: No shortness of breath  Cardiovascular: No chest pain No palpitations  Gastrointestinal: No abdominal pain    Genitourinary: No Dysuria  Neurological: No headache, no seizures      PHYSICAL EXAM:  /79   Pulse 75   Temp 96.3 °F (35.7 °C) (Temporal)   Resp 16   Ht 5' 3\" (1.6 m)   Wt 165 lb 9.6 oz (75.1 kg)   SpO2 92%   BMI 29.33 kg/m²     Constitutional: she appears well-developed and well-nourished. Eyes - conjunctiva normal.  Pupils react to light  Ear, nose, throat -hearing intact to voice. No scars, masses, or lesions over external nose or ears, no atrophy of tongue  Neck-symmetric, no masses noted, no jugular vein distension  Respiration- chest wall appears symmetric, good expansion,   normal effort without use of accessory muscles  Cardiovascular- RRR  Musculoskeletal - no significant wasting of muscles noted, no bony deformities, gait no gross ataxia  Extremities-no clubbing, cyanosis or edema  Skin - warm, dry, and intact.   No rash, Overall, I feel her encephalopathy has significantly improved. She has had asterixis intermittently. Possibly related to [address]. This has been completed and discontinued. She is now eating nectar thickened liquids. 2.  A. fib-Coreg/holding Xarelto  3. JENNA on CKD-continue to monitor    PT/OT. She has a bed at [facility] with neurology to go to SNF and will need continued PT/OT/SLP. She can follow-up in the office in a couple of weeks. Please contact if needed. I will see on Monday if here.

## 2022-03-25 NOTE — PROGRESS NOTES
Speech Language Pathology  Facility/Department: [facility] PROGRESSIVE CARE  SWALLOW THERAPY    NAME: Nancy Mobley  : 1940  MRN: [id number]    ADMISSION DATE: 3/13/2022  ADMITTING DIAGNOSIS: has Secondary hypertension; Coronary artery disease involving native coronary artery of native heart without angina pectoris; Left bundle branch block; Benign hypertension; Hyperlipidemia; Altered mental status; Palliative care patient; Acute encephalopathy; Visual hallucinations; Parkinsonism ([address] Generalized weakness; Essential hypertension; CHF (congestive heart failure), NYHA class I, acute on chronic, combined ([address] Hypothyroidism; Arthritis; Bladder infection; Chronic kidney disease; Colon polyps; Constipation; Gallbladder disease; GERD (gastroesophageal reflux disease); Gout; Heart attack ([address] Heart disease; Incontinence; Acute on chronic systolic heart failure, NYHA class 4 ([address] JENNA (acute kidney injury) ([address] Chronic atrial fibrillation ([address] UTI due to extended-spectrum beta lactamase (ESBL) producing Escherichia coli; Encephalopathy due to infection; Enterococcus UTI; Hypercalcemia; Asterixis; and Traumatic hematoma of head on their problem list.    Date of Treat: 3/25/2022  Evaluating Therapist: TRISTIN Roy    Current Diet level:  Puree consistency with moderately thick/honey thick liquids    Reason for Referral  Nancy Mobley was referred for a bedside swallow evaluation to assess the efficiency of her swallow function, identify signs and symptoms of aspiration and make recommendations regarding safe dietary consistencies, effective compensatory strategies, and safe eating environment. Impression  Re-assessed patient's swallowing function. Patient exhibited slow oral transit of even blended food consistency, fast oral transit and suspected swallow delay with even thickened liquids, and sluggish, mildly decreased laryngeal elevation for swallow airway protection.  No outward S/S penetration/aspiration was noted with any puree consistency presentation or moderately thick/honey thick liquid presentation administered during treatment session this date. While no outward coughs/throat clears were observed, increased respiratory rate was noted following mildly thick/nectar thick liquid trials. Did not observe swallow function with any additional consistency secondary to noted lethargy within short period of time.     At this time, would recommend continuation puree consistency with moderately thick/honey thick liquids. TOTAL FEED ONLY WHEN ALERT. Recommend meds crushed in pudding/applesauce (check for clearance of mouth). If patient receives mouth care prior to intake, okay for ice chips IN BETWEEN MEALS for comfort. Will continue to follow.     Treatment Plan  Requires SLP Intervention: Yes     Recommended Diet and Intervention  Diet Solids Recommendation: Puree  Liquid Consistency Recommendation: Moderately thick (honey)  Recommended Form of Meds: Meds crushed in puree as able  Therapeutic Interventions: Patient/Family education;Oral Care; Therapeutic PO trials with SLP     Treatment/Goals  Timeframe for Short-term Goals: 1-2x/day for 3 days   Goal 1: Patient will tolerate puree consistency and moderately thick/honey thick liquids with min S/S penetration/aspiration during PO intake. Goal 2: Patient staff will follow swallow safety recommendations to decrease risk of penetration/aspiration during PO intake.   Goal 3: Patient will receive daily oral care, via staff, to decrease bacteria from the oral cavity.   Goal 4: Re-assessment of swallow function for potential diet upgrade. Goal 5: Monitor speech production. Goal 6: Cognitive-linguistic eval      General  Chart Reviewed: Yes  Behavior/Cognition: Patient appeared lethargic within short period of time.   O2 Device: Nasal Cannula  Communication Observation: (Patient exhibited decreased volume of speech, decreased labial movements, and slow, decreased lingual movements during verbalizations.)  Follows Directions: Simple   Patient Positioning: Upright in bed  Consistencies Administered: Dysphagia Pureed (Dysphagia I); Honey - cup;Nectar - cup      Re-assessed patient's swallowing function with the following observations noted:     Oral Phase  Suspected Premature Bolus Loss: Puree; Honey - cup;Nectar - cup (Oral transit of puree consistency presentations and honey thick liquid presentations varied from 1-3 seconds in length. Patient exhibited fast oral transit of nectar thick liquid trials presented via cup.)  Oral Phase - Comment: Min puree consistency residue was noted posts swallows; residue cleared from the mouth with additional dry swallows.     Pharyngeal Phase  Suspected Swallow Delay: Puree; Honey - cup;Nectar - cup (Suspect secondary to oral transit times.)  Laryngeal Elevation: (Patient exhibited sluggish, mildly decreased laryngeal elevation for swallow airway protection.)  Pharyngeal Phase - Comment: No outward S/S penetration/aspiration was noted with any puree consistency presentation or moderately thick/honey thick liquid presentation administered during treatment session this date. While no outward coughs/throat clears were observed, increased respiratory rate was noted following mildly thick/nectar thick liquid trials. Did not observe swallow function with any additional consistency secondary to noted lethargy within short period of time.     At this time, would recommend continuation puree consistency with moderately thick/honey thick liquids. TOTAL FEED ONLY WHEN ALERT. Recommend meds crushed in pudding/applesauce (check for clearance of mouth). If patient receives mouth care prior to intake, okay for ice chips IN BETWEEN MEALS for comfort. Will continue to follow.     Electronically signed by TRISTIN Crews on 3/25/2022 at 1:05 PM

## 2022-03-25 NOTE — PROGRESS NOTES
Visited with pt's daughter to provide spiritual care. Pt's daughter says pt is supposed to discharge to [facility]. Pt was asleep and had been sleeping all morning. This  provided spiritual care with sustaining presence, nurtured hope, and prayer. Pt expressed gratitude for spiritual care.     Electronically signed by China Rebolledo on 3/25/2022 at 11:57 AM

## 2022-03-25 NOTE — CARE COORDINATION
Pt to admit to [facility] today and awaiting eval for life vest by rep. Electronically signed by Sam Goff on 3/25/2022 at 2:54 PM  Determined by family and life vest rep the pt to admit without the device and be re-evaluated at a later date as OP.

## 2022-03-25 NOTE — DISCHARGE SUMMARY
Discharge Summary    NAME: Shahida Stratton  :  1940  MRN:  [id number]    Admit date:  3/13/2022  Discharge date:   3/25/2022    Advance Directive: DNR    Consults: neurology    Primary Care Physician:  Kalin Tan    Discharge Diagnoses:  Principal Problem:    Acute on chronic systolic heart failure, NYHA class 4 ([facility] [address]  Active Problems:    JENNA (acute kidney injury) ([facility] [address]    UTI due to extended-spectrum beta lactamase (ESBL) producing Escherichia coli    Encephalopathy due to infection    Enterococcus UTI    Generalized weakness    Hypothyroidism    Chronic atrial fibrillation (HCC)    Hypercalcemia    Traumatic hematoma of head    Coronary artery disease involving native coronary artery of native heart without angina pectoris    Asterixis        Significant Diagnostic Studies:   CT ABDOMEN PELVIS WO CONTRAST Additional Contrast? Radiologist Recommendation    Result Date: 3/13/2022  CT ABDOMEN PELVIS WO CONTRAST 3/13/2022 4:38 PM HISTORY: Abdominal distention COMPARISON: None DLP: 1401 mGy cm. All CT scans are performed using dose optimization techniques as appropriate to the performed exam and include at least one of the following: Automated exposure control, adjustment of the mA and/or kV according to size, and the use of the iterative reconstruction technique. TECHNIQUE: Noncontrast enhanced images of the abdomen and pelvis obtained without oral contrast. FINDINGS: There is moderate cardiomegaly. Coronary calcifications are present. There is no pericardial effusion. Bilateral pleural effusions are present. There is mild bibasilar atelectasis. Wilmer Smith LIVER: No focal liver lesion. BILIARY SYSTEM: The gallbladder is surgically absent. No biliary dilatation is present. Wilmer Smith PANCREAS: No focal pancreatic lesion. SPLEEN: Unremarkable. KIDNEYS AND ADRENALS: Bilateral kidneys and adrenal glands are unremarkable. The ureters are decompressed and normal in appearance.  RETROPERITONEUM: No mass, lymphadenopathy or hemorrhage. There is heavy atheromatous calcification of the abdominal aorta and mesenteric vessels. GI TRACT: A small hiatal hernia is present. There is mild constipation. Multiple diverticula are noted within the descending and sigmoid colon with no radiographic evidence of acute diverticulitis. Small bowel is normal in distribution and appearance. . No radiographic evidence of acute appendicitis. . OTHER: There is no mesenteric mass, lymphadenopathy or fluid collection. No evidence of ascites. The osseous structures and soft tissues demonstrate no worrisome lesions. No evidence of a ventral wall hernia. PELVIS: The patient's undergone prior hysterectomy. A Verdugo catheter is in place within the bladder. . No bladder stones are present. There is no free fluid in the cul-de-sac. .    1. Mild constipation. There is diverticulosis of the descending and sigmoid colon with no evidence of acute diverticulitis. 2. No evidence of ascites. There is atheromatous calcification of the abdominal aorta and branch vessels. No evidence of aneurysm. 3. No evidence of nephrolithiasis or obstructive uropathy. 4. Small effusions with bibasilar atelectasis. There is moderate cardiomegaly. . Signed by Dr Loaiza See    Result Date: 3/13/2022  EXAMINATION: XR CHEST PORTABLE 3/13/2022 4:57 PM HISTORY: XR CHEST PORTABLE 3/13/2022 3:19 PM HISTORY: Short of breath COMPARISON: December 31, 2021. FINDINGS: The lungs are clear. Cardiac silhouettes mildly enlarged. There is fullness in the right hilum. Wires are present previous median sternotomy. . The osseous structures and surrounding soft tissues demonstrate no acute abnormality. 1. Mild cardiomegaly no obvious pulmonary vascular congestion.  Signed by Dr Rachel Carvajal     DUP LOWER EXTREMITY VENOUS BILATERAL    Result Date: 3/14/2022  Vascular Lower Extremities DVT Study Procedure  Demographics   Patient Name     Christian Coupe Age                    79   Patient Number [id number]         Gender                 Female   Visit Number     [id number]      Interpreting Physician Jie Ferrell   Date of Birth    1940     Referring Physician    Tripp Diaz   Accession Number [id number]     NATIVIDAD/ Jonathan Murillo [address] RVT  Procedure Type of Study:   Veins:Lower Extremities DVT Study, VL LOWER EXTREMITY BILATERAL VENOUS  DUPLEX . Indications for Study:Edema, bilateral lower extremity. Impression   Normal venous duplex study of the bilateral lower extremity(ies). There is  no evidence of deep or superficial venous thrombosis. Study limited due to patient immobility. Signature   ----------------------------------------------------------------  Electronically signed by Ivy Cormier(Interpreting  physician) on 03/14/2022 04:47 PM  ----------------------------------------------------------------  Velocities are measured in cm/s ; Diameters are measured in mm Right Lower Extremities DVT Study Measurements Right 2D Measurements +------------------------------------+----------+---------------+----------+ ! Location                            ! Visualized! Compressibility! Thrombosis! +------------------------------------+----------+---------------+----------+ ! Sapheno Femoral Junction            ! Yes       ! Yes            ! None      ! +------------------------------------+----------+---------------+----------+ ! Common Femoral                      !Yes       ! Yes            ! None      ! +------------------------------------+----------+---------------+----------+ ! Prox Femoral                        !Yes       ! Yes            ! None      ! +------------------------------------+----------+---------------+----------+ ! Mid Femoral                         !Yes       ! Yes            ! None      ! +------------------------------------+----------+---------------+----------+ ! Dist Femoral                        !Yes       ! Yes            ! None      ! +------------------------------------+----------+---------------+----------+ ! Deep Femoral                        !Yes       ! Yes            ! None      ! +------------------------------------+----------+---------------+----------+ ! Popliteal                           !Partial   !Yes            ! None      ! +------------------------------------+----------+---------------+----------+ ! SSV                                 ! Partial   !Yes            ! None      ! +------------------------------------+----------+---------------+----------+ ! Gastroc                             ! No        !               !          ! +------------------------------------+----------+---------------+----------+ ! PTV                                 ! Yes       ! Yes            ! None      ! +------------------------------------+----------+---------------+----------+ ! GSV                                 ! Partial   !Yes            ! None      ! +------------------------------------+----------+---------------+----------+ ! ATV                                 ! Yes       ! Yes            ! None      ! +------------------------------------+----------+---------------+----------+ ! Peroneal                            !Partial   !Yes            ! None      ! +------------------------------------+----------+---------------+----------+ Right Doppler Measurements +---------------------+------+------+--------------------------------------+ ! Location             ! Signal!Reflux! Reflux (msec)                         ! +---------------------+------+------+--------------------------------------+ ! Mid Femoral          !      ! Yes   ! 1416                                  ! +---------------------+------+------+--------------------------------------+ Left Lower Extremities DVT Study Measurements Left 2D Measurements +------------------------------------+----------+---------------+----------+ ! Location                            ! Visualized! Compressibility! Thrombosis! +------------------------------------+----------+---------------+----------+ ! Sapheno Femoral Junction            ! Yes       ! Yes            ! None      ! +------------------------------------+----------+---------------+----------+ ! Common Femoral                      !Yes       ! Yes            ! None      ! +------------------------------------+----------+---------------+----------+ ! Prox Femoral                        !Yes       ! Yes            ! None      ! +------------------------------------+----------+---------------+----------+ ! Mid Femoral                         !Yes       ! Yes            ! None      ! +------------------------------------+----------+---------------+----------+ ! Dist Femoral                        !Yes       ! Yes            ! None      ! +------------------------------------+----------+---------------+----------+ ! Deep Femoral                        !Yes       ! Yes            ! None      ! +------------------------------------+----------+---------------+----------+ ! Popliteal                           !Partial   !Yes            ! None      ! +------------------------------------+----------+---------------+----------+ ! SSV                                 ! Partial   !Yes            ! None      ! +------------------------------------+----------+---------------+----------+ ! Gastroc                             ! No        !               !          ! +------------------------------------+----------+---------------+----------+ ! PTV                                 ! Yes       ! Yes            ! None      ! +------------------------------------+----------+---------------+----------+ ! GSV                                 ! Partial   !Yes            ! None      ! +------------------------------------+----------+---------------+----------+ ! ATV                                 ! Yes       ! Yes            ! None      ! +------------------------------------+----------+---------------+----------+ ! Ana !Partial   !Yes            ! None      ! +------------------------------------+----------+---------------+----------+    ECHO 2D WO COLOR DOPPLER COMPLETE    Result Date: 3/14/2022  Transthoracic Echocardiography Report (TTE)  Demographics   Patient Name  Reilly Watson Date of Study         03/14/2022   MRN           [id number]         Gender                Female   Date of Birth 1940     Room Number           [facility]   Age           79 year(s)   Height:       63 inches      Referring Physician   Amie Rodriguez MD   Weight:       170 pounds     Sonographer           Francie Lino [facility]   BSA:          1.8 m^2        Interpreting          Chuck Woods MD                               Physician   BMI:          30.11 kg/m^2  Procedure Type of Study   TTE procedure:ECHO 2D W/DOPPLER/CONTRAST LIMITD. Study Location: Echo Lab Technical Quality: Adequate visualization Patient Status: Inpatient Contrast Medium: Definity. Amount - 4 ml HR: 73 bpm BP: 123/67 mmHg Indications:Congestive heart failure. Conclusions   Summary  Limited echocardiographic study. LV is severely dilated with severely reduced LV systolic function. LV  ejection fraction estimated at 15 to 20%. No thrombus noted on contrast study. RV appears normal in size with normal systolic function. Moderate left atrial enlargement. Normal right atrial size. Aortic valve is mild to moderately thickened and calcified with preserved  leaflet mobility. Trileaflet. Mild aortic regurgitation. No stenosis. Mitral valve is mildly thickened with preserved leaflet mobility. Mild  mitral calcification. Moderate to severe mitral regurgitation (2-3+). No  stenosis. No significant pericardial effusion.    Signature   ----------------------------------------------------------------  Electronically signed by Chuck Woods MD(Interpreting physician)  on 03/14/2022 06:23 PM  ----------------------------------------------------------------  M-Mode Measurements (cm)   LVIDd: 6.02 cm                                  LVIDs: 5.47 cm  IVSd: 0.96 cm  LVPWd: 0.94 cm  % Ejection Fraction: 17 %                       LA: 4.5 cm                                                  LVOT: 2.2 cm  Doppler Measurements:   AV Peak Velocity:143 cm/s          MV Peak E-Wave: 101 cm/s  AV Peak Gradient: 8.18 mmHg        MV Peak A-Wave: 50.9 cm/s                                     MV E/A Ratio: 1.98 %  TR Velocity:288 cm/s               MV Peak Gradient: 4.08 mmHg  TR Gradient:33.18 mmHg  Estimated RAP:15 mmHg  RVSP:48 mmHg        Pertinent Labs:   CBC:   Recent Labs     03/23/22 0315 03/24/22  0152 03/25/22  0255   WBC 6.3 7.8 6.9   HGB 9.3* 9.7* 9.7*    174 174     BMP:    Recent Labs     03/23/22 0315 03/24/22  0152 03/25/22  0255    144 142   K 4.7 4.9 4.3    107 106   CO2 24 24 24   BUN 43* 48* 60*   CREATININE 1.6* 1.5* 1.5*   GLUCOSE 135* 129* 109           Hospital Course:  Patient was admitted on 3/13 with worsening SOB and leg swelling. She was recently diagnosed with systolic CHF and was started on entresto on her previous admission with plans to repeat a TTE at the end of march for possible ICD placement. Admitted with acute systolic heart failure in setting of markedly elevated proBNP and JENNA suspected related to cardiorenal syndrome. She received diuresis with IV lasix and aldactone on this admission with significant net negative fluid balance, improvement. Evaluated by cardiology with recommendation to follow-up outpatient to arrange ICD placement. Repeat TTE inpatient revealed EF 25-30%. Patient subsequently transitioned to oral Lasix and will continue on Entresto, Aldactone, and Coreg. Hospital course complicated after patient developed worsening mental status with confusion, then on 3/16 patient fell out of bed with resultant right elbow and head trauma. No evidence of fracture on plain films of elbow.   CT head with no intracranial bleeding or other abnormality aside from mild scalp soft tissue swelling in right frontal and parietal region. No evidence of skull fracture. Given head trauma, Xarelto and aspirin were held. Noted to have inappropriate elevated TSH, so increased Synthroid. Obtained MRI brain on 3/17 which revealed chronic ischemic changes with atrophy but no acute findings otherwise. Due to encephalopathy, repeat UA which showed active urine sediment consistent with UTI. Urine culture speciated ESBL E. coli and Enterococcus, with susceptibilities reviewed, antibiotics switched to meropenem and ampicillin. Patient was noted to have some occasional myoclonic movement, given concern for acute change, repeated CT head which showed no interval change. EEG obtained and neurology consulted. Noted to have worsening hypercalcemia during hospital course with work-up initiated including PTH. Treated hypercalcemia with calcitonin with some improvement. She remained encephalopathic over several days, however completed antibiotic course on meropenem and ampicillin to treat ESBL E. coli and Enterococcus respectively. Mentation subsequently improved and set up for LifeVest per cardiology. Patient stable for discharge to nursing/rehab facility, will resume Xarelto for chronic A. fib stroke prophylaxis (was held for approximately 1 week without any signs of worsening bleed, superficial hematoma stable). Will discontinue aspirin to reduce bleed risk given her fall with superficial hematoma. Levothyroxine dose has been increased due to uncontrolled hypothyroidism and recommend repeat TSH in 6 weeks.   Continue on heart failure regimen with Lasix, Aldactone, Entresto, Coreg and follow-up outpatient with cardiology arranged.           Physical Exam:  Vital Signs: /79   Pulse 75   Temp 96.3 °F (35.7 °C) (Temporal)   Resp 16   Ht 5' 3\" (1.6 m)   Wt 165 lb 9.6 oz (75.1 kg)   SpO2 92%   BMI 29.33 kg/m²   General: Elderly D 25 MCG (1000 UT) Caps        STOP taking these medications    amLODIPine 5 MG tablet  Commonly known as: NORVASC     aspirin 81 MG EC tablet           Where to Get Your Medications      Information about where to get these medications is not yet available    Ask your nurse or doctor about these medications  · furosemide 20 MG tablet  · levothyroxine 112 MCG tablet  · rivaroxaban 15 MG Tabs tablet         Discharge Instructions: Follow up with Kasi Jarvis within 1 week. Take medications as directed. Resume activity as tolerated. Diet: ADULT DIET; Dysphagia - Pureed; Moderately Thick (Honey)   Disposition: Patient is medically stable and will be discharged to Nursing/Rehab Facility. Time spent on discharge 35 minutes.      Signed:  Amando Roach MD  3/25/2022 9:42 AM

## 2022-03-25 NOTE — PROGRESS NOTES
accumulation      Nutrition Interventions:   Food and/or Nutrient Delivery:  Continue Current Diet,Start Oral Nutrition Supplement  Nutrition Education/Counseling:  Education completed   Coordination of Nutrition Care:  Continue to monitor while inpatient    Goals:  Po intake >75%, reduced/improved edema       Nutrition Monitoring and Evaluation:   Behavioral-Environmental Outcomes:  None Identified   Food/Nutrient Intake Outcomes:  Diet Advancement/Tolerance,Food and Nutrient Intake  Physical Signs/Symptoms Outcomes:  Biochemical Data,Chewing or Swallowing,Fluid Status or Edema,Weight,Skin     Discharge Planning:    Continue current diet     Electronically signed by Chema Villarreal MS, RD, LD on 3/25/22 at 12:41 PM CDT    Contact: [phone]

## 2022-03-30 LAB — PTH RELATED PEPTIDE: 3.4 PMOL/L (ref 0–3.4)

## 2022-04-01 ENCOUNTER — TELEPHONE (OUTPATIENT)
Dept: CARDIOLOGY CLINIC | Age: 82
End: 2022-04-01

## 2022-04-01 DIAGNOSIS — I42.0 DILATED CARDIOMYOPATHY (HCC): Primary | ICD-10-CM

## 2022-04-01 NOTE — TELEPHONE ENCOUNTER
Called and spoke with patient's daughter, advised that it was time to have patient's echo repeated to see if she can remove her lifevest or proceed with ICD placement. She advised that patient never left the hospital with a lifevest because she was not well enough to understand the directions. Patient is currently in [facility] and [address] [place] [address] and is currently not doing well. Family is agreeable to proceed with echo to check EF and possibly proceed with ICD placement if we can get an echo completed. She advised she did not know if patient was well enough to travel and where we could do the echo at. I advised I will call and speak with her nurse at the facility and see what is the best option for patient at this time and let her know.

## 2022-04-04 NOTE — TELEPHONE ENCOUNTER
Called to speak to nurse taking care of patient at [facility], was put on hold and then transferred to a NP cell phone which went straight to v/m. Left message asking for a return call to discuss if patient could have an echo at this time.

## 2022-04-04 NOTE — TELEPHONE ENCOUNTER
Called and spoke with nurse at [facility] to ask about patient's status regarding going to outside facility for echo. She advised that she did not feel like patient was stable enough to be able to transport at this time. She advised they were doing more lab work and would hopefully know more after that but did not feel like patient was ready to leave facility for any testing. Advised we would check back in a couple weeks to a month and see if patient's status has changed.

## 2022-04-21 NOTE — TELEPHONE ENCOUNTER
[facility] called in to see if we can get an order for ECHO faxed to [facility] ([facility]) so patient is able to get it done closer to facility.   Please advise  Patient is currently scheduled for an echo at [facility] 04.28.22

## 2022-04-25 ENCOUNTER — TELEPHONE (OUTPATIENT)
Dept: CARDIOLOGY CLINIC | Age: 82
End: 2022-04-25

## 2022-04-25 NOTE — TELEPHONE ENCOUNTER
Dr. Tiffanie Patel wants to know what makes her not stable enough also said it is up to the family and patient. To call the nursing home and see what nurse says about condition. Tried to call nursing home but was on hold for 10 minutes. Will try again tomorrow.

## 2022-04-25 NOTE — TELEPHONE ENCOUNTER
Francesca Berkowitz from [facility] [address] and rehab called to have an order sent to [facility] @ [phone] for pt to have Echo done there. Please return call to advise.

## 2022-04-26 NOTE — TELEPHONE ENCOUNTER
Talked to daughter because I couldn't get nursing home to answer. Daughter feels her mother is not in good enough condition or state of mind to be leaving the nursing home currently. Says she is bed bound and not able to stand or anything on her own. She wishes to put ECHO and taya off until the end of May.